# Patient Record
Sex: MALE | Race: WHITE | Employment: OTHER | URBAN - NONMETROPOLITAN AREA
[De-identification: names, ages, dates, MRNs, and addresses within clinical notes are randomized per-mention and may not be internally consistent; named-entity substitution may affect disease eponyms.]

---

## 2017-04-27 ENCOUNTER — TELEPHONE (OUTPATIENT)
Dept: FAMILY MEDICINE CLINIC | Age: 57
End: 2017-04-27

## 2017-08-07 ENCOUNTER — TELEPHONE (OUTPATIENT)
Dept: FAMILY MEDICINE CLINIC | Age: 57
End: 2017-08-07

## 2017-10-09 ENCOUNTER — OFFICE VISIT (OUTPATIENT)
Dept: FAMILY MEDICINE CLINIC | Age: 57
End: 2017-10-09

## 2017-10-09 VITALS
SYSTOLIC BLOOD PRESSURE: 124 MMHG | OXYGEN SATURATION: 97 % | DIASTOLIC BLOOD PRESSURE: 74 MMHG | HEART RATE: 77 BPM | HEIGHT: 68 IN | WEIGHT: 214.2 LBS | BODY MASS INDEX: 32.46 KG/M2

## 2017-10-09 DIAGNOSIS — Z12.5 PROSTATE CANCER SCREENING: ICD-10-CM

## 2017-10-09 DIAGNOSIS — Z95.3 H/O HEART VALVE REPLACEMENT WITH BIOPROSTHETIC VALVE: ICD-10-CM

## 2017-10-09 DIAGNOSIS — I25.83 CORONARY ARTERY DISEASE DUE TO LIPID RICH PLAQUE: Primary | ICD-10-CM

## 2017-10-09 DIAGNOSIS — I48.0 PAROXYSMAL ATRIAL FIBRILLATION (HCC): ICD-10-CM

## 2017-10-09 DIAGNOSIS — E78.00 PURE HYPERCHOLESTEROLEMIA: ICD-10-CM

## 2017-10-09 DIAGNOSIS — Z23 NEED FOR INFLUENZA VACCINATION: ICD-10-CM

## 2017-10-09 DIAGNOSIS — Z79.899 ENCOUNTER FOR LONG-TERM (CURRENT) USE OF MEDICATIONS: ICD-10-CM

## 2017-10-09 DIAGNOSIS — I10 BENIGN ESSENTIAL HTN: ICD-10-CM

## 2017-10-09 DIAGNOSIS — M54.17 LUMBOSACRAL RADICULOPATHY AT L5: ICD-10-CM

## 2017-10-09 DIAGNOSIS — I25.10 CORONARY ARTERY DISEASE DUE TO LIPID RICH PLAQUE: Primary | ICD-10-CM

## 2017-10-09 PROCEDURE — 90688 IIV4 VACCINE SPLT 0.5 ML IM: CPT | Performed by: FAMILY MEDICINE

## 2017-10-09 PROCEDURE — 90471 IMMUNIZATION ADMIN: CPT | Performed by: FAMILY MEDICINE

## 2017-10-09 PROCEDURE — 99214 OFFICE O/P EST MOD 30 MIN: CPT | Performed by: FAMILY MEDICINE

## 2017-10-09 NOTE — PROGRESS NOTES
kg).  OBJECTIVE:    /74 (Site: Left Arm, Position: Sitting, Cuff Size: Large Adult)   Pulse 77   Ht 5' 8\" (1.727 m)   Wt 214 lb 3.2 oz (97.2 kg)   SpO2 97%   BMI 32.57 kg/m²   BP Readings from Last 2 Encounters:   10/09/17 124/74   12/05/16 116/71     Lab Review   No visits with results within 6 Month(s) from this visit. Latest known visit with results is:   Nurse Only on 11/01/2016   Component Date Value    Sodium 11/01/2016 140     Potassium 11/01/2016 4.2     Chloride 11/01/2016 103     CO2 11/01/2016 24     Anion Gap 11/01/2016 13     Glucose 11/01/2016 97     BUN 11/01/2016 20     CREATININE 11/01/2016 0.7*    GFR Non- 11/01/2016 >60     GFR  11/01/2016 >60     Calcium 11/01/2016 9.4     Total Protein 11/01/2016 6.6     Alb 11/01/2016 4.4     Albumin/Globulin Ratio 11/01/2016 2.0     Total Bilirubin 11/01/2016 0.9     Alkaline Phosphatase 11/01/2016 62     ALT 11/01/2016 35     AST 11/01/2016 25     Globulin 11/01/2016 2.2     Cholesterol, Total 11/01/2016 143     Triglycerides 11/01/2016 152*    HDL 11/01/2016 46     LDL Calculated 11/01/2016 67     VLDL CHOLESTEROL CALCULA* 11/01/2016 30     WBC 11/01/2016 4.8     RBC 11/01/2016 5.21     Hemoglobin 11/01/2016 15.5     Hematocrit 11/01/2016 47.1     MCV 11/01/2016 90.4     MCH 11/01/2016 29.8     MCHC 11/01/2016 33.0     RDW 11/01/2016 13.6     Platelets 53/62/4556 152     MPV 11/01/2016 8.4     Neutrophils % 11/01/2016 65.0     Lymphocytes % 11/01/2016 23.0     Monocytes % 11/01/2016 7.0     Eosinophils % 11/01/2016 4.0     Basophils % 11/01/2016 1.0     Neutrophils # 11/01/2016 3.1     Lymphocytes # 11/01/2016 1.1     Monocytes # 11/01/2016 0.3     Eosinophils # 11/01/2016 0.2     Basophils # 11/01/2016 0.0     TSH 11/01/2016 3.62     T4 Free 11/01/2016 1.1     PSA 11/01/2016 1.28        Physical Exam   Constitutional: He appears well-developed and well-nourished.  No so he is aware. Today he is in sinus rhythm. Prosthetic bowel sounds good. Blood pressure and lipids acceptable. He's had no difficulty were getting radiculopathy, he does not have any complaints today of same. His knee crepitance is same. Now worse a carpal tunnel splint. His shoulder is about the same. Follow-up in a year, return for labs. Health maintenance accepted. Patient should call the office immediately with new or ongoing signs or symptoms or worsening, or proceed to the emergency room. All entries in chief complaint and history of present illness are reviewed and validated by me. No changes in past medical history, past surgical history, social history, or family history were noted during the patient encounter unless specifically listed above. All updates of past medical history, past surgical history, social history, or family history were reviewed personally by me during the office visit. All problems listed in the assessment are stable unless noted otherwise. Medication profile reviewed personally by me during the office visit. Medication side effects and possible impairments from medications were discussed as applicable. Every effort has been made to assure accurate transcription by this voice recognition software. However, mistakes in transcription may still occur      I, Susan Lennox am scribing for and in the presence of Dr Jaleel Noguera. 10/9/2017      3:07 PM      I, Dr. Manjinder Chino, personally performed the services described in this documentation, as scribed by Susan Lennox, RN, in my presence, and it is both accurate and complete. I agree with the Chief Complaint, ROS, and Past Histories independently gathered by the clinical support staff and the remaining scribed note accurately describes my personal service to the patient.     10/9/2017    3:06 PM

## 2017-11-20 RX ORDER — ROSUVASTATIN CALCIUM 40 MG/1
TABLET, COATED ORAL
Qty: 30 TABLET | Refills: 11 | Status: SHIPPED | OUTPATIENT
Start: 2017-11-20 | End: 2018-11-23 | Stop reason: SDUPTHER

## 2017-12-22 ENCOUNTER — NURSE ONLY (OUTPATIENT)
Dept: FAMILY MEDICINE CLINIC | Age: 57
End: 2017-12-22

## 2017-12-22 DIAGNOSIS — E78.00 PURE HYPERCHOLESTEROLEMIA: ICD-10-CM

## 2017-12-22 DIAGNOSIS — Z12.5 PROSTATE CANCER SCREENING: ICD-10-CM

## 2017-12-22 DIAGNOSIS — I10 BENIGN ESSENTIAL HTN: ICD-10-CM

## 2017-12-22 DIAGNOSIS — Z79.899 ENCOUNTER FOR LONG-TERM (CURRENT) USE OF MEDICATIONS: ICD-10-CM

## 2017-12-22 LAB
A/G RATIO: 2.4 (ref 1.1–2.2)
ALBUMIN SERPL-MCNC: 4.7 G/DL (ref 3.4–5)
ALP BLD-CCNC: 62 U/L (ref 40–129)
ALT SERPL-CCNC: 28 U/L (ref 10–40)
ANION GAP SERPL CALCULATED.3IONS-SCNC: 13 MMOL/L (ref 3–16)
AST SERPL-CCNC: 22 U/L (ref 15–37)
BASOPHILS ABSOLUTE: 0 K/UL (ref 0–0.2)
BASOPHILS RELATIVE PERCENT: 0.6 %
BILIRUB SERPL-MCNC: 1 MG/DL (ref 0–1)
BUN BLDV-MCNC: 20 MG/DL (ref 7–20)
CALCIUM SERPL-MCNC: 9.6 MG/DL (ref 8.3–10.6)
CHLORIDE BLD-SCNC: 103 MMOL/L (ref 99–110)
CHOLESTEROL, TOTAL: 132 MG/DL (ref 0–199)
CO2: 26 MMOL/L (ref 21–32)
CREAT SERPL-MCNC: 0.7 MG/DL (ref 0.9–1.3)
EOSINOPHILS ABSOLUTE: 0.3 K/UL (ref 0–0.6)
EOSINOPHILS RELATIVE PERCENT: 5.8 %
GFR AFRICAN AMERICAN: >60
GFR NON-AFRICAN AMERICAN: >60
GLOBULIN: 2 G/DL
GLUCOSE BLD-MCNC: 90 MG/DL (ref 70–99)
HCT VFR BLD CALC: 45.9 % (ref 40.5–52.5)
HDLC SERPL-MCNC: 46 MG/DL (ref 40–60)
HEMOGLOBIN: 15.5 G/DL (ref 13.5–17.5)
LDL CHOLESTEROL CALCULATED: 68 MG/DL
LYMPHOCYTES ABSOLUTE: 1.3 K/UL (ref 1–5.1)
LYMPHOCYTES RELATIVE PERCENT: 27.4 %
MCH RBC QN AUTO: 30.5 PG (ref 26–34)
MCHC RBC AUTO-ENTMCNC: 33.8 G/DL (ref 31–36)
MCV RBC AUTO: 90.2 FL (ref 80–100)
MONOCYTES ABSOLUTE: 0.4 K/UL (ref 0–1.3)
MONOCYTES RELATIVE PERCENT: 8.9 %
NEUTROPHILS ABSOLUTE: 2.7 K/UL (ref 1.7–7.7)
NEUTROPHILS RELATIVE PERCENT: 57.3 %
PDW BLD-RTO: 13.2 % (ref 12.4–15.4)
PLATELET # BLD: 151 K/UL (ref 135–450)
PMV BLD AUTO: 9.1 FL (ref 5–10.5)
POTASSIUM SERPL-SCNC: 4.3 MMOL/L (ref 3.5–5.1)
PROSTATE SPECIFIC ANTIGEN: 1.42 NG/ML (ref 0–4)
RBC # BLD: 5.09 M/UL (ref 4.2–5.9)
SODIUM BLD-SCNC: 142 MMOL/L (ref 136–145)
TOTAL PROTEIN: 6.7 G/DL (ref 6.4–8.2)
TRIGL SERPL-MCNC: 91 MG/DL (ref 0–150)
VLDLC SERPL CALC-MCNC: 18 MG/DL
WBC # BLD: 4.7 K/UL (ref 4–11)

## 2017-12-22 PROCEDURE — 36415 COLL VENOUS BLD VENIPUNCTURE: CPT | Performed by: FAMILY MEDICINE

## 2018-07-20 ENCOUNTER — CLINICAL DOCUMENTATION (OUTPATIENT)
Dept: FAMILY MEDICINE CLINIC | Age: 58
End: 2018-07-20

## 2018-10-08 ENCOUNTER — TELEPHONE (OUTPATIENT)
Dept: FAMILY MEDICINE CLINIC | Age: 58
End: 2018-10-08

## 2018-10-10 ENCOUNTER — TELEPHONE (OUTPATIENT)
Dept: FAMILY MEDICINE CLINIC | Age: 58
End: 2018-10-10

## 2018-10-17 ENCOUNTER — OFFICE VISIT (OUTPATIENT)
Dept: FAMILY MEDICINE CLINIC | Age: 58
End: 2018-10-17
Payer: COMMERCIAL

## 2018-10-17 VITALS
TEMPERATURE: 98.5 F | DIASTOLIC BLOOD PRESSURE: 68 MMHG | OXYGEN SATURATION: 96 % | HEART RATE: 76 BPM | SYSTOLIC BLOOD PRESSURE: 124 MMHG | BODY MASS INDEX: 28.52 KG/M2 | HEIGHT: 68 IN | WEIGHT: 188.2 LBS

## 2018-10-17 DIAGNOSIS — M72.2 PLANTAR FASCIITIS OF LEFT FOOT: ICD-10-CM

## 2018-10-17 DIAGNOSIS — R23.9 ALTERATION IN SKIN INTEGRITY: ICD-10-CM

## 2018-10-17 DIAGNOSIS — R63.4 WEIGHT LOSS: ICD-10-CM

## 2018-10-17 DIAGNOSIS — I88.9 LYMPHADENITIS: Primary | ICD-10-CM

## 2018-10-17 DIAGNOSIS — I89.1 LYMPHANGITIS: ICD-10-CM

## 2018-10-17 PROCEDURE — 1111F DSCHRG MED/CURRENT MED MERGE: CPT | Performed by: FAMILY MEDICINE

## 2018-10-17 PROCEDURE — 99214 OFFICE O/P EST MOD 30 MIN: CPT | Performed by: FAMILY MEDICINE

## 2018-10-17 RX ORDER — LOSARTAN POTASSIUM 25 MG/1
25 TABLET ORAL DAILY
Qty: 30 TABLET | Refills: 11
Start: 2018-10-17 | End: 2021-03-23

## 2018-10-17 RX ORDER — METOPROLOL SUCCINATE 25 MG/1
12.5 TABLET, EXTENDED RELEASE ORAL 2 TIMES DAILY
Qty: 30 TABLET | Refills: 11
Start: 2018-10-17 | End: 2021-04-23

## 2018-11-05 ENCOUNTER — TELEPHONE (OUTPATIENT)
Dept: FAMILY MEDICINE CLINIC | Age: 58
End: 2018-11-05

## 2018-11-05 DIAGNOSIS — E78.00 HYPERCHOLESTEROLEMIA: Primary | ICD-10-CM

## 2018-11-05 NOTE — TELEPHONE ENCOUNTER
Dr. Janice Lainez:    Notes: Patient will be in Nov 9 for labs    This patient has an upcoming appointment with you for a Routine Physical.     In planning for that visit I have completed the following pre-visit planning:     Pre-Visit Planning Checklist:  Patient contacted: yes  Verified patient by name and date of birth: yes    Health Maintenance items reviewed:    No pre-visit planning health maintenance topics to review at this time    Labs and procedures pended: Fasting Lipid Panel  and CMP   Labs and procedures discussed with patient: yes  Reminded patient to check with their insurance company about coverage for lab tests and lab location: no    Preliminary Medication Reconciliation: was performed. Reminded patient to arrive early: yes    Please complete the med-reconciliation and sign the appropriate labs as soon as possible.       Lora Watts MA  Pre-Services Specialist

## 2018-11-09 ENCOUNTER — NURSE ONLY (OUTPATIENT)
Dept: FAMILY MEDICINE CLINIC | Age: 58
End: 2018-11-09
Payer: COMMERCIAL

## 2018-11-09 DIAGNOSIS — E78.00 HYPERCHOLESTEROLEMIA: ICD-10-CM

## 2018-11-09 DIAGNOSIS — E78.00 PURE HYPERCHOLESTEROLEMIA: Primary | ICD-10-CM

## 2018-11-09 PROCEDURE — 36415 COLL VENOUS BLD VENIPUNCTURE: CPT | Performed by: FAMILY MEDICINE

## 2018-11-10 LAB
A/G RATIO: 1.9 (ref 1.1–2.2)
ALBUMIN SERPL-MCNC: 4.5 G/DL (ref 3.4–5)
ALP BLD-CCNC: 64 U/L (ref 40–129)
ALT SERPL-CCNC: 21 U/L (ref 10–40)
ANION GAP SERPL CALCULATED.3IONS-SCNC: 13 MMOL/L (ref 3–16)
AST SERPL-CCNC: 19 U/L (ref 15–37)
BILIRUB SERPL-MCNC: 0.9 MG/DL (ref 0–1)
BUN BLDV-MCNC: 18 MG/DL (ref 7–20)
CALCIUM SERPL-MCNC: 9.7 MG/DL (ref 8.3–10.6)
CHLORIDE BLD-SCNC: 104 MMOL/L (ref 99–110)
CHOLESTEROL, TOTAL: 138 MG/DL (ref 0–199)
CO2: 25 MMOL/L (ref 21–32)
CREAT SERPL-MCNC: 0.8 MG/DL (ref 0.9–1.3)
GFR AFRICAN AMERICAN: >60
GFR NON-AFRICAN AMERICAN: >60
GLOBULIN: 2.4 G/DL
GLUCOSE BLD-MCNC: 81 MG/DL (ref 70–99)
HDLC SERPL-MCNC: 51 MG/DL (ref 40–60)
LDL CHOLESTEROL CALCULATED: 72 MG/DL
POTASSIUM SERPL-SCNC: 4.3 MMOL/L (ref 3.5–5.1)
SODIUM BLD-SCNC: 142 MMOL/L (ref 136–145)
TOTAL PROTEIN: 6.9 G/DL (ref 6.4–8.2)
TRIGL SERPL-MCNC: 77 MG/DL (ref 0–150)
TSH SERPL DL<=0.05 MIU/L-ACNC: 1.43 UIU/ML (ref 0.27–4.2)
VLDLC SERPL CALC-MCNC: 15 MG/DL

## 2018-11-26 RX ORDER — ROSUVASTATIN CALCIUM 40 MG/1
TABLET, COATED ORAL
Qty: 30 TABLET | Refills: 1 | Status: SHIPPED | OUTPATIENT
Start: 2018-11-26

## 2019-01-11 ENCOUNTER — OFFICE VISIT (OUTPATIENT)
Dept: FAMILY MEDICINE CLINIC | Age: 59
End: 2019-01-11
Payer: COMMERCIAL

## 2019-01-11 VITALS
SYSTOLIC BLOOD PRESSURE: 118 MMHG | OXYGEN SATURATION: 98 % | BODY MASS INDEX: 29.56 KG/M2 | HEART RATE: 64 BPM | DIASTOLIC BLOOD PRESSURE: 76 MMHG | WEIGHT: 194.4 LBS

## 2019-01-11 DIAGNOSIS — I10 BENIGN ESSENTIAL HTN: ICD-10-CM

## 2019-01-11 DIAGNOSIS — I25.83 CORONARY ARTERY DISEASE DUE TO LIPID RICH PLAQUE: ICD-10-CM

## 2019-01-11 DIAGNOSIS — R53.1 SPELL OF GENERALIZED WEAKNESS: Primary | ICD-10-CM

## 2019-01-11 DIAGNOSIS — I25.10 CORONARY ARTERY DISEASE DUE TO LIPID RICH PLAQUE: ICD-10-CM

## 2019-01-11 DIAGNOSIS — Z23 NEED FOR SHINGLES VACCINE: ICD-10-CM

## 2019-01-11 DIAGNOSIS — E78.00 PURE HYPERCHOLESTEROLEMIA: ICD-10-CM

## 2019-01-11 DIAGNOSIS — Z12.5 PROSTATE CANCER SCREENING: ICD-10-CM

## 2019-01-11 DIAGNOSIS — Z95.3 H/O HEART VALVE REPLACEMENT WITH BIOPROSTHETIC VALVE: ICD-10-CM

## 2019-01-11 DIAGNOSIS — I48.0 PAROXYSMAL ATRIAL FIBRILLATION (HCC): ICD-10-CM

## 2019-01-11 PROCEDURE — 99214 OFFICE O/P EST MOD 30 MIN: CPT | Performed by: FAMILY MEDICINE

## 2019-04-30 ENCOUNTER — OFFICE VISIT (OUTPATIENT)
Dept: FAMILY MEDICINE CLINIC | Age: 59
End: 2019-04-30
Payer: COMMERCIAL

## 2019-04-30 VITALS
DIASTOLIC BLOOD PRESSURE: 80 MMHG | HEART RATE: 68 BPM | BODY MASS INDEX: 29.86 KG/M2 | OXYGEN SATURATION: 98 % | SYSTOLIC BLOOD PRESSURE: 112 MMHG | WEIGHT: 196.4 LBS

## 2019-04-30 DIAGNOSIS — S70.12XA HEMATOMA OF LEFT THIGH, INITIAL ENCOUNTER: Primary | ICD-10-CM

## 2019-04-30 DIAGNOSIS — Z79.01 ANTICOAGULATED: ICD-10-CM

## 2019-04-30 PROCEDURE — 99213 OFFICE O/P EST LOW 20 MIN: CPT | Performed by: FAMILY MEDICINE

## 2019-04-30 ASSESSMENT — PATIENT HEALTH QUESTIONNAIRE - PHQ9
SUM OF ALL RESPONSES TO PHQ9 QUESTIONS 1 & 2: 0
1. LITTLE INTEREST OR PLEASURE IN DOING THINGS: 0
SUM OF ALL RESPONSES TO PHQ QUESTIONS 1-9: 0
2. FEELING DOWN, DEPRESSED OR HOPELESS: 0
SUM OF ALL RESPONSES TO PHQ QUESTIONS 1-9: 0

## 2019-04-30 NOTE — PROGRESS NOTES
Alcohol use: No     Alcohol/week: 0.0 oz    Drug use: No    Sexual activity: Yes     Partners: Female   Lifestyle    Physical activity:     Days per week: Not on file     Minutes per session: Not on file    Stress: Not on file   Relationships    Social connections:     Talks on phone: Not on file     Gets together: Not on file     Attends [employer] service: Not on file     Active member of club or organization: Not on file     Attends meetings of clubs or organizations: Not on file     Relationship status: Not on file    Intimate partner violence:     Fear of current or ex partner: Not on file     Emotionally abused: Not on file     Physically abused: Not on file     Forced sexual activity: Not on file   Other Topics Concern    Not on file   Social History Narrative    Not on file       Prior to Visit Medications    Medication Sig Taking? Authorizing Provider   apixaban (ELIQUIS) 5 MG TABS tablet Take 5 mg by mouth 2 times daily Yes Historical Provider, MD   zoster recombinant adjuvanted vaccine (SHINGRIX) 50 MCG/0.5ML SUSR injection 1 now and repeat in 2-6 months. Yes Cody Miller MD   rosuvastatin (CRESTOR) 40 MG tablet TAKE 1 TABLET BY MOUTH DAILY Yes Cody Miller MD   losartan (COZAAR) 25 MG tablet Take 1 tablet by mouth daily Yes Cody Miller MD   metoprolol succinate (TOPROL XL) 25 MG extended release tablet Take 0.5 tablets by mouth 2 times daily Yes Cody Miller MD   clopidogrel (PLAVIX) 75 MG tablet Take 75 mg by mouth daily. Yes Historical Provider, MD   ibuprofen (ADVIL;MOTRIN) 200 MG tablet Take 200 mg by mouth 3 times daily. Yes Historical Provider, MD   pantoprazole (PROTONIX) 40 MG tablet Take 40 mg by mouth daily. Yes Historical Provider, MD   fish oil-omega-3 fatty acids 1000 MG capsule Take 2 g by mouth three times daily.    Yes Historical Provider, MD     Allergies   Allergen Reactions    Miralax [Polyethylene Glycol] Other (See Comments)     Seizure after consuming whole bottle for colonoscopy per patient    Codeine Nausea And Vomiting       OBJECTIVE:  Estimated body mass index is 29.86 kg/m² as calculated from the following:    Height as of 10/17/18: 5' 8\" (1.727 m). Weight as of this encounter: 196 lb 6.4 oz (89.1 kg). Vitals:    04/30/19 1307   BP: 112/80   Site: Left Upper Arm   Position: Sitting   Cuff Size: Medium Adult   Pulse: 68   SpO2: 98%   Weight: 196 lb 6.4 oz (89.1 kg)     BP Readings from Last 2 Encounters:   04/30/19 112/80   01/11/19 118/76     Wt Readings from Last 3 Encounters:   04/30/19 196 lb 6.4 oz (89.1 kg)   01/11/19 194 lb 6.4 oz (88.2 kg)   10/17/18 188 lb 3.2 oz (85.4 kg)       Physical Exam   Constitutional: He is oriented to person, place, and time. He appears well-developed and well-nourished. HENT:   Head: Normocephalic and atraumatic. Cardiovascular: Normal rate. Pulmonary/Chest: Effort normal.   Musculoskeletal:        Legs:  Neurological: He is alert and oriented to person, place, and time. Psychiatric: He has a normal mood and affect. His behavior is normal. Judgment and thought content normal.   Nursing note and vitals reviewed. ASSESSMENT PLAN      Diagnosis Orders   1. Hematoma of left thigh, initial encounter     2. Anticoagulated     Likely posttraumatic hematoma, he was not on Eliquis until later, but that may have caused the rebleed around this site. Would like to rule out abscess or a fluid collection and requires draining. Also evaluate venous flow through the thigh although I do not suspect it would be impaired. I told him that this can take months to resolve on its own. He'll be getting a loop recorder May 10 and we will need to get the MRI done before that.   He also has been having MRIs of the thoracic aorta to evaluate for aneurysm surveillance and I told him he will have to check with the cardiologist whether the loop recorder would be MRI compatible otherwise he may have to return to CT angiograms of the aorta. Patient should call the office immediately with new or ongoing signs or symptoms or worsening, or proceed to the emergency room. No changes in past medical history, past surgical history, social history, or family history were noted during the patient encounter unless specifically listed above. All updates of past medical history, past surgical history, social history, or family history were reviewed personally by me during the office visit. All problems listed in the assessment are stable unless noted otherwise. Medication profile reviewed personally by me during the office visit. Medication side effects and possible impairments from medications were discussed as applicable. This document was prepared by a combination of typing and transcription through a voice recognition software. Scribe attestation: Carole Donovan RN, am scribing for and in the presence of José Miguel Jacobsen MD. Electronically signed by Nikolas Waldron RN on 4/30/2019 at 1:10 PM      Provider attestation:     I, Dr. Carly Mckenna, personally performed the services described in this documentation, as scribed by the above signed scribe in my presence, and it is both accurate and complete. I agree with the ROS and Past Histories independently gathered by the clinical support staff and the remaining scribed note accurately describes my personal service to the patient.       4/30/2019    3:41 PM

## 2019-05-06 ENCOUNTER — TELEPHONE (OUTPATIENT)
Dept: FAMILY MEDICINE CLINIC | Age: 59
End: 2019-05-06

## 2019-05-06 NOTE — TELEPHONE ENCOUNTER
Pt's wife called and said that swelling has went down and was wondering if wanted him to go ahead and get the MRI done and if so would like to have the order sent to [facility] in [facility] said that it was going to cost $1,800.00 at [facility] and $400.00 at [facility]

## 2019-05-07 NOTE — TELEPHONE ENCOUNTER
Patients wife states she would like order faxed to [facility] and don't even run thru the insurance, as they will pay cash and it is cheaper that way.

## 2019-05-10 ENCOUNTER — TELEPHONE (OUTPATIENT)
Dept: FAMILY MEDICINE CLINIC | Age: 59
End: 2019-05-10

## 2019-05-17 ENCOUNTER — TELEPHONE (OUTPATIENT)
Dept: FAMILY MEDICINE CLINIC | Age: 59
End: 2019-05-17

## 2019-05-17 NOTE — TELEPHONE ENCOUNTER
Received fax from [address] [place] [address] and placed on PCP desk. Called Rebeka(NAS) and informed that we received it and that PCP is out of the office today and it may be Monday when they get a call with the results.

## 2019-05-17 NOTE — TELEPHONE ENCOUNTER
Rebeka(EC) called regarding the results from [facility]. Not seeing that we've received this yet. Elroy Muro will be calling [facility] to make sure they sent results. Gave our fax number to double check if they have the correct number.

## 2019-05-21 ENCOUNTER — TELEPHONE (OUTPATIENT)
Dept: FAMILY MEDICINE CLINIC | Age: 59
End: 2019-05-21

## 2019-05-21 NOTE — TELEPHONE ENCOUNTER
Rebeka(EC) called following up on the results from the MRI pt got last week from [facility]. We did receive the results, in basket waiting for PCP to review.  Call back Matty Rae [phone]

## 2019-05-22 NOTE — TELEPHONE ENCOUNTER
Pt wanted to tell you this happened April 7 and has almost been 8 weeks of no change. Attached is report please advise if this changes recommendation.

## 2020-03-12 ENCOUNTER — NURSE ONLY (OUTPATIENT)
Dept: FAMILY MEDICINE CLINIC | Age: 60
End: 2020-03-12
Payer: COMMERCIAL

## 2020-03-12 LAB
A/G RATIO: 2 (ref 1.1–2.2)
ALBUMIN SERPL-MCNC: 4.5 G/DL (ref 3.4–5)
ALP BLD-CCNC: 53 U/L (ref 40–129)
ALT SERPL-CCNC: 26 U/L (ref 10–40)
ANION GAP SERPL CALCULATED.3IONS-SCNC: 10 MMOL/L (ref 3–16)
AST SERPL-CCNC: 24 U/L (ref 15–37)
BASOPHILS ABSOLUTE: 0 K/UL (ref 0–0.2)
BASOPHILS RELATIVE PERCENT: 0.6 %
BILIRUB SERPL-MCNC: 1 MG/DL (ref 0–1)
BUN BLDV-MCNC: 19 MG/DL (ref 7–20)
CALCIUM SERPL-MCNC: 9.6 MG/DL (ref 8.3–10.6)
CHLORIDE BLD-SCNC: 105 MMOL/L (ref 99–110)
CHOLESTEROL, TOTAL: 130 MG/DL (ref 0–199)
CO2: 26 MMOL/L (ref 21–32)
CREAT SERPL-MCNC: 0.8 MG/DL (ref 0.8–1.3)
EOSINOPHILS ABSOLUTE: 0.2 K/UL (ref 0–0.6)
EOSINOPHILS RELATIVE PERCENT: 4.2 %
GFR AFRICAN AMERICAN: >60
GFR NON-AFRICAN AMERICAN: >60
GLOBULIN: 2.2 G/DL
GLUCOSE BLD-MCNC: 84 MG/DL (ref 70–99)
HCT VFR BLD CALC: 46 % (ref 40.5–52.5)
HDLC SERPL-MCNC: 48 MG/DL (ref 40–60)
HEMOGLOBIN: 15.8 G/DL (ref 13.5–17.5)
LDL CHOLESTEROL CALCULATED: 67 MG/DL
LYMPHOCYTES ABSOLUTE: 1.3 K/UL (ref 1–5.1)
LYMPHOCYTES RELATIVE PERCENT: 29 %
MCH RBC QN AUTO: 31 PG (ref 26–34)
MCHC RBC AUTO-ENTMCNC: 34.2 G/DL (ref 31–36)
MCV RBC AUTO: 90.6 FL (ref 80–100)
MONOCYTES ABSOLUTE: 0.3 K/UL (ref 0–1.3)
MONOCYTES RELATIVE PERCENT: 7 %
NEUTROPHILS ABSOLUTE: 2.7 K/UL (ref 1.7–7.7)
NEUTROPHILS RELATIVE PERCENT: 59.2 %
PDW BLD-RTO: 13.2 % (ref 12.4–15.4)
PLATELET # BLD: 143 K/UL (ref 135–450)
PMV BLD AUTO: 8.5 FL (ref 5–10.5)
POTASSIUM SERPL-SCNC: 4.7 MMOL/L (ref 3.5–5.1)
PROSTATE SPECIFIC ANTIGEN: 1.69 NG/ML (ref 0–4)
RBC # BLD: 5.08 M/UL (ref 4.2–5.9)
SODIUM BLD-SCNC: 141 MMOL/L (ref 136–145)
TOTAL PROTEIN: 6.7 G/DL (ref 6.4–8.2)
TRIGL SERPL-MCNC: 73 MG/DL (ref 0–150)
VLDLC SERPL CALC-MCNC: 15 MG/DL
WBC # BLD: 4.5 K/UL (ref 4–11)

## 2020-03-12 PROCEDURE — 36415 COLL VENOUS BLD VENIPUNCTURE: CPT | Performed by: FAMILY MEDICINE

## 2020-03-24 ENCOUNTER — TELEPHONE (OUTPATIENT)
Dept: FAMILY MEDICINE CLINIC | Age: 60
End: 2020-03-24

## 2020-07-24 ENCOUNTER — TELEPHONE (OUTPATIENT)
Dept: FAMILY MEDICINE CLINIC | Age: 60
End: 2020-07-24

## 2020-07-24 NOTE — TELEPHONE ENCOUNTER
----- Message from Evert Mike sent at 7/24/2020 12:23 PM EDT -----  Subject: Message to Provider    QUESTIONS  Information for Provider? Patients wife called in   wants to change visit type to VV if possible. Please advise.  ---------------------------------------------------------------------------  --------------  CALL BACK INFO  What is the best way for the office to contact you? OK to leave message on   voicemail  Preferred Call Back Phone Number? [phone]  ---------------------------------------------------------------------------  --------------  SCRIPT ANSWERS  Relationship to Patient? Other  Representative Name? Ashley Brown  Is the Representative on the appropriate HIPAA document in Epic?  Yes

## 2020-07-27 ENCOUNTER — VIRTUAL VISIT (OUTPATIENT)
Dept: FAMILY MEDICINE CLINIC | Age: 60
End: 2020-07-27
Payer: COMMERCIAL

## 2020-07-27 PROCEDURE — 99214 OFFICE O/P EST MOD 30 MIN: CPT | Performed by: FAMILY MEDICINE

## 2020-07-27 ASSESSMENT — PATIENT HEALTH QUESTIONNAIRE - PHQ9
1. LITTLE INTEREST OR PLEASURE IN DOING THINGS: 0
2. FEELING DOWN, DEPRESSED OR HOPELESS: 0
SUM OF ALL RESPONSES TO PHQ QUESTIONS 1-9: 0
SUM OF ALL RESPONSES TO PHQ9 QUESTIONS 1 & 2: 0
SUM OF ALL RESPONSES TO PHQ QUESTIONS 1-9: 0

## 2020-07-27 NOTE — PROGRESS NOTES
2020    TELEHEALTH EVALUATION -- Audio/Visual (During OAJWA-82 public health emergency)    HPI:    Samuel Be (:  1960) has requested an audio/video evaluation for the following concern(s):    Patient is to call immediately with worsening, or if after hours, go to the emergency room. Call if not feeling somewhat better after 48 hours. On his health conditions and from the injury to his leg from the tree hitting it. He has a small lump still where the tree hit but no other issues with it. His back is doing okay. No symptoms of coronary insufficiency. He has not noticed his heart jumping or fluttering. Still has the loop recorder in place. He sees an electrophysiologist at [facility] once a year next appointment in 2020. Blood pressure at home running acceptable. No chest pain or difficulty breathing. Not following a particular diet but he is taking his medication. He does not mention any abnormal bleeding. 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 Review of Systems   All other systems reviewed and are negative. Prior to Visit Medications    Medication Sig Taking? Authorizing Provider   apixaban (ELIQUIS) 5 MG TABS tablet Take 5 mg by mouth 2 times daily Yes Historical Provider, MD   zoster recombinant adjuvanted vaccine (SHINGRIX) 50 MCG/0.5ML SUSR injection 1 now and repeat in 2-6 months. Yes Deep Rogers MD   rosuvastatin (CRESTOR) 40 MG tablet TAKE 1 TABLET BY MOUTH DAILY Yes Deep Rogers MD   losartan (COZAAR) 25 MG tablet Take 1 tablet by mouth daily Yes Deep Rogers MD   metoprolol succinate (TOPROL XL) 25 MG extended release tablet Take 0.5 tablets by mouth 2 times daily Yes Deep Rogers MD   clopidogrel (PLAVIX) 75 MG tablet Take 75 mg by mouth daily.    Yes Historical Provider, MD   ibuprofen (ADVIL;MOTRIN) 200 MG tablet Take 200 Abnormal-       Neurological:        [x] No Facial Asymmetry (Cranial nerve 7 motor function) (limited exam to video visit)          [x] No gaze palsy        [] Abnormal-         Skin:        [x] No significant exanthematous lesions or discoloration noted on facial skin         [] Abnormal-            Psychiatric:       [x] Normal Affect [x] No Hallucinations        [] Abnormal-     Other pertinent observable physical exam findings-      ASSESSMENT PLAN      Diagnosis Orders   1. Degenerative disc disease, lumbar     2. Coronary artery disease due to lipid rich plaque  Comprehensive Metabolic Panel   3. Benign essential HTN  Comprehensive Metabolic Panel   4. Pure hypercholesterolemia  Comprehensive Metabolic Panel    Lipid Panel   5. Paroxysmal atrial fibrillation (HCC)     6. H/O heart valve replacement with bioprosthetic valve     7. Prostate cancer screening  Psa screening   8. Anticoagulated  CBC Auto Differential   Back is about the same. No symptoms of coronary insufficiency. Blood pressure at home acceptable. Continue lipid monitoring as needed. Patient is not having any heart issues nor palpitations. 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 We will check his CBC yearly because he is anticoagulated. Follow-up in office in March 2021    Patient should call the office immediately with new or ongoing signs or symptoms or worsening, or proceed to the emergency room. No changes in past medical history, past surgical history, social history, or family history were noted during the patient encounter unless specifically listed above. All updates of past medical history, past surgical history, social history, or family history were reviewed personally by me during the office visit. All problems listed in the assessment are stable unless noted otherwise. Medication profile reviewed personally by me during the visit.   Medication side effects and possible impairments from medications were discussed as applicable. This document was prepared by a combination of typing and transcription through a voice recognition software. Citlalli Moss is a 63 y.o. male being evaluated by a Virtual Visit (video visit) encounter to address concerns as mentioned above. A caregiver was present when appropriate. Due to this being a TeleHealth encounter ([facility]), evaluation of the following organ systems was limited: Vitals/Constitutional/EENT/Resp/CV/GI//MS/Neuro/Skin/Heme-Lymph-Imm. Pursuant to the emergency declaration under the [address] [place] [address] and the [employer] and [employer] Act, this Virtual Visit was conducted with patient's (and/or legal guardian's) consent, to reduce the patient's risk of exposure to COVID-19 and provide necessary medical care. The patient (and/or legal guardian) has also been advised to contact this office for worsening conditions or problems, and seek emergency medical treatment and/or call 911 if deemed necessary. Services were provided through a video synchronous discussion virtually to substitute for in-person clinic visit. Patient and provider were located at their individual homes. --Linda Bro MD on 7/27/2020 at 11:39 AM    An electronic signature was used to authenticate this note.

## 2020-08-31 ENCOUNTER — PATIENT MESSAGE (OUTPATIENT)
Dept: FAMILY MEDICINE CLINIC | Age: 60
End: 2020-08-31

## 2020-08-31 NOTE — TELEPHONE ENCOUNTER
I would have thought the dye would upset the ulcer. I would have him double up Protonix for 2 weeks.   Call with prolonged symptoms or worsening

## 2020-08-31 NOTE — TELEPHONE ENCOUNTER
[facility]  To: Arun Osman MD  Sent: 8/31/2020 3:51 PM EDT  Subject: Non-Urgent Medical Question    Waqas Rolle had a MRI with contrast on Aug 20 This time they used a new dye Dotorem. He has never had that. Now he has a upset stomach alot and has dark stool. I think he has an ulcer upset. Jo-Ann Viramontes told him years ago he had an ulcer. Is there anything we should be doing or just wait it out?? He does take Protonics. Would the dye upset an ulcer? ?? He had just about all the side effects that I saw. .    Thanks Elroy Muro

## 2020-10-29 RX ORDER — PANTOPRAZOLE SODIUM 40 MG/1
TABLET, DELAYED RELEASE ORAL
Qty: 90 TABLET | Refills: 3 | Status: SHIPPED | OUTPATIENT
Start: 2020-10-29 | End: 2021-12-10

## 2021-03-18 ENCOUNTER — NURSE ONLY (OUTPATIENT)
Dept: FAMILY MEDICINE CLINIC | Age: 61
End: 2021-03-18
Payer: COMMERCIAL

## 2021-03-18 DIAGNOSIS — I25.83 CORONARY ARTERY DISEASE DUE TO LIPID RICH PLAQUE: ICD-10-CM

## 2021-03-18 DIAGNOSIS — I10 BENIGN ESSENTIAL HTN: ICD-10-CM

## 2021-03-18 DIAGNOSIS — I25.10 CORONARY ARTERY DISEASE DUE TO LIPID RICH PLAQUE: ICD-10-CM

## 2021-03-18 DIAGNOSIS — Z79.01 ANTICOAGULATED: ICD-10-CM

## 2021-03-18 DIAGNOSIS — Z12.5 PROSTATE CANCER SCREENING: ICD-10-CM

## 2021-03-18 DIAGNOSIS — E78.00 PURE HYPERCHOLESTEROLEMIA: ICD-10-CM

## 2021-03-18 PROCEDURE — 36415 COLL VENOUS BLD VENIPUNCTURE: CPT | Performed by: FAMILY MEDICINE

## 2021-03-19 LAB
A/G RATIO: 2 (ref 1.1–2.2)
ALBUMIN SERPL-MCNC: 4.6 G/DL (ref 3.4–5)
ALP BLD-CCNC: 61 U/L (ref 40–129)
ALT SERPL-CCNC: 20 U/L (ref 10–40)
ANION GAP SERPL CALCULATED.3IONS-SCNC: 8 MMOL/L (ref 3–16)
AST SERPL-CCNC: 20 U/L (ref 15–37)
BASOPHILS ABSOLUTE: 0 K/UL (ref 0–0.2)
BASOPHILS RELATIVE PERCENT: 0.9 %
BILIRUB SERPL-MCNC: 0.9 MG/DL (ref 0–1)
BUN BLDV-MCNC: 16 MG/DL (ref 7–20)
CALCIUM SERPL-MCNC: 9.4 MG/DL (ref 8.3–10.6)
CHLORIDE BLD-SCNC: 105 MMOL/L (ref 99–110)
CHOLESTEROL, TOTAL: 139 MG/DL (ref 0–199)
CO2: 28 MMOL/L (ref 21–32)
CREAT SERPL-MCNC: 0.8 MG/DL (ref 0.8–1.3)
EOSINOPHILS ABSOLUTE: 0.2 K/UL (ref 0–0.6)
EOSINOPHILS RELATIVE PERCENT: 5.5 %
GFR AFRICAN AMERICAN: >60
GFR NON-AFRICAN AMERICAN: >60
GLOBULIN: 2.3 G/DL
GLUCOSE BLD-MCNC: 85 MG/DL (ref 70–99)
HCT VFR BLD CALC: 45.6 % (ref 40.5–52.5)
HDLC SERPL-MCNC: 39 MG/DL (ref 40–60)
HEMOGLOBIN: 15.7 G/DL (ref 13.5–17.5)
LDL CHOLESTEROL CALCULATED: 78 MG/DL
LYMPHOCYTES ABSOLUTE: 1.2 K/UL (ref 1–5.1)
LYMPHOCYTES RELATIVE PERCENT: 27.2 %
MCH RBC QN AUTO: 30.9 PG (ref 26–34)
MCHC RBC AUTO-ENTMCNC: 34.4 G/DL (ref 31–36)
MCV RBC AUTO: 90 FL (ref 80–100)
MONOCYTES ABSOLUTE: 0.3 K/UL (ref 0–1.3)
MONOCYTES RELATIVE PERCENT: 7.4 %
NEUTROPHILS ABSOLUTE: 2.5 K/UL (ref 1.7–7.7)
NEUTROPHILS RELATIVE PERCENT: 59 %
PDW BLD-RTO: 13.3 % (ref 12.4–15.4)
PLATELET # BLD: 172 K/UL (ref 135–450)
PMV BLD AUTO: 9 FL (ref 5–10.5)
POTASSIUM SERPL-SCNC: 4.7 MMOL/L (ref 3.5–5.1)
PROSTATE SPECIFIC ANTIGEN: 2.48 NG/ML (ref 0–4)
RBC # BLD: 5.07 M/UL (ref 4.2–5.9)
SODIUM BLD-SCNC: 141 MMOL/L (ref 136–145)
TOTAL PROTEIN: 6.9 G/DL (ref 6.4–8.2)
TRIGL SERPL-MCNC: 109 MG/DL (ref 0–150)
VLDLC SERPL CALC-MCNC: 22 MG/DL
WBC # BLD: 4.3 K/UL (ref 4–11)

## 2021-03-23 ENCOUNTER — OFFICE VISIT (OUTPATIENT)
Dept: FAMILY MEDICINE CLINIC | Age: 61
End: 2021-03-23
Payer: COMMERCIAL

## 2021-03-23 VITALS
HEART RATE: 66 BPM | BODY MASS INDEX: 30.16 KG/M2 | WEIGHT: 199 LBS | DIASTOLIC BLOOD PRESSURE: 80 MMHG | TEMPERATURE: 97.2 F | OXYGEN SATURATION: 96 % | SYSTOLIC BLOOD PRESSURE: 128 MMHG | HEIGHT: 68 IN

## 2021-03-23 DIAGNOSIS — I25.83 CORONARY ARTERY DISEASE DUE TO LIPID RICH PLAQUE: ICD-10-CM

## 2021-03-23 DIAGNOSIS — Z95.3 H/O HEART VALVE REPLACEMENT WITH BIOPROSTHETIC VALVE: ICD-10-CM

## 2021-03-23 DIAGNOSIS — I95.9 HYPOTENSION, UNSPECIFIED HYPOTENSION TYPE: ICD-10-CM

## 2021-03-23 DIAGNOSIS — M75.42 ROTATOR CUFF IMPINGEMENT SYNDROME OF LEFT SHOULDER: ICD-10-CM

## 2021-03-23 DIAGNOSIS — M54.17 LUMBOSACRAL RADICULOPATHY AT L5: ICD-10-CM

## 2021-03-23 DIAGNOSIS — I48.0 PAROXYSMAL ATRIAL FIBRILLATION (HCC): ICD-10-CM

## 2021-03-23 DIAGNOSIS — K26.9 DUODENAL ULCER: ICD-10-CM

## 2021-03-23 DIAGNOSIS — I10 BENIGN ESSENTIAL HTN: Primary | ICD-10-CM

## 2021-03-23 DIAGNOSIS — M51.36 DEGENERATIVE DISC DISEASE, LUMBAR: ICD-10-CM

## 2021-03-23 DIAGNOSIS — I25.10 CORONARY ARTERY DISEASE DUE TO LIPID RICH PLAQUE: ICD-10-CM

## 2021-03-23 DIAGNOSIS — E78.00 PURE HYPERCHOLESTEROLEMIA: ICD-10-CM

## 2021-03-23 PROCEDURE — 99214 OFFICE O/P EST MOD 30 MIN: CPT | Performed by: FAMILY MEDICINE

## 2021-03-23 RX ORDER — MELOXICAM 15 MG/1
15 TABLET ORAL DAILY
Qty: 30 TABLET | Refills: 11 | Status: SHIPPED | OUTPATIENT
Start: 2021-03-23 | End: 2021-03-23

## 2021-03-23 RX ORDER — MISOPROSTOL 100 UG/1
100 TABLET ORAL 3 TIMES DAILY
Qty: 120 TABLET | Refills: 11 | Status: SHIPPED | OUTPATIENT
Start: 2021-03-23 | End: 2021-04-23

## 2021-03-23 ASSESSMENT — PATIENT HEALTH QUESTIONNAIRE - PHQ9
2. FEELING DOWN, DEPRESSED OR HOPELESS: 0
SUM OF ALL RESPONSES TO PHQ QUESTIONS 1-9: 0
1. LITTLE INTEREST OR PLEASURE IN DOING THINGS: 0
SUM OF ALL RESPONSES TO PHQ9 QUESTIONS 1 & 2: 0
SUM OF ALL RESPONSES TO PHQ QUESTIONS 1-9: 0
SUM OF ALL RESPONSES TO PHQ QUESTIONS 1-9: 0

## 2021-03-23 NOTE — PROGRESS NOTES
Hyperlipidemia:  No new myalgias or GI upset on rosuvastatin (Crestor). Lab Results   Component Value Date    CHOL 139 03/18/2021    TRIG 109 03/18/2021    HDL 39 (L) 03/18/2021    LDLCALC 78 03/18/2021     Lab Results   Component Value Date    ALT 20 03/18/2021    AST 20 03/18/2021          [x] Patient has completed an advance directive  [] Patient has NOT completed an advanced directive  [x] Patient has a documented healthcare surrogate  [] Patient does NOT have a documented healthcare surrogate  [] Discussed the importance of establishing and updating an advanced directive. Patient has questions at this time and those were answered. [x] Discussed the importance of establishing and updating an advanced directive. Patient does NOT have questions at this time. Discussed with: [x] Patient            [] Family             [] Other caregiver    Patient's medications, allergies, past medical, surgical, social and family histories were reviewed and updated asappropriate on 3/23/2021 at 1:48 PM.    ROS:  Review of Systems    All other systems reviewed and are negative except as noted above on 3/23/2021 at 1:48 PM. Additional review of systems may be scanned into the media section ofthis medical record. Any responses requiring further intervention were pursued.     Past Medical History:   Diagnosis Date    CAD (coronary artery disease)     Hypertension     Other disorders of kidney and ureter     stones    Pericarditis      Family History   Problem Relation Age of Onset    Diabetes Mother     High Blood Pressure Mother      Social History     Socioeconomic History    Marital status:      Spouse name: Not on file    Number of children: Not on file    Years of education: Not on file    Highest education level: Not on file   Occupational History    Not on file   Social Needs    Financial resource strain: Not on file    Food insecurity     Worry: Not on file     Inability: Not on file   [place] Transportation needs     Medical: Not on file     Non-medical: Not on file   Tobacco Use    Smoking status: Never Smoker    Smokeless tobacco: Never Used   Substance and Sexual Activity    Alcohol use: No     Alcohol/week: 0.0 standard drinks    Drug use: No    Sexual activity: Yes     Partners: Female   Lifestyle    Physical activity     Days per week: Not on file     Minutes per session: Not on file    Stress: Not on file   Relationships    Social connections     Talks on phone: Not on file     Gets together: Not on file     Attends Sabianism service: Not on file     Active member of club or organization: Not on file     Attends meetings of clubs or organizations: Not on file     Relationship status: Not on file    Intimate partner violence     Fear of current or ex partner: Not on file     Emotionally abused: Not on file     Physically abused: Not on file     Forced sexual activity: Not on file   Other Topics Concern    Not on file   Social History Narrative    Not on file     Prior to Visit Medications    Medication Sig Taking? Authorizing Provider   pantoprazole (PROTONIX) 40 MG tablet TAKE 1 TABLET BY MOUTH DAILY Yes Parvez Whalen MD   apixaban (ELIQUIS) 5 MG TABS tablet Take 5 mg by mouth 2 times daily Yes Historical Provider, MD   rosuvastatin (CRESTOR) 40 MG tablet TAKE 1 TABLET BY MOUTH DAILY Yes Parvez Whalen MD   metoprolol succinate (TOPROL XL) 25 MG extended release tablet Take 0.5 tablets by mouth 2 times daily Yes Parvez Whalen MD   clopidogrel (PLAVIX) 75 MG tablet Take 75 mg by mouth daily. Yes Historical Provider, MD   ibuprofen (ADVIL;MOTRIN) 200 MG tablet Take 200 mg by mouth 3 times daily.    Yes Historical Provider, MD     Allergies   Allergen Reactions    Miralax [Polyethylene Glycol] Other (See Comments)     Seizure after consuming whole bottle for colonoscopy per patient    Codeine Nausea And Vomiting       OBJECTIVE:  Estimated body mass index is 30.26 kg/m² as calculated from the following:    Height as of this encounter: 5' 8\" (1.727 m). Weight as of this encounter: 199 lb (90.3 kg). Vitals:    03/23/21 1302   BP: 128/80   Site: Left Upper Arm   Position: Sitting   Cuff Size: Medium Adult   Pulse: 66   Temp: 97.2 °F (36.2 °C)   TempSrc: Temporal   SpO2: 96%   Weight: 199 lb (90.3 kg)   Height: 5' 8\" (1.727 m)       Physical Exam  Vitals signs and nursing note reviewed. Constitutional:       General: He is not in acute distress. Appearance: He is well-developed. He is not diaphoretic. HENT:      Head: Normocephalic and atraumatic. Right Ear: External ear normal.      Left Ear: External ear normal.      Nose: Nose normal.   Eyes:      General: Lids are normal. No scleral icterus. Right eye: No discharge. Left eye: No discharge. Pupils: Pupils are equal, round, and reactive to light. Neck:      Thyroid: No thyromegaly. Vascular: No JVD. Cardiovascular:      Rate and Rhythm: Normal rate and regular rhythm. Heart sounds: Murmur (3/6) present. Pulmonary:      Effort: Pulmonary effort is normal. No respiratory distress. Breath sounds: Normal breath sounds. Abdominal:      Palpations: Abdomen is soft. There is no hepatomegaly or splenomegaly. Tenderness: There is no abdominal tenderness. Skin:     General: Skin is warm and dry. Coloration: Skin is not pale. Findings: No erythema or rash. Comments: Turgor normal   Psychiatric:         Behavior: Behavior normal.         Thought Content: Thought content normal.         Judgment: Judgment normal.              ASSESSMENT PLAN      Diagnosis Orders   1. Benign essential HTN     2. Degenerative disc disease, lumbar     3. Coronary artery disease due to lipid rich plaque     4. Lumbosacral radiculopathy at L5     5. Pure hypercholesterolemia     6. Paroxysmal atrial fibrillation (HCC)     7.  H/O heart valve replacement with bioprosthetic valve     8. Hypotension, unspecified hypotension type     9. Duodenal ulcer  miSOPROStol (CYTOTEC) 100 MCG tablet   10. Rotator cuff impingement syndrome of left shoulder     Blood pressure acceptable. His backs about the same. No symptoms of coronary insufficiency. Sciatica about the same. 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 The dizziness is somewhat better. Cardiologist had reduced his blood pressure medication thinking had orthostasis. During the episodes of dizziness or near syncope patient's loop recorder did not show any abnormal rhythm. Clinically appear to be in sinus rhythm today. Remains on anticoagulation. He states that he has had melena a few times. He uses ibuprofen as needed states he has to have that for his back. I told him I was very concerned about the use of the ibuprofen, history of ulcer, documented bleeding. He was willing to try meloxicam and we added Cytotec and asked him to stay on the PPI. Find this may also help his shoulder. Exercises given. Annual follow-up, call sooner as needed. Patient should call the office immediately with new or ongoing signs or symptoms or worsening, or proceed to the emergency room. No changes in past medical history, past surgical history, social history, or family history were noted during the patient encounter unless specifically listed above. All updates of past medical history, past surgical history, social history, or family history were reviewed personally by me during the office visit. All problems listed in the assessment are stable unless noted otherwise. Medication profile reviewed personally by me during the visit. Medication side effects and possible impairments from medications were discussed as applicable. This document was prepared by a combination of typing and transcription through a voice recognition software.                 Scribe attestation: Coral Gracia MA, am scribing for and in the presence of Milady Rodriguez MD. Electronically signed by Mery Stokes MA on 3/23/2021 at 1:48 PM      Provider attestation:     I, Dr. Samantha Stokes, personally performed the services described in this documentation, as scribed by the above signed scribe in my presence, and it is both accurate and complete. I agree with the ROS and Past Histories independently gathered by the clinical support staff and the remaining scribed note accurately describes my personal service to the patient.       3/24/2021    10:04 AM

## 2021-03-23 NOTE — PATIENT INSTRUCTIONS
Take 15 mg Meloxicam (pain medication) daily instead of the Ibuprofen. Take the Cytotec (stomach medicine) 3 times a day. Patient should call the office immediately with new or ongoing signs or symptoms or worsening, or proceed to the emergency room. If you are on medications which could impair your senses, you are at risk of weakness, falls, dizziness, or drowsiness. You should be careful during activities which could place you at risk of harm, such as climbing, using stairs, operating machinery, or driving vehicles. If you feel you cannot safely do these activities, you should request others to help you, or avoid the activities altogether. If you are drowsy for any other reason, you should use the same precautions as listed above.      Web Address for Advance Directive:    [email or web address]

## 2021-03-29 ENCOUNTER — PATIENT MESSAGE (OUTPATIENT)
Dept: FAMILY MEDICINE CLINIC | Age: 61
End: 2021-03-29

## 2021-03-29 DIAGNOSIS — M25.512 CHRONIC LEFT SHOULDER PAIN: Primary | ICD-10-CM

## 2021-03-29 DIAGNOSIS — G89.29 CHRONIC LEFT SHOULDER PAIN: Primary | ICD-10-CM

## 2021-03-29 NOTE — TELEPHONE ENCOUNTER
orderd and my chart message sent back to patient informing of orders and okay to schedule to get done at [facility].

## 2021-03-30 ENCOUNTER — TELEPHONE (OUTPATIENT)
Dept: FAMILY MEDICINE CLINIC | Age: 61
End: 2021-03-30

## 2021-03-30 NOTE — TELEPHONE ENCOUNTER
----- Message from Kameron Campuzano sent at 3/30/2021  1:28 PM EDT -----  Subject: Message to Provider    QUESTIONS  Information for Provider? URGENT PT. spouse Gaby Jarrell call back to ask if the   mri and x-ray order was sent over to [facility]    she stated [facility] has not received the orders yet. please reach out to   [facility] asking [place] [facility] [place]  doing preauthorization   for [facility].  ---------------------------------------------------------------------------  --------------  Palak NAVAS  What is the best way for the office to contact you? OK to leave message on   voicemail  Preferred Call Back Phone Number? [phone]  ---------------------------------------------------------------------------  --------------  SCRIPT ANSWERS  Relationship to Patient? Other  Representative Name? Ally Barrera wife  Is the Representative on the appropriate HIPAA document in Epic?  Yes

## 2021-04-09 ENCOUNTER — HOSPITAL ENCOUNTER (OUTPATIENT)
Dept: GENERAL RADIOLOGY | Age: 61
Discharge: HOME | End: 2021-04-09
Payer: COMMERCIAL

## 2021-04-09 ENCOUNTER — HOSPITAL ENCOUNTER (OUTPATIENT)
Age: 61
Discharge: HOME | End: 2021-04-09
Payer: COMMERCIAL

## 2021-04-09 DIAGNOSIS — G89.29 CHRONIC LEFT SHOULDER PAIN: ICD-10-CM

## 2021-04-09 DIAGNOSIS — G89.29 CHRONIC LEFT SHOULDER PAIN: Primary | ICD-10-CM

## 2021-04-09 DIAGNOSIS — M25.512 CHRONIC LEFT SHOULDER PAIN: ICD-10-CM

## 2021-04-09 DIAGNOSIS — M25.512 CHRONIC LEFT SHOULDER PAIN: Primary | ICD-10-CM

## 2021-04-09 PROCEDURE — 73030 X-RAY EXAM OF SHOULDER: CPT

## 2021-04-22 ENCOUNTER — OFFICE VISIT (OUTPATIENT)
Dept: ORTHOPEDIC SURGERY | Age: 61
End: 2021-04-22
Payer: COMMERCIAL

## 2021-04-22 VITALS — HEIGHT: 70 IN | WEIGHT: 195 LBS | BODY MASS INDEX: 27.92 KG/M2

## 2021-04-22 DIAGNOSIS — S46.012A TRAUMATIC ROTATOR CUFF TEAR, LEFT, INITIAL ENCOUNTER: Primary | ICD-10-CM

## 2021-04-22 DIAGNOSIS — M75.22 BICIPITAL TENDINITIS OF LEFT SHOULDER: ICD-10-CM

## 2021-04-22 DIAGNOSIS — M75.42 IMPINGEMENT SYNDROME OF LEFT SHOULDER: ICD-10-CM

## 2021-04-22 PROCEDURE — 99204 OFFICE O/P NEW MOD 45 MIN: CPT | Performed by: ORTHOPAEDIC SURGERY

## 2021-04-22 PROCEDURE — 20610 DRAIN/INJ JOINT/BURSA W/O US: CPT | Performed by: ORTHOPAEDIC SURGERY

## 2021-04-22 NOTE — PROGRESS NOTES
times daily 120 tablet 11    meloxicam (MOBIC) 15 MG tablet TAKE 1 TABLET BY MOUTH DAILY 90 tablet 3    pantoprazole (PROTONIX) 40 MG tablet TAKE 1 TABLET BY MOUTH DAILY 90 tablet 3    apixaban (ELIQUIS) 5 MG TABS tablet Take 5 mg by mouth 2 times daily      rosuvastatin (CRESTOR) 40 MG tablet TAKE 1 TABLET BY MOUTH DAILY 30 tablet 1    metoprolol succinate (TOPROL XL) 25 MG extended release tablet Take 0.5 tablets by mouth 2 times daily 30 tablet 11    clopidogrel (PLAVIX) 75 MG tablet Take 75 mg by mouth daily.  ibuprofen (ADVIL;MOTRIN) 200 MG tablet Take 200 mg by mouth 3 times daily. No current facility-administered medications on file prior to visit. ASCVD 10-YEAR RISK SCORE  The 10-year ASCVD risk score ([address] et al., 2013) is: 8.9%    Values used to calculate the score:      Age: 57 years      Sex: Male      Is Non- : No      Diabetic: No      Tobacco smoker: No      Systolic Blood Pressure: 264 mmHg      Is BP treated: No      HDL Cholesterol: 39 mg/dL      Total Cholesterol: 139 mg/dL     Review of Systems  10-point ROS is negative other than HPI. Physical Exam  Based off 1997 Exam Criteria  Ht 5' 10\" (1.778 m)   Wt 195 lb (88.5 kg)   BMI 27.98 kg/m²      Constitutional:       General: He is not in acute distress. Appearance: Normal appearance. Cardiovascular:      Rate and Rhythm: Normal rate and regular rhythm. Pulses: Normal pulses. Pulmonary:      Effort: Pulmonary effort is normal. No respiratory distress. Neurological:      Mental Status: He is alert and oriented to person, place, and time. Mental status is at baseline.      Musculoskeletal:  Gait:  normal    Cervical Spine / Shoulder:      RIGHT  LEFT    Cervical Spine Exam  [x] All Neg    [x] All Neg     Spurling's  []  []Not tested   []  []Not tested    Muñiz's  []  []Not tested   []  []Not tested    Pain with rotation  []  []Not tested   []  []Not tested    Pain with lateral bending  []  []Not tested   []  []Not tested    Paraspinal muscle tenderness  [] Paraspinal  []Midline   [] Paraspinal  []Not tested    Sensation RIGHT  LEFT    Axillary  [x] Normal []Decreased    [x] Normal []Decreased   Musculocutaneous  [x] Normal  []Decreased   [x] Normal []Decreased   Median  [x] Normal []Decreased   [x] Normal []Decreased   Radial  [x] Normal  []Decreased   [x] Normal []Decreased   Ulnar  [x] Normal  []Decreased   [x] Normal []Decreased   Scapula       Position  [x]Nml  []low  [] lateral  [x]Nml  []low  [] lateral   Dyskinesia  []+ []Abn. Shrug   []+ []Abn. Shrug                     Winging     [x]None   []Med  []Lat   []Worse w/FE  []Med  []Lat  []Worse w/FE   Scapulothoracic Compress.    []Impr Pain  []Impr Motion  []Impr Pain []Impr Motion    Range of Motion Active Passive Active Passive   Forward Elevation 170  150    Abduction 100  90    External Rotation @ side 60  60    External Rotation @ 90 abd 90  80    Internal Rotation @ 90 abd 40  40    Internal Rotation L1  L1    End range of motion  [] Pain  [] Pain  [x] Pain  [] Pain   Strength RIGHT /5 LEFT /5   Abduction 5  5    External Rotation 5  5    Internal Rotation 5  4    Provocative Signs/Tests  [] All Neg   [x] +      [] -  [] All Neg   [x] +      [] -   Rotator Cuff Signs  [x] All Neg  [] Not tested   [] All Neg  [] Not tested    Neer  []  []Not tested   [x]  []Not tested    [place] Cordial  []  []Not tested   [x]  []Not tested    Painful arc  []  []Not tested   [x]  []Not tested    Greater tuberosity tenderness  []  []Not tested   []  []Not tested    Drop arm  []  []Not tested  []  []Not tested   Superior Escape  []  []Not tested   []  []Not tested    ER Lag  []  []Not tested   []  []Not tested    Belly press  []  []Not tested   []  []Not tested    Lift-off  []  []Not tested   []  []Not tested    Bear hug  []  []Not tested   []  []Not tested    Biceps/Labral Signs  [x] All Neg  [] Not tested   [] All Neg  [] Not tested    Tadeo's left shoulder  -     Cancel[facility]        Patient does have a near full-thickness rotator cuff tear. Clinically is subscapularis is doing better than it appears on MRI. Would recommend a cortisone injection and physical therapy concentrating on anterior middle deltoid strengthening. Shoulder injection is given intra-articular for his bicep tendinitis. He will follow-up in the office in 3 months or sooner if problems arise. I discussed with Arabella Garciadaylinnick that his history, symptoms, signs and imaging are most consistent with rotator cuff tear subscapularis, biceps tendinitis, SLAP tear    We reviewed the natural history of these conditions and treatment options ranging from conservative measures (rest, icing, activity modification, physical therapy, pain meds, cortisone injection) to surgical options. In terms of treatment, I recommended continuing with rest, icing, avoidance of painful activities, NSAIDs or pain meds as tolerated, and physical therapy. Left Shoulder Cortisone Injection: Glenohumeral CPT 79752   Consent was obtained after discussion of the risks, benefits, alternatives, including, but not limited to bleeding, pain, infection, skin disruption or discoloration. Laterality was confirmed (timeout). The shoulder was prepped with alcohol.  A formulation of 2cc of 40mg/ml Kenalog, 4cc of 1% lidocaine, 4cc of 0.25% marcaine was injected into the glenohumeral joint space with a 25 gauge needle without difficulty. The site was cleaned and dressed with a band aid.  He tolerated this well and there were no complications. We discussed surgical options as well, should conservative measures fail. Electronically signed by Slava Heredia MD on 4/22/2021 at 2:43 PM  This dictation was generated by voice recognition computer software.   Although all attempts are made to edit the dictation for accuracy, there may be errors in the transcription that are not intended.

## 2021-04-23 DIAGNOSIS — I10 BENIGN ESSENTIAL HTN: Primary | ICD-10-CM

## 2021-05-20 RX ORDER — TRIAMCINOLONE ACETONIDE 40 MG/ML
80 INJECTION, SUSPENSION INTRA-ARTICULAR; INTRAMUSCULAR ONCE
Status: COMPLETED | OUTPATIENT
Start: 2021-05-20 | End: 2021-05-20

## 2021-05-20 RX ADMIN — TRIAMCINOLONE ACETONIDE 80 MG: 40 INJECTION, SUSPENSION INTRA-ARTICULAR; INTRAMUSCULAR at 17:48

## 2021-07-27 ENCOUNTER — OFFICE VISIT (OUTPATIENT)
Dept: ORTHOPEDIC SURGERY | Age: 61
End: 2021-07-27
Payer: COMMERCIAL

## 2021-07-27 VITALS — BODY MASS INDEX: 27.92 KG/M2 | WEIGHT: 195 LBS | HEIGHT: 70 IN

## 2021-07-27 DIAGNOSIS — G56.02 CARPAL TUNNEL SYNDROME OF LEFT WRIST: Primary | ICD-10-CM

## 2021-07-27 DIAGNOSIS — G56.22 CUBITAL TUNNEL SYNDROME ON LEFT: ICD-10-CM

## 2021-07-27 DIAGNOSIS — M54.12 CERVICAL RADICULOPATHY: ICD-10-CM

## 2021-07-27 PROCEDURE — 99214 OFFICE O/P EST MOD 30 MIN: CPT | Performed by: ORTHOPAEDIC SURGERY

## 2021-07-27 NOTE — PROGRESS NOTES
Dr Juan Pablo Bhakta      Date /Time 7/27/2021             2:43 PM EDT  Name Tima Gimenez.             1960   Location  [facility]  MRN                 Chief Complaint   Patient presents with    Shoulder Pain     CK LEFT SHOULDER, CORTISONE INJ 4/22/21        History of Present Illness    Tima Nuñez is a 57 y.o. male who presents with  right Shoulder pain. Current history: Patient is here for follow-up visit concerning his right shoulder. At his last visit he was given an intra-articular cortisone injection. He is doing better at this time has no complaints involving his shoulder. His only complaint is weakness with gripping and pinching objects. He does have a numbness and tingling in his hand. He does also have some mild cervical pain. Previous history: Patient presents the office today for a new problem. Patient here with a chief complaint of right shoulder pain. Patient's right shoulder has been painful for many years. Original MRI was in 2016. He keeps falling. His most recent fall was in January. His pain is much more severe at this time. Pain concentrated over the anterior and lateral aspects of the shoulder. Increased pain with lifting activities. He denies any cervical pain or upper extremity radicular symptoms.     Past Medical History  Past Medical History:   Diagnosis Date    CAD (coronary artery disease)     Hypertension     Other disorders of kidney and ureter     stones    Pericarditis      Past Surgical History:   Procedure Laterality Date    CHOLECYSTECTOMY      CORONARY ANGIOPLASTY WITH STENT PLACEMENT       Social History     Tobacco Use    Smoking status: Never Smoker    Smokeless tobacco: Never Used   Substance Use Topics    Alcohol use: No     Alcohol/week: 0.0 standard drinks      Current Outpatient Medications on File Prior to Visit   Medication Sig Dispense Refill    metoprolol tartrate (LOPRESSOR) 25 MG tablet Take 0.5 tablets by mouth 2 times daily 90 tablet 3    meloxicam (MOBIC) 15 MG tablet TAKE 1 TABLET BY MOUTH DAILY 90 tablet 3    pantoprazole (PROTONIX) 40 MG tablet TAKE 1 TABLET BY MOUTH DAILY 90 tablet 3    apixaban (ELIQUIS) 5 MG TABS tablet Take 5 mg by mouth 2 times daily      rosuvastatin (CRESTOR) 40 MG tablet TAKE 1 TABLET BY MOUTH DAILY 30 tablet 1    clopidogrel (PLAVIX) 75 MG tablet Take 75 mg by mouth daily.  ibuprofen (ADVIL;MOTRIN) 200 MG tablet Take 200 mg by mouth 3 times daily. No current facility-administered medications on file prior to visit. ASCVD 10-YEAR RISK SCORE  The 10-year ASCVD risk score (Esmer Kim, et al., 2013) is: 10.3%    Values used to calculate the score:      Age: 57 years      Sex: Male      Is Non- : No      Diabetic: No      Tobacco smoker: No      Systolic Blood Pressure: 659 mmHg      Is BP treated: Yes      HDL Cholesterol: 39 mg/dL      Total Cholesterol: 139 mg/dL     Review of Systems  10-point ROS is negative other than HPI. Physical Exam  Based off 1997 Exam Criteria  Ht 5' 10\" (1.778 m)   Wt 195 lb (88.5 kg)   BMI 27.98 kg/m²      Constitutional:       General: He is not in acute distress. Appearance: Normal appearance. Cardiovascular:      Rate and Rhythm: Normal rate and regular rhythm. Pulses: Normal pulses. Pulmonary:      Effort: Pulmonary effort is normal. No respiratory distress. Neurological:      Mental Status: He is alert and oriented to person, place, and time. Mental status is at baseline.      Musculoskeletal:  Gait:  normal    Cervical Spine / Shoulder:      RIGHT  LEFT    Cervical Spine Exam  [x] All Neg    [] All Neg     Spurling's  []  []Not tested   [x]  []Not tested    Muñiz's  []  []Not tested   [x]  []Not tested    Pain with rotation  []  []Not tested   []  []Not tested    Pain with lateral bending  []  []Not tested   []  []Not tested    Paraspinal muscle tenderness  [] Paraspinal  []Midline   [x] Paraspinal  []Not tested    Sensation RIGHT  LEFT    Axillary  [x] Normal []Decreased    [x] Normal []Decreased   Musculocutaneous  [x] Normal  []Decreased   [x] Normal []Decreased   Median  [x] Normal []Decreased   [x] Normal []Decreased   Radial  [x] Normal  []Decreased   [x] Normal []Decreased   Ulnar  [x] Normal  []Decreased   [x] Normal []Decreased   Scapula       Position  [x]Nml  []low  [] lateral  [x]Nml  []low  [] lateral   Dyskinesia  []+ []Abn. Shrug   []+ []Abn. Shrug                     Winging     [x]None   []Med  []Lat   []Worse w/FE  []Med  []Lat  []Worse w/FE   Scapulothoracic Compress.    []Impr Pain  []Impr Motion  []Impr Pain []Impr Motion    Range of Motion Active Passive Active Passive   Forward Elevation 170  170    Abduction 100  90    External Rotation @ side 60  60    External Rotation @ 90 abd 90  80    Internal Rotation @ 90 abd 40  40    Internal Rotation L1  L1    End range of motion  [] Pain  [] Pain  [] Pain  [] Pain   Strength RIGHT /5 LEFT /5   Abduction 5  5    External Rotation 5  5    Internal Rotation 5  4    Provocative Signs/Tests  [] All Neg   [x] +      [] -  [] All Neg   [x] +      [] -   Rotator Cuff Signs  [x] All Neg  [] Not tested   [] All Neg  [] Not tested    Neer  []  []Not tested   [x]  []Not tested    Karene Latus  []  []Not tested   [x]  []Not tested    Painful arc  []  []Not tested   [x]  []Not tested    Greater tuberosity tenderness  []  []Not tested   []  []Not tested    Drop arm  []  []Not tested  []  []Not tested   Superior Escape  []  []Not tested   []  []Not tested    ER Lag  []  []Not tested   []  []Not tested    Belly press  []  []Not tested   []  []Not tested    Lift-off  []  []Not tested   []  []Not tested    Bear hug  []  []Not tested   []  []Not tested    Biceps/Labral Signs  [x] All Neg  [] Not tested   [] All Neg  [] Not tested    Tadeo's  []  []Not tested   [x]  []Not tested    Speed's  []  []Not tested   [x]  []Not tested Dynamic Load Shift/Shear  []  []Not tested   [x]  []Not tested    Clicking/Popping  []  []Not tested  []  []Not tested   Bicipital groove tenderness  []  []Not tested   [x]  []Not tested    Geoff  []  []Not tested   []  []Not tested    [facility] Joint Signs  [x] All Neg  [] Not tested   [x] All Neg  [] Not tested    [facility] joint tenderness  []  []Not tested   []  []Not tested    Cross-arm adduction pain  []  []Not tested   []  []Not tested    Instability Signs  [x] All Neg  [] Not tested  [x] All Neg  [] Not tested   General laxity (thumb/elbow)  []  []Not tested   []  []Not tested    Hyperabduction  []  []Not tested   []  []Not tested    Sulcus []Side   []ER    []Side   []ER       Anterior apprehension  []  []Not tested   []  []Not tested    Relocation  []   []Not tested  []  []Not tested     Physical exam of the left upper extremity demonstrates a positive Tinel's sign for for carpal and cubital tunnel syndrome. Also positive Phalen sign. Imaging  Left Shoulder: [facility] these x-rays were taken previously. They demonstrate no evidence of fractures or dislocations. MRI results        Findings concerning for full-thickness subscapularis failure, medial subluxation of the biceps tendon with significant degenerative appearance and SLAP tear. No evidence of tearing within the supraspinatus or infraspinatus. Significant edema present within the [facility] joint. Mild glenohumeral OA. Assessment and Plan  Melissa Mohr was seen today for shoulder pain. Diagnoses and all orders for this visit:    Carpal tunnel syndrome of left wrist  -     EMG; Future    Cubital tunnel syndrome on left  -     EMG; Future    Cervical radiculopathy  -     EMG; Future        Patient is doing well with his shoulder. We will order him an EMG of the left upper extremity to evaluate for carpal tunnel syndrome, cubital tunnel syndrome, and cervical radiculopathy.   He will call after the EMG results and I can help him direct him to appropriate physician. For his shoulder he will see us back in approximately 3 months. I discussed with Umair Garcia. that his history, symptoms, signs and imaging are most consistent with rotator cuff tear, cubital tunnel syndrome, carpal tunnel syndrome, and possible cervical radiculopathy    We reviewed the natural history of these conditions and treatment options ranging from conservative measures (rest, icing, activity modification, physical therapy, pain meds, cortisone injection) to surgical options. In terms of treatment, I recommended continuing with rest, icing, avoidance of painful activities, NSAIDs or pain meds as tolerated, and physical therapy. We discussed surgical options as well, should conservative measures fail. Electronically signed by Christian Quinones MD on 7/27/2021 at 4:45 PM  This dictation was generated by voice recognition computer software. Although all attempts are made to edit the dictation for accuracy, there may be errors in the transcription that are not intended.

## 2021-12-10 RX ORDER — PANTOPRAZOLE SODIUM 40 MG/1
TABLET, DELAYED RELEASE ORAL
Qty: 90 TABLET | Refills: 3 | Status: SHIPPED | OUTPATIENT
Start: 2021-12-10 | End: 2022-09-13

## 2021-12-27 ENCOUNTER — PATIENT MESSAGE (OUTPATIENT)
Dept: FAMILY MEDICINE CLINIC | Age: 61
End: 2021-12-27

## 2022-01-05 ENCOUNTER — OFFICE VISIT (OUTPATIENT)
Dept: FAMILY MEDICINE CLINIC | Age: 62
End: 2022-01-05
Payer: COMMERCIAL

## 2022-01-05 VITALS
HEIGHT: 70 IN | DIASTOLIC BLOOD PRESSURE: 85 MMHG | OXYGEN SATURATION: 96 % | WEIGHT: 210 LBS | SYSTOLIC BLOOD PRESSURE: 124 MMHG | BODY MASS INDEX: 30.06 KG/M2 | HEART RATE: 68 BPM

## 2022-01-05 DIAGNOSIS — Z01.818 PREOP EXAMINATION: Primary | ICD-10-CM

## 2022-01-05 LAB
ANION GAP SERPL CALCULATED.3IONS-SCNC: 11 MMOL/L (ref 3–16)
BASOPHILS ABSOLUTE: 0.1 K/UL (ref 0–0.2)
BASOPHILS RELATIVE PERCENT: 1.4 %
BILIRUBIN, POC: NEGATIVE
BLOOD URINE, POC: NEGATIVE
BUN BLDV-MCNC: 19 MG/DL (ref 7–20)
CALCIUM SERPL-MCNC: 9.5 MG/DL (ref 8.3–10.6)
CHLORIDE BLD-SCNC: 105 MMOL/L (ref 99–110)
CLARITY, POC: NORMAL
CO2: 27 MMOL/L (ref 21–32)
COLOR, POC: NORMAL
CREAT SERPL-MCNC: 0.9 MG/DL (ref 0.8–1.3)
EOSINOPHILS ABSOLUTE: 0.2 K/UL (ref 0–0.6)
EOSINOPHILS RELATIVE PERCENT: 5 %
GFR AFRICAN AMERICAN: >60
GFR NON-AFRICAN AMERICAN: >60
GLUCOSE BLD-MCNC: 103 MG/DL (ref 70–99)
GLUCOSE URINE, POC: NEGATIVE
HCT VFR BLD CALC: 49.3 % (ref 40.5–52.5)
HEMOGLOBIN: 16.9 G/DL (ref 13.5–17.5)
KETONES, POC: NEGATIVE
LEUKOCYTE EST, POC: NEGATIVE
LYMPHOCYTES ABSOLUTE: 1.2 K/UL (ref 1–5.1)
LYMPHOCYTES RELATIVE PERCENT: 28.5 %
MCH RBC QN AUTO: 30.4 PG (ref 26–34)
MCHC RBC AUTO-ENTMCNC: 34.3 G/DL (ref 31–36)
MCV RBC AUTO: 88.6 FL (ref 80–100)
MONOCYTES ABSOLUTE: 0.4 K/UL (ref 0–1.3)
MONOCYTES RELATIVE PERCENT: 9.4 %
NEUTROPHILS ABSOLUTE: 2.4 K/UL (ref 1.7–7.7)
NEUTROPHILS RELATIVE PERCENT: 55.7 %
NITRITE, POC: NEGATIVE
PDW BLD-RTO: 13.4 % (ref 12.4–15.4)
PH, POC: 5.5
PLATELET # BLD: 147 K/UL (ref 135–450)
PMV BLD AUTO: 8.3 FL (ref 5–10.5)
POTASSIUM SERPL-SCNC: 5 MMOL/L (ref 3.5–5.1)
PROTEIN, POC: NEGATIVE
RBC # BLD: 5.56 M/UL (ref 4.2–5.9)
SODIUM BLD-SCNC: 143 MMOL/L (ref 136–145)
SPECIFIC GRAVITY, POC: >=1.03
UROBILINOGEN, POC: NORMAL
WBC # BLD: 4.3 K/UL (ref 4–11)

## 2022-01-05 PROCEDURE — 99243 OFF/OP CNSLTJ NEW/EST LOW 30: CPT | Performed by: NURSE PRACTITIONER

## 2022-01-05 PROCEDURE — 81002 URINALYSIS NONAUTO W/O SCOPE: CPT | Performed by: NURSE PRACTITIONER

## 2022-01-05 NOTE — PROGRESS NOTES
[address] [facility] SITE                             Preoperative Evaluation        Marleni Tavares. YOB: 1960    Date of Service:  1/5/2022    Vitals:    01/05/22 1006   BP: 124/85   Site: Right Upper Arm   Position: Sitting   Cuff Size: Large Adult   Pulse: 68   SpO2: 96%   Weight: 210 lb (95.3 kg)   Height: 5' 10\" (1.778 m)      Wt Readings from Last 2 Encounters:   01/05/22 210 lb (95.3 kg)   07/27/21 195 lb (88.5 kg)     BP Readings from Last 3 Encounters:   01/05/22 124/85   03/23/21 128/80   04/30/19 112/80        Chief Complaint   Patient presents with    Pre-op Exam     pt is here for pre op for left shoulder surgery with Dr Yvette Wayne on 1/10/22 at [facility] on [address] [place] [address]. Allergies   Allergen Reactions    Miralax [Polyethylene Glycol] Other (See Comments)     Seizure after consuming whole bottle for colonoscopy per patient    Quinolones Other (See Comments)     Thoracic ascending aortic aneurysm    Codeine Nausea And Vomiting     Outpatient Medications Marked as Taking for the 1/5/22 encounter (Office Visit) with JACOBO Carlos CNP   Medication Sig Dispense Refill    pantoprazole (PROTONIX) 40 MG tablet TAKE 1 TABLET BY MOUTH DAILY 90 tablet 3    metoprolol tartrate (LOPRESSOR) 25 MG tablet Take 0.5 tablets by mouth 2 times daily 90 tablet 3    meloxicam (MOBIC) 15 MG tablet TAKE 1 TABLET BY MOUTH DAILY 90 tablet 3    apixaban (ELIQUIS) 5 MG TABS tablet Take 5 mg by mouth 2 times daily      rosuvastatin (CRESTOR) 40 MG tablet TAKE 1 TABLET BY MOUTH DAILY 30 tablet 1    clopidogrel (PLAVIX) 75 MG tablet Take 75 mg by mouth daily.            This patient presents to the office today for a preoperative consultation at the request of surgeon, Ana Hodge, who plans on performing left shoulder video arthroscopy rotator cuff repair, possible biceps tenodesis, possible arthroscopic assisted latissimus dorsi transfer on January 10 at [address]. The current problem began about one year ago, and symptoms have been worsening with time.      Planned anesthesia: General   Known anesthesia problems: None   Bleeding risk: Anticoagulant therapy- Eliquis  Personal or FH of DVT/PE: No    Patient objection to receiving blood products: Patient is okay with blood and blood products     Patient Active Problem List   Diagnosis    Degenerative disc disease, lumbar    Facet arthropathy, lumbar    Coronary artery disease due to lipid rich plaque    Lumbosacral radiculopathy at L5    Benign essential HTN    TIA (transient ischemic attack)    Ataxia    Headache    Pure hypercholesterolemia    Paroxysmal atrial fibrillation ([address]    H/O heart valve replacement with bioprosthetic valve    Hypotension    Duodenal ulcer    Rotator cuff impingement syndrome of left shoulder       Past Medical History:   Diagnosis Date    CAD (coronary artery disease)     Hypertension     Other disorders of kidney and ureter     stones    Pericarditis      Past Surgical History:   Procedure Laterality Date    CHOLECYSTECTOMY      CORONARY ANGIOPLASTY WITH STENT PLACEMENT       Family History   Problem Relation Age of Onset    Diabetes Mother     High Blood Pressure Mother      Social History     Socioeconomic History    Marital status:      Spouse name: Not on file    Number of children: Not on file    Years of education: Not on file    Highest education level: Not on file   Occupational History    Not on file   Tobacco Use    Smoking status: Never Smoker    Smokeless tobacco: Never Used   Substance and Sexual Activity    Alcohol use: No     Alcohol/week: 0.0 standard drinks    Drug use: No    Sexual activity: Yes     Partners: Female   Other Topics Concern    Not on file   Social History Narrative    Not on file     Social Determinants of Health     Financial Resource Strain:     Difficulty of Paying Living Expenses: Not on file   Food Insecurity:     Worried About Running Out of Food in the Last Year: Not on file    Mohini of Food in the Last Year: Not on file   Transportation Needs:     Lack of Transportation (Medical): Not on file    Lack of Transportation (Non-Medical): Not on file   Physical Activity:     Days of Exercise per Week: Not on file    Minutes of Exercise per Session: Not on file   Stress:     Feeling of Stress : Not on file   Social Connections:     Frequency of Communication with Friends and Family: Not on file    Frequency of Social Gatherings with Friends and Family: Not on file    Attends [employer] Services: Not on file    Active Member of [address] [place] [address] or Organizations: Not on file    Attends Club or Organization Meetings: Not on file    Marital Status: Not on file   Intimate Partner Violence:     Fear of Current or Ex-Partner: Not on file    Emotionally Abused: Not on file    Physically Abused: Not on file    Sexually Abused: Not on file   Housing Stability:     Unable to Pay for Housing in the Last Year: Not on file    Number of [place] in the Last Year: Not on file    Unstable Housing in the Last Year: Not on file       Review of Systems  A comprehensive review of systems was negative except for: shoulder pain      Physical Exam   Constitutional: He is oriented to person, place, and time. He appears well-developed and well-nourished. No distress. HENT:   Head: Normocephalic and atraumatic. Mouth/Throat: Uvula is midline, oropharynx is clear and moist and mucous membranes are normal.   Eyes: Conjunctivae and EOM are normal. Pupils are equal, round, and reactive to light. Neck: Trachea normal and normal range of motion. Neck supple. No JVD present. Carotid bruit is not present. No mass and no thyromegaly present. Cardiovascular: Normal rate, regular rhythm, normal heart sounds and intact distal pulses.   Exam reveals no gallop and no friction rub.  +6/5 systolic murmur heard. Pulmonary/Chest: Effort normal and breath sounds normal. No respiratory distress. He has no wheezes. He has no rales. Abdominal: Soft. Normal aorta and bowel sounds are normal. He exhibits no distension and no mass. There is no hepatosplenomegaly. No tenderness. Musculoskeletal: He exhibits no edema and no tenderness. Neurological: He is alert and oriented to person, place, and time. He has normal strength. No cranial nerve deficit or sensory deficit. Coordination and gait normal.   Skin: Skin is warm and dry. No rash noted. No erythema. Psychiatric: He has a normal mood and affect. His behavior is normal.     EKG Interpretation:  Performed by cardiology as patient needed cardiac clearance per patient     Lab Review   No visits with results within 6 Month(s) from this visit.    Latest known visit with results is:   Nurse Only on 03/18/2021   Component Date Value    WBC 03/18/2021 4.3     RBC 03/18/2021 5.07     Hemoglobin 03/18/2021 15.7     Hematocrit 03/18/2021 45.6     MCV 03/18/2021 90.0     MCH 03/18/2021 30.9     MCHC 03/18/2021 34.4     RDW 03/18/2021 13.3     Platelets 14/64/4754 172     MPV 03/18/2021 9.0     Neutrophils % 03/18/2021 59.0     Lymphocytes % 03/18/2021 27.2     Monocytes % 03/18/2021 7.4     Eosinophils % 03/18/2021 5.5     Basophils % 03/18/2021 0.9     Neutrophils Absolute 03/18/2021 2.5     Lymphocytes Absolute 03/18/2021 1.2     Monocytes Absolute 03/18/2021 0.3     Eosinophils Absolute 03/18/2021 0.2     Basophils Absolute 03/18/2021 0.0     PSA 03/18/2021 2.48     Cholesterol, Total 03/18/2021 139     Triglycerides 03/18/2021 109     HDL 03/18/2021 39*    LDL Calculated 03/18/2021 78     VLDL Cholesterol Calcula* 03/18/2021 22     Sodium 03/18/2021 141     Potassium 03/18/2021 4.7     Chloride 03/18/2021 105     CO2 03/18/2021 28     Anion Gap 03/18/2021 8     Glucose 03/18/2021 85     BUN 03/18/2021 16     CREATININE 03/18/2021 0.8     GFR Non- 03/18/2021 >60     GFR  03/18/2021 >60     Calcium 03/18/2021 9.4     Total Protein 03/18/2021 6.9     Albumin 03/18/2021 4.6     Albumin/Globulin Ratio 03/18/2021 2.0     Total Bilirubin 03/18/2021 0.9     Alkaline Phosphatase 03/18/2021 61     ALT 03/18/2021 20     AST 03/18/2021 20     Globulin 03/18/2021 2.3            Assessment:       57 y.o. patient with planned surgery as above. Known risk factors for perioperative complications: Coronary artery disease, Hypertension, atrial fibrillation, heart valve replacement   Current medications which may produce withdrawal symptoms if withheld perioperatively: none     1. Preop examination         Plan:     1. Preoperative workup as follows: hemoglobin, hematocrit, electrolytes, creatinine, liver function studies  2. Further recommendations from consultants: Yes - cardiac clearance needed; Per patient - cardiac clearance given; Please see cardiac clearance given by Dr. Miracle Rolle  3. Change in medication regimen before surgery: Take Pantoprazole and Metoprolol on morning of surgery with sip of water, and hold all other medications until after surgery. Stop NSAIDS (Motrin, Aleve, Ibuprofen), vitamin E, aspirin, fish oil 7-10 days prior to surgery unless instructed otherwise by surgeon. Stop your Eliquis and Plavix per cardiology and surgeon's recommendations  4.  Prophylaxis for cardiac events with perioperative beta-blockers: Currently taking  metoprolol  ACC/AHA indications for pre-operative beta-blocker use:    · Vascular surgery with history of postitive stress test  · Intermediate or high risk surgery with history of CAD   · Intermediate or high risk surgery with multiple clinical predictors of CAD- 2 of the following: history of compensated or prior heart failure, history of cerebrovascular disease, DM, or renal insufficiency    Routine administration of higher-dose, long-acting metoprolol in beta-blocker-naïve patients on the day of surgery, and in the absence of dose titration is associated with an overall increase in mortality. Beta-blockers should be started days to weeks prior to surgery and titrated to pulse < 70.  5. Deep vein thrombosis prophylaxis: regimen to be chosen by surgical team  6. No contraindications to planned surgery pending appropriate labs and cardiac clearance already given by Dr. America Hastings       If you have questions, please do not hesitate to call me ([phone]).      Sincerely,    Brianne Slaughter, DNP, APRN, FNP-BC

## 2022-01-24 ENCOUNTER — HOSPITAL ENCOUNTER (OUTPATIENT)
Dept: PHYSICAL THERAPY | Age: 62
Setting detail: THERAPIES SERIES
Discharge: HOME | End: 2022-01-24
Payer: COMMERCIAL

## 2022-01-24 PROCEDURE — 97140 MANUAL THERAPY 1/> REGIONS: CPT

## 2022-01-24 PROCEDURE — 97110 THERAPEUTIC EXERCISES: CPT

## 2022-01-24 PROCEDURE — 97161 PT EVAL LOW COMPLEX 20 MIN: CPT

## 2022-01-24 PROCEDURE — 97112 NEUROMUSCULAR REEDUCATION: CPT

## 2022-01-24 NOTE — PLAN OF CARE
[facility], [address] [place] [address] [address] [facility], [address], [place], [address] [place] [address]  Phone: [phone], [phone]                                                    Physical Therapy Certification    Dear Referring Practitioner: Sofy Bagley MD,    We had the pleasure of evaluating the following patient for physical therapy services at [address] [place] [address]. A summary of our findings can be found in the initial assessment below. This includes our plan of care. If you have any questions or concerns regarding these findings, please do not hesitate to contact me at the office phone number checked above. Thank you for the referral.       Physician Signature:_______________________________Date:__________________  By signing above (or electronic signature), therapists plan is approved by physician      Patient: Sachi Burns. : 1960   MRN: [id number]  Referring Physician: Referring Practitioner: Sofy Bagley MD      Evaluation Date: 2022      Medical Diagnosis Information:  Diagnosis: Left Rotator Cuff Repair on 1/10/22   Treatment Diagnosis: M75.102                                         Insurance information: PT Insurance Information: [employer]    Precautions/ Contra-indications/Relevant Medical History: HTN, Hx of heart problems    C-SSRS Triggered by Intake questionnaire (Past 2 wk assessment):   [x] No, Questionnaire did not trigger screening.   [] Yes, Patient intake triggered further evaluation      [] C-SSRS Screening completed  [] PCP notified via Plan of Care  [] Emergency services notified     Latex Allergy:  [x]NO      []YES   Preferred Language for Healthcare:   [x]English       []other:    SUBJECTIVE: Patient stated complaint: Patient is a 58 y/o male who arrives at therapy clinic for initial evaluation s/p Left Rotator Cuff Repair and subsequent acromial decompression on 1/10/2022.  Patient reports main and observation. Intake form has been scanned into medical record. Patient has been instructed to contact their primary care physician regarding ROS issues if not already being addressed at this time. Co-morbidities/Complexities (which will affect course of rehabilitation):   []None           Arthritic conditions   []Rheumatoid arthritis (M05.9)  []Osteoarthritis (M19.91)   Cardiovascular conditions   [x]Hypertension (I10)  []Hyperlipidemia (E78.5)  []Angina pectoris (I20)  []Atherosclerosis (I70)   Musculoskeletal conditions   []Disc pathology   []Congenital spine pathologies   []Prior surgical intervention  []Osteoporosis (M81.8)  []Osteopenia (M85.8)   Endocrine conditions   []Hypothyroid (E03.9)  []Hyperthyroid Gastrointestinal conditions   []Constipation (Q43.01)   Metabolic conditions   []Morbid obesity (E66.01)  []Diabetes type 1(E10.65) or 2 (E11.65)   []Neuropathy (G60.9)     Pulmonary conditions   []Asthma (J45)  []Coughing   []COPD (J44.9)   Psychological Disorders  []Anxiety (F41.9)  []Depression (F32.9)   []Other:   []Other:          Barriers to/and or personal factors that will affect rehab potential:              []Age  []Sex              []Motivation/Lack of Motivation                        [x]Co-Morbidities              []Cognitive Function, education/learning barriers              []Environmental, home barriers              [x]profession/work barriers  []past PT/medical experience  []other:  Justification:     Falls Risk Assessment (30 days):  [x] Falls Risk assessed and no intervention required.   [] Falls Risk assessed and Patient requires intervention due to being higher risk   TUG score (>12s at risk):     [] Falls education provided, including     ASSESSMENT:   Functional Impairments   [x]Noted spinal or UE joint hypomobility   []Noted spinal or UE joint hypermobility   [x]Decreased UE functional ROM   [x]Decreased UE functional strength   []Abnormal reflexes/sensation/myotomal/dermatomal deficits   [x]Decreased RC/scapular/core strength and neuromuscular control   []other:      Functional Activity Limitations (from functional questionnaire and intake)   [x]Reduced ability to tolerate prolonged functional positions   [x]Reduced ability or difficulty with changes of positions or transfers between positions   [x]Reduced ability to maintain good posture and demonstrate good body mechanics with sitting, bending, and lifting   [x] Reduced ability or tolerance with driving and/or computer work   []Reduced ability to sleep   [x]Reduced ability to perform lifting, reaching, carrying tasks   [x]Reduced ability to tolerate impact through UE   [x]Reduced ability to reach behind back   [x]Reduced ability to  or hold objects   [x]Reduced ability to throw or toss an object   []other:    Participation Restrictions   [x]Reduced participation in self care activities   [x]Reduced participation in home management activities   [x]Reduced participation in work activities   [x]Reduced participation in social activities. []Reduced participation in sport/recreation activities. Classification:   [x]Signs/symptoms consistent with post-surgical status including decreased ROM, strength and function. []Signs/symptoms consistent with joint sprain/strain   []Signs/symptoms consistent with shoulder impingement   []Signs/symptoms consistent with shoulder/elbow/wrist tendinopathy   []Signs/symptoms consistent with Rotator cuff tear   []Signs/symptoms consistent with labral tear   []Signs/symptoms consistent with postural dysfunction    []Signs/symptoms consistent with Glenohumeral IR Deficit - <45 degrees   []Signs/symptoms consistent with facet dysfunction of cervical/thoracic spine    []Signs/symptoms consistent with pathology which may benefit from Dry needling     []other:      Tolerance of evaluation/treatment:    []Excellent   [x]Good    []Fair   []Poor    Physical Therapy Evaluation Complexity Justification   [x] A history of present problem with:  [] no personal factors and/or comorbidities that impact the plan of care;  [x]1-2 personal factors and/or comorbidities that impact the plan of care  []3 personal factors and/or comorbidities that impact the plan of care  [x] An examination of body systems using standardized tests and measures addressing any of the following: body structures and functions (impairments), activity limitations, and/or participation restrictions;:  [] a total of 1-2 or more elements   [x] a total of 3 or more elements   [] a total of 4 or more elements   [x] A clinical presentation with:  [x] stable and/or uncomplicated characteristics   [] evolving clinical presentation with changing characteristics  [] unstable and unpredictable characteristics;   [x] Clinical decision making of [x] low, [] moderate, [] high complexity using standardized patient assessment instrument and/or measurable assessment of functional outcome. [x] EVAL (LOW) 07465 (typically 20 minutes face-to-face)  [] EVAL (MOD) 42760 (typically 30 minutes face-to-face)  [] EVAL (HIGH) 90280 (typically 45 minutes face-to-face)  [] RE-EVAL     PLAN:  Frequency/Duration: 1-2 days per week for 12 weeks:  INTERVENTIONS:  [x]  Therapeutic exercise including: strength training, ROM, for upper extremity and core   [x]  NMR activation and proprioception for UE and Core   [x]  Manual therapy as indicated for UE and spine to include: Dry Needling/IASTM, STM, PROM, Gr I-IV mobilizations, manipulation. [x] Modalities as needed that may include: thermal agents, E-stim, Biofeedback, US, iontophoresis as indicated  [x] Patient education on joint protection, postural re-education, activity modification, progression of HEP. HEP instruction: Refer to [address] [place] [address] access code and exercises on the 1st visit treatment note    GOALS:    Short Term Goals: To be achieved in: 2 weeks  1.  Independent in HEP and progression per patient tolerance, in order to prevent re-injury. [] Progressing: [] Met: [] Not Met: [] Adjusted   2. Patient will have a decrease in pain to facilitate improvement in movement, function, and ADLs as indicated by Functional Deficits. [] Progressing: [] Met: [] Not Met: [] Adjusted     Long Term Goals: To be achieved in: 12 weeks  1. Disability index score of 20% or less for the QuickDash/Oswestry to assist with reaching prior level of function. [] Progressing: [] Met: [] Not Met: [] Adjusted   2. Patient will demonstrate increased AROM on the left to equal the opposite side bilaterally to allow for ability to reach overhead pain and restriction free as indicated by patients Functional Deficits. [] Progressing: [] Met: [] Not Met: [] Adjusted   3. Patient will demonstrate an increase in strength of the L UE to 4+/5 to allow for ability to perform all functional activities without restriction as indicated by patients Functional Deficits. [] Progressing: [] Met: [] Not Met: [] Adjusted   4. Patient will return to all transfers, work activities, and functional activities without increased symptoms or restriction. [] Progressing: [] Met: [] Not Met: [] Adjusted   5. Patient will have 0/10 pain with ADL's.  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    [] Progressing: [] Met: [] Not Met: [] Adjusted      Electronically signed by: Koby Sierra, MAMADOU West, PT, MPT, ATC, Cert DN

## 2022-01-24 NOTE — FLOWSHEET NOTE
[facility], [facility], [address] [place] [address] Cynthia [place], [address]  Phone: [phone], [phone]    Physical Therapy Treatment Note/ Progress Report:     Date:  2022    Patient Name:  Eldon Lazaro.     :  1960  MRN: [id number]  Restrictions/Precautions:    Medical/Treatment Diagnosis Information:  Diagnosis: Left Rotator Cuff Repair on 1/10/22  Treatment Diagnosis: I39.365  Insurance/Certification information:  PT Insurance Information: [employer]  Physician Information:  Referring Practitioner: Nilton Brown MD  Has the plan of care been signed (Y/N):        []  Yes  [x]  No     Date of Patient follow up with Physician: 3/8/2022    Is this a Progress Report:     []  Yes  [x]  No      If Yes:  Date Range for reporting period:  Initial Eval: 2022  Beginnin2022 --- Endin2022    Progress report will be due (10 Rx or 30 days whichever is less): 7558    Recertification will be due (POC Duration  / 90 days whichever is less): 2022     Visit # Insurance Allowable Auth Required   In Person 1 20 visits []  Yes     []  No    Tele Health   []  Yes     []  No    Total 1         Functional Scale: Quick Dash: 73% (Total Number Sum: 43/55)   Date assessed: 2022     Latex Allergy:  [x]NO      []YES  Preferred Language for Healthcare:   [x]English       []other:    Pain level: 0/10 currently; 8/10 with activity     SUBJECTIVE: See eval    OBJECTIVE: See eval   Observation:    Test measurements:      RESTRICTIONS/PRECAUTIONS: HTN, Hx of heart problems          Exercises/Interventions:   Therapeutic Ex (94564)  Therapeutic Activity (90771)  NMR re-education (66135) Sets/Reps Notes/CUES             Scap squeezes 3x10    Shoulder shrugs 3x10    Wrist AROM 3x10 ea    UT stretch 30\" x 3    Ball squeezes 3x10    Elbow flexion AROM 3x10         Standing pendulums 3x10 Flex/ext, clock, counter Manual Intervention (04746)     Shoulder PROM- gentle 10' Flexion to 90°, abd to 90°, IR, ER to neutral                               Medbridge access code: Mayo Sparks  Access Code: Mayo Sparks  URL: [email or web address]  Date: 01/24/2022  Prepared by: Analilia Cherry    Exercises  Seated Scapular Retraction - 1-2 x daily - 7 x weekly - 3 sets - 10 reps - 5 s hold  Seated Shoulder Shrugs - 1-2 x daily - 7 x weekly - 3 sets - 10 reps  Seated Neck Sidebending Stretch - 1-2 x daily - 7 x weekly - 3 reps - 30s hold  Wrist AROM Radial Ulnar Deviation - 1-2 x daily - 7 x weekly - 3 sets - 10 reps  Forearm Strengthening with Ball Squeeze - 1-2 x daily - 7 x weekly - 3 sets - 10 reps  Seated Wrist Extension AROM - 1-2 x daily - 7 x weekly - 3 sets - 10 reps  Wrist Flexion Extension AROM with Fingers Curled and Palm Down - 1-2 x daily - 7 x weekly - 3 sets - 10 reps  Circular Shoulder Pendulum with Table Support - 1-2 x daily - 7 x weekly - 3 sets - 10 reps  Flexion-Extension Shoulder Pendulum with Table Support - 1-2 x daily - 7 x weekly - 3 sets - 10 reps  Seated Elbow Flexion Extension AROM - 1-2 x daily - 7 x weekly - 3 sets - 10 reps                  Patient Education 10'  Pt education with HEP and progression of PT along with compliance with HEP to aide with formal PT for optimal outcomes. Therapeutic Exercise and NMR EXR  [x] (00990) Provided verbal/tactile cueing for activities related to strengthening, flexibility, endurance, ROM  for improvements in scapular, scapulothoracic and UE control with self care, reaching, carrying, lifting, house/yardwork, driving/computer work. [x] (19174) Provided verbal/tactile cueing for activities related to improving balance, coordination, kinesthetic sense, posture, motor skill, proprioception  to assist with  scapular, scapulothoracic and UE control with self care, reaching, carrying, lifting, house/yardwork, driving/computer work.     Therapeutic Activities:    [x] (20316 or ) Provided verbal/tactile cueing for activities related to improving balance, coordination, kinesthetic sense, posture, motor skill, proprioception and motor activation to allow for proper function of scapular, scapulothoracic and UE control with self care, carrying, lifting, driving/computer work.      Home Exercise Program:    [x] (47574) Reviewed/Progressed HEP activities related to strengthening, flexibility, endurance, ROM of scapular, scapulothoracic and UE control with self care, reaching, carrying, lifting, house/yardwork, driving/computer work  [x] (36849) Reviewed/Progressed HEP activities related to improving balance, coordination, kinesthetic sense, posture, motor skill, proprioception of scapular, scapulothoracic and UE control with self care, reaching, carrying, lifting, house/yardwork, driving/computer work      Manual Treatments:  PROM / STM / Oscillations-Mobs:  G-I, II, III, IV (PA's, Inf., Post.)  [x] (51015) Provided manual therapy to mobilize soft tissue/joints of cervical/CT, scapular GHJ and UE for the purpose of modulating pain, promoting relaxation,  increasing ROM, reducing/eliminating soft tissue swelling/inflammation/restriction, improving soft tissue extensibility and allowing for proper ROM for normal function with self care, reaching, carrying, lifting, house/yardwork, driving/computer work    Modalities:      Charges:  Timed Code Treatment Minutes: 40'   Total Treatment Minutes:  60'   BWC:  TE TIME:  NMR TIME:  MANUAL TIME:  UNTIMED MINUTES:  Medicare Total:         [x] EVAL (LOW) 37176 (typically 20 minutes face-to-face)  [] EVAL (MOD) 23053 (typically 30 minutes face-to-face)  [] EVAL (HIGH) 68937 (typically 45 minutes face-to-face)  [] RE-EVAL     [x] BB(42418) x 1    [] IONTO  [x] NMR (57860) x 1    [] VASO  [x] Manual (20803) x 1    [] Other:  [] TA x      [] Mech Traction (21879)  [] ES(attended) (20262)     [] ES (un) (33572):    ASSESSMENT:  See eval    GOALS: Short Term Goals: To be achieved in: 2 weeks  1. Independent in HEP and progression per patient tolerance, in order to prevent re-injury. [] Progressing: [] Met: [] Not Met: [] Adjusted   2. Patient will have a decrease in pain to facilitate improvement in movement, function, and ADLs as indicated by Functional Deficits. [] Progressing: [] Met: [] Not Met: [] Adjusted     Long Term Goals: To be achieved in: 12 weeks  1. Disability index score of 20% or less for the QuickDash/Oswestry to assist with reaching prior level of function. [] Progressing: [] Met: [] Not Met: [] Adjusted   2. Patient will demonstrate increased AROM on the left to equal the opposite side bilaterally to allow for ability to reach overhead pain and restriction free as indicated by patients Functional Deficits. [] Progressing: [] Met: [] Not Met: [] Adjusted   3. Patient will demonstrate an increase in strength of the L UE to 4+/5 to allow for ability to perform all functional activities without restriction as indicated by patients Functional Deficits. [] Progressing: [] Met: [] Not Met: [] Adjusted   4. Patient will return to all transfers, work activities, and functional activities without increased symptoms or restriction. [] Progressing: [] Met: [] Not Met: [] Adjusted   5. Patient will have 0/10 pain with ADL's.  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 [] Progressing: [] Met: [] Not Met: [] Adjusted     Overall Progression Towards Functional goals/ Treatment Progress Update:  [] Patient is progressing as expected towards functional goals listed. [] Progression is slowed due to complexities/Impairments listed. [] Progression has been slowed due to co-morbidities.   [x] Plan just implemented, too soon to assess goals progression <30days   [] Goals require adjustment due to lack of progress  [] Patient is not progressing as expected and requires additional follow up with physician  [] Other    Prognosis for POC: [x] Good [] Fair  [] Poor    Patient requires continued skilled intervention: [x] Yes  [] No    Treatment/Activity Tolerance:  [x] Patient able to complete treatment  [] Patient limited by fatigue  [] Patient limited by pain    [] Patient limited by other medical complications  [] Other:     Return to Play: (if applicable)   []  Stage 1: Intro to Strength   []  Stage 2: Return to Run and Strength   []  Stage 3: Return to Jump and Strength   []  Stage 4: Dynamic Strength and Agility   []  Stage 5: Sport Specific Training     []  Ready to Return to Play, Meets All Above Stages   []  Not Ready for Return to Sports   Comments:                         PLAN: See eval  [] Continue per plan of care [] Alter current plan (see comments above)  [x] Plan of care initiated [] Hold pending MD visit [] Discharge    Electronically signed by: Chico Galvan, MAMADOU [place], PT, MPT, ATC, Cert DN    Note: If patient does not return for scheduled/ recommended follow up visits, this note will serve as a discharge from care along with most recent update on progress.

## 2022-01-27 ENCOUNTER — HOSPITAL ENCOUNTER (OUTPATIENT)
Dept: PHYSICAL THERAPY | Age: 62
Setting detail: THERAPIES SERIES
Discharge: HOME | End: 2022-01-27
Payer: COMMERCIAL

## 2022-01-27 PROCEDURE — 97110 THERAPEUTIC EXERCISES: CPT | Performed by: PHYSICAL THERAPY ASSISTANT

## 2022-01-27 PROCEDURE — 97112 NEUROMUSCULAR REEDUCATION: CPT | Performed by: PHYSICAL THERAPY ASSISTANT

## 2022-01-27 PROCEDURE — 97140 MANUAL THERAPY 1/> REGIONS: CPT | Performed by: PHYSICAL THERAPY ASSISTANT

## 2022-01-27 NOTE — FLOWSHEET NOTE
Formerly [facility], [facility], [address] [facility] [place], [address]  Phone: [phone], [phone]    Physical Therapy Treatment Note/ Progress Report:     Date:  2022    Patient Name:  Elia Fernandez. :  1960  MRN: [id number]  Restrictions/Precautions:    Medical/Treatment Diagnosis Information:  Diagnosis: Left Rotator Cuff Repair on 1/10/22  Treatment Diagnosis: Q87.776  Insurance/Certification information:  PT Insurance Information: [employer]  Physician Information:  Referring Practitioner: Zina Manuel MD  Has the plan of care been signed (Y/N):        []  Yes  [x]  No     Date of Patient follow up with Physician: 3/8/2022    Is this a Progress Report:     []  Yes  [x]  No      If Yes:  Date Range for reporting period:  Initial Eval: 2022  Beginnin2022 --- Endin2022    Progress report will be due (10 Rx or 30 days whichever is less):     Recertification will be due (POC Duration  / 90 days whichever is less): 2022     Visit # Insurance Allowable Auth Required   In Person 2 20 visits []  Yes     []  No    Tele Health   []  Yes     []  No    Total 2         Functional Scale: Quick Dash: 73% (Total Number Sum: 24/46)   Date assessed: 2022     Latex Allergy:  [x]NO      []YES  Preferred Language for Healthcare:   [x]English       []other:    Pain level: 0/10 currently; 8/10 with activity     SUBJECTIVE: (2 weeks PO on 22) Minimal pain reported.     OBJECTIVE: See eval   Observation:    Test measurements:      RESTRICTIONS/PRECAUTIONS: HTN, Hx of heart problems          Exercises/Interventions:   Therapeutic Ex (79699)  Therapeutic Activity (04811)  NMR re-education (74082) Sets/Reps Notes/CUES             Scap squeezes 3 x 10    Shoulder shrugs 3 x 10    Gripping Blue ball x3 min    Wrist AROM 3 x 10 ea    UT stretch 3 x 30\" ea    Elbow flexion AROM x10 ea of 3 way         Standing pendulums 3x10 Flex/ext, clock, counter Table slides 10x10\" ea 3 way                                                           Manual Intervention (02462)     Shoulder PROM- gentle 10' Flexion to 90°, abd to 90°, IR, ER to neutral                               Medbridge access code: Leandra Gum  Access Code: Leandra Ramos  URL: [email or web address]  Date: 01/24/2022  Prepared by: Robin Lopez    Exercises  Seated Scapular Retraction - 1-2 x daily - 7 x weekly - 3 sets - 10 reps - 5 s hold  Seated Shoulder Shrugs - 1-2 x daily - 7 x weekly - 3 sets - 10 reps  Seated Neck Sidebending Stretch - 1-2 x daily - 7 x weekly - 3 reps - 30s hold  Wrist AROM Radial Ulnar Deviation - 1-2 x daily - 7 x weekly - 3 sets - 10 reps  Forearm Strengthening with Ball Squeeze - 1-2 x daily - 7 x weekly - 3 sets - 10 reps  Seated Wrist Extension AROM - 1-2 x daily - 7 x weekly - 3 sets - 10 reps  Wrist Flexion Extension AROM with Fingers Curled and Palm Down - 1-2 x daily - 7 x weekly - 3 sets - 10 reps  Circular Shoulder Pendulum with Table Support - 1-2 x daily - 7 x weekly - 3 sets - 10 reps  Flexion-Extension Shoulder Pendulum with Table Support - 1-2 x daily - 7 x weekly - 3 sets - 10 reps  Seated Elbow Flexion Extension AROM - 1-2 x daily - 7 x weekly - 3 sets - 10 reps                  Patient Education 10'  Pt education with HEP and progression of PT along with compliance with HEP to aide with formal PT for optimal outcomes. Therapeutic Exercise and NMR EXR  [x] (40819) Provided verbal/tactile cueing for activities related to strengthening, flexibility, endurance, ROM  for improvements in scapular, scapulothoracic and UE control with self care, reaching, carrying, lifting, house/yardwork, driving/computer work.     [x] (44668) Provided verbal/tactile cueing for activities related to improving balance, coordination, kinesthetic sense, posture, motor skill, proprioception  to assist with  scapular, scapulothoracic and UE control with self care, reaching, carrying, lifting, house/yardwork, driving/computer work. Therapeutic Activities:    [x] (82572 or 97117) Provided verbal/tactile cueing for activities related to improving balance, coordination, kinesthetic sense, posture, motor skill, proprioception and motor activation to allow for proper function of scapular, scapulothoracic and UE control with self care, carrying, lifting, driving/computer work.      Home Exercise Program:    [x] (35231) Reviewed/Progressed HEP activities related to strengthening, flexibility, endurance, ROM of scapular, scapulothoracic and UE control with self care, reaching, carrying, lifting, house/yardwork, driving/computer work  [x] (86125) Reviewed/Progressed HEP activities related to improving balance, coordination, kinesthetic sense, posture, motor skill, proprioception of scapular, scapulothoracic and UE control with self care, reaching, carrying, lifting, house/yardwork, driving/computer work      Manual Treatments:  PROM / STM / Oscillations-Mobs:  G-I, II, III, IV (PA's, Inf., Post.)  [x] (13878) Provided manual therapy to mobilize soft tissue/joints of cervical/CT, scapular GHJ and UE for the purpose of modulating pain, promoting relaxation,  increasing ROM, reducing/eliminating soft tissue swelling/inflammation/restriction, improving soft tissue extensibility and allowing for proper ROM for normal function with self care, reaching, carrying, lifting, house/yardwork, driving/computer work    Modalities:      Charges:  Timed Code Treatment Minutes: 60'   Total Treatment Minutes:  60'   BWC:  TE TIME:  NMR TIME:  MANUAL TIME:  UNTIMED MINUTES:  Medicare Total:         [] EVAL (LOW) 93483 (typically 20 minutes face-to-face)  [] EVAL (MOD) 17142 (typically 30 minutes face-to-face)  [] EVAL (HIGH) 91032 (typically 45 minutes face-to-face)  [] RE-EVAL     [x] WN(94500) x 2    [] IONTO  [x] NMR (39125) x 1    [] VASO  [x] Manual (40124) x 1    [] Other:  [] TA x      [] Mech Traction (65387)  [] ES(attended) (72235)     [] ES (un) (53495):    ASSESSMENT:  See eval    GOALS:   Short Term Goals: To be achieved in: 2 weeks  1. Independent in HEP and progression per patient tolerance, in order to prevent re-injury. [] Progressing: [] Met: [] Not Met: [] Adjusted   2. Patient will have a decrease in pain to facilitate improvement in movement, function, and ADLs as indicated by Functional Deficits. [] Progressing: [] Met: [] Not Met: [] Adjusted     Long Term Goals: To be achieved in: 12 weeks  1. Disability index score of 20% or less for the QuickDash/Oswestry to assist with reaching prior level of function. [] Progressing: [] Met: [] Not Met: [] Adjusted   2. Patient will demonstrate increased AROM on the left to equal the opposite side bilaterally to allow for ability to reach overhead pain and restriction free as indicated by patients Functional Deficits. [] Progressing: [] Met: [] Not Met: [] Adjusted   3. Patient will demonstrate an increase in strength of the L UE to 4+/5 to allow for ability to perform all functional activities without restriction as indicated by patients Functional Deficits. [] Progressing: [] Met: [] Not Met: [] Adjusted   4. Patient will return to all transfers, work activities, and functional activities without increased symptoms or restriction. [] Progressing: [] Met: [] Not Met: [] Adjusted   5. Patient will have 0/10 pain with ADL's.  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 [] Progressing: [] Met: [] Not Met: [] Adjusted     Overall Progression Towards Functional goals/ Treatment Progress Update:  [] Patient is progressing as expected towards functional goals listed. [] Progression is slowed due to complexities/Impairments listed. [] Progression has been slowed due to co-morbidities.   [x] Plan just implemented, too soon to assess goals progression <30days   [] Goals require adjustment due to lack of progress  [] Patient is not progressing as expected and requires additional follow up with physician  [] Other    Prognosis for POC: [x] Good [] Fair  [] Poor    Patient requires continued skilled intervention: [x] Yes  [] No    Treatment/Activity Tolerance:  [x] Patient able to complete treatment  [] Patient limited by fatigue  [] Patient limited by pain    [] Patient limited by other medical complications  [] Other:     Return to Play: (if applicable)   []  Stage 1: Intro to Strength   []  Stage 2: Return to Run and Strength   []  Stage 3: Return to Jump and Strength   []  Stage 4: Dynamic Strength and Agility   []  Stage 5: Sport Specific Training     []  Ready to Return to Play, Meets All Above Stages   []  Not Ready for Return to Sports   Comments:                         PLAN: See eval  [x] Continue per plan of care [] Alter current plan (see comments above)  [] Plan of care initiated [] Hold pending MD visit [] Discharge    Electronically signed by:   Meghan Acharya PTA    Note: If patient does not return for scheduled/ recommended follow up visits, this note will serve as a discharge from care along with most recent update on progress.

## 2022-01-31 ENCOUNTER — HOSPITAL ENCOUNTER (OUTPATIENT)
Dept: PHYSICAL THERAPY | Age: 62
Setting detail: THERAPIES SERIES
Discharge: HOME | End: 2022-01-31
Payer: COMMERCIAL

## 2022-01-31 PROCEDURE — 97110 THERAPEUTIC EXERCISES: CPT | Performed by: PHYSICAL THERAPY ASSISTANT

## 2022-01-31 PROCEDURE — 97112 NEUROMUSCULAR REEDUCATION: CPT | Performed by: PHYSICAL THERAPY ASSISTANT

## 2022-01-31 PROCEDURE — 97140 MANUAL THERAPY 1/> REGIONS: CPT | Performed by: PHYSICAL THERAPY ASSISTANT

## 2022-01-31 NOTE — FLOWSHEET NOTE
[facility], [address] [place] [address] [place], [address]  Phone: [phone], [phone]    Physical Therapy Treatment Note/ Progress Report:     Date:  2022    Patient Name:  Nancy Leyva. :  1960  MRN: [id number]  Restrictions/Precautions:    Medical/Treatment Diagnosis Information:  Diagnosis: Left Rotator Cuff Repair on 1/10/22  Treatment Diagnosis: U45.544  Insurance/Certification information:  PT Insurance Information: [employer]  Physician Information:  Referring Practitioner: Suzan Snell MD  Has the plan of care been signed (Y/N):        []  Yes  [x]  No     Date of Patient follow up with Physician: 3/8/2022    Is this a Progress Report:     []  Yes  [x]  No      If Yes:  Date Range for reporting period:  Initial Eval: 2022  Beginnin2022 --- Endin2022    Progress report will be due (10 Rx or 30 days whichever is less):     Recertification will be due (POC Duration  / 90 days whichever is less): 2022     Visit # Insurance Allowable Auth Required   In Person 3 20 visits []  Yes     []  No    Tele Health   []  Yes     []  No    Total 3         Functional Scale: Quick Dash: 73% (Total Number Sum: 55/90)   Date assessed: 2022     Latex Allergy:  [x]NO      []YES  Preferred Language for Healthcare:   [x]English       []other:    Pain level: 0/10 currently; 8/10 with activity     SUBJECTIVE: (3 weeks PO on 22) Patient reports that he felt great Friday and Saturday but has been having a lot more pain after performing HEP yesterday - does admit that he pushed himself more during the exercise but also has been lifting glasses and donning/doffing clothing.  (Patient counseled on passive phase, use of sling, and that he should not be attempting to actively  or raise the arm)     OBJECTIVE: See eval   Observation:    Test measurements:      RESTRICTIONS/PRECAUTIONS: HTN, Hx of heart problems          Exercises/Interventions:   Therapeutic Ex (46001)  Therapeutic Activity (97120)  NMR re-education (57873) Sets/Reps Notes/CUES             Scap squeezes 3 x 10    Shoulder shrugs 3 x 10    Gripping Blue ball x3 min    Wrist AROM 3 x 10 ea    UT stretch 3 x 30\" ea    Elbow flexion AROM x10 ea of 3 way         Standing pendulums 3x10 Flex/ext, clock, counter             Table slides 10x10\" ea 3 way                                                           Manual Intervention (39965)     Shoulder PROM- gentle 10' Flexion to 90°, abd to 90°, IR, ER to neutral                               Medbridge access code: Nataliya Mccoyyoselyn  Access Code: Nataliya Bennett  URL: [email or web address]  Date: 01/24/2022  Prepared by: Breanne Lockwood    Exercises  Seated Scapular Retraction - 1-2 x daily - 7 x weekly - 3 sets - 10 reps - 5 s hold  Seated Shoulder Shrugs - 1-2 x daily - 7 x weekly - 3 sets - 10 reps  Seated Neck Sidebending Stretch - 1-2 x daily - 7 x weekly - 3 reps - 30s hold  Wrist AROM Radial Ulnar Deviation - 1-2 x daily - 7 x weekly - 3 sets - 10 reps  Forearm Strengthening with Ball Squeeze - 1-2 x daily - 7 x weekly - 3 sets - 10 reps  Seated Wrist Extension AROM - 1-2 x daily - 7 x weekly - 3 sets - 10 reps  Wrist Flexion Extension AROM with Fingers Curled and Palm Down - 1-2 x daily - 7 x weekly - 3 sets - 10 reps  Circular Shoulder Pendulum with Table Support - 1-2 x daily - 7 x weekly - 3 sets - 10 reps  Flexion-Extension Shoulder Pendulum with Table Support - 1-2 x daily - 7 x weekly - 3 sets - 10 reps  Seated Elbow Flexion Extension AROM - 1-2 x daily - 7 x weekly - 3 sets - 10 reps                  Patient Education 10'  Pt education with HEP and progression of PT along with compliance with HEP to aide with formal PT for optimal outcomes.       Therapeutic Exercise and NMR EXR  [x] (96070) Provided verbal/tactile cueing for activities related to strengthening, flexibility, endurance, ROM  for improvements in scapular, scapulothoracic and UE control with self care, reaching, carrying, lifting, house/yardwork, driving/computer work. [x] (35824) Provided verbal/tactile cueing for activities related to improving balance, coordination, kinesthetic sense, posture, motor skill, proprioception  to assist with  scapular, scapulothoracic and UE control with self care, reaching, carrying, lifting, house/yardwork, driving/computer work. Therapeutic Activities:    [x] (61337 or 01443) Provided verbal/tactile cueing for activities related to improving balance, coordination, kinesthetic sense, posture, motor skill, proprioception and motor activation to allow for proper function of scapular, scapulothoracic and UE control with self care, carrying, lifting, driving/computer work.      Home Exercise Program:    [x] (47631) Reviewed/Progressed HEP activities related to strengthening, flexibility, endurance, ROM of scapular, scapulothoracic and UE control with self care, reaching, carrying, lifting, house/yardwork, driving/computer work  [x] (65350) Reviewed/Progressed HEP activities related to improving balance, coordination, kinesthetic sense, posture, motor skill, proprioception of scapular, scapulothoracic and UE control with self care, reaching, carrying, lifting, house/yardwork, driving/computer work      Manual Treatments:  PROM / STM / Oscillations-Mobs:  G-I, II, III, IV (PA's, Inf., Post.)  [x] (88900) Provided manual therapy to mobilize soft tissue/joints of cervical/CT, scapular GHJ and UE for the purpose of modulating pain, promoting relaxation,  increasing ROM, reducing/eliminating soft tissue swelling/inflammation/restriction, improving soft tissue extensibility and allowing for proper ROM for normal function with self care, reaching, carrying, lifting, house/yardwork, driving/computer work    Modalities:      Charges:  Timed Code Treatment Minutes: 61'   Total Treatment Minutes:  61'   BWC:  MARILY TIME:  NMR TIME:  MANUAL TIME:  UNTIMED MINUTES:  Medicare Total:         [] EVAL (LOW) 30123 (typically 20 minutes face-to-face)  [] EVAL (MOD) 76276 (typically 30 minutes face-to-face)  [] EVAL (HIGH) 22646 (typically 45 minutes face-to-face)  [] RE-EVAL     [x] EZ(97703) x 2    [] IONTO  [x] NMR (00885) x 1    [] VASO  [x] Manual (45981) x 1    [] Other:  [] TA x      [] Mech Traction (74629)  [] ES(attended) (40691)     [] ES (un) (23301):    ASSESSMENT:  See eval    GOALS:   Short Term Goals: To be achieved in: 2 weeks  1. Independent in HEP and progression per patient tolerance, in order to prevent re-injury. [] Progressing: [] Met: [] Not Met: [] Adjusted   2. Patient will have a decrease in pain to facilitate improvement in movement, function, and ADLs as indicated by Functional Deficits. [] Progressing: [] Met: [] Not Met: [] Adjusted     Long Term Goals: To be achieved in: 12 weeks  1. Disability index score of 20% or less for the QuickDash/Oswestry to assist with reaching prior level of function. [] Progressing: [] Met: [] Not Met: [] Adjusted   2. Patient will demonstrate increased AROM on the left to equal the opposite side bilaterally to allow for ability to reach overhead pain and restriction free as indicated by patients Functional Deficits. [] Progressing: [] Met: [] Not Met: [] Adjusted   3. Patient will demonstrate an increase in strength of the L UE to 4+/5 to allow for ability to perform all functional activities without restriction as indicated by patients Functional Deficits. [] Progressing: [] Met: [] Not Met: [] Adjusted   4. Patient will return to all transfers, work activities, and functional activities without increased symptoms or restriction. [] Progressing: [] Met: [] Not Met: [] Adjusted   5. Patient will have 0/10 pain with ADL's.  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 [] Progressing: [] Met: [] Not Met: [] Adjusted     Overall Progression Towards Functional goals/ Treatment Progress Update:  [] Patient is progressing as expected towards functional goals listed. [] Progression is slowed due to complexities/Impairments listed. [] Progression has been slowed due to co-morbidities. [x] Plan just implemented, too soon to assess goals progression <30days   [] Goals require adjustment due to lack of progress  [] Patient is not progressing as expected and requires additional follow up with physician  [] Other    Prognosis for POC: [x] Good [] Fair  [] Poor    Patient requires continued skilled intervention: [x] Yes  [] No    Treatment/Activity Tolerance:  [x] Patient able to complete treatment  [] Patient limited by fatigue  [] Patient limited by pain    [] Patient limited by other medical complications  [] Other:     Return to Play: (if applicable)   []  Stage 1: Intro to Strength   []  Stage 2: Return to Run and Strength   []  Stage 3: Return to Jump and Strength   []  Stage 4: Dynamic Strength and Agility   []  Stage 5: Sport Specific Training     []  Ready to Return to Play, Meets All Above Stages   []  Not Ready for Return to Sports   Comments:                         PLAN: See eval  [x] Continue per plan of care [] Alter current plan (see comments above)  [] Plan of care initiated [] Hold pending MD visit [] Discharge    Electronically signed by:   Taran Smith PTA    Note: If patient does not return for scheduled/ recommended follow up visits, this note will serve as a discharge from care along with most recent update on progress.

## 2022-02-01 ENCOUNTER — APPOINTMENT (OUTPATIENT)
Dept: PHYSICAL THERAPY | Age: 62
End: 2022-02-01
Payer: COMMERCIAL

## 2022-02-02 ENCOUNTER — HOSPITAL ENCOUNTER (OUTPATIENT)
Dept: PHYSICAL THERAPY | Age: 62
Setting detail: THERAPIES SERIES
Discharge: HOME | End: 2022-02-02
Payer: COMMERCIAL

## 2022-02-02 PROCEDURE — 97110 THERAPEUTIC EXERCISES: CPT | Performed by: PHYSICAL THERAPY ASSISTANT

## 2022-02-02 PROCEDURE — 97112 NEUROMUSCULAR REEDUCATION: CPT | Performed by: PHYSICAL THERAPY ASSISTANT

## 2022-02-02 PROCEDURE — 97140 MANUAL THERAPY 1/> REGIONS: CPT | Performed by: PHYSICAL THERAPY ASSISTANT

## 2022-02-02 NOTE — FLOWSHEET NOTE
[facility], [address] [place] [address] Cynthia Bangura, [address]  Phone: [phone], [phone]    Physical Therapy Treatment Note/ Progress Report:     Date:  2022    Patient Name:  Adis Helton :  1960  MRN: [id number]  Restrictions/Precautions:    Medical/Treatment Diagnosis Information:  Diagnosis: Left Rotator Cuff Repair on 1/10/22  Treatment Diagnosis: H36.793  Insurance/Certification information:  PT Insurance Information: [employer]  Physician Information:  Referring Practitioner: Matt Loo MD  Has the plan of care been signed (Y/N):        []  Yes  [x]  No     Date of Patient follow up with Physician: 3/8/2022    Is this a Progress Report:     []  Yes  [x]  No      If Yes:  Date Range for reporting period:  Initial Eval: 2022  Beginnin2022 --- Endin2022    Progress report will be due (10 Rx or 30 days whichever is less):     Recertification will be due (POC Duration  / 90 days whichever is less): 2022     Visit # Insurance Allowable Auth Required   In Person 4 20 visits []  Yes     []  No    Tele Health   []  Yes     []  No    Total 4         Functional Scale: Quick Dash: 73% (Total Number Sum: 10/53)   Date assessed: 2022     Latex Allergy:  [x]NO      []YES  Preferred Language for Healthcare:   [x]English       []other:    Pain level: 0/10 currently; 8/10 with activity     SUBJECTIVE: (3 weeks PO on 22) Patient reports that he was pretty sore on Tuesday but seems to be doing better today.       OBJECTIVE: See eval   Observation:    Test measurements:      RESTRICTIONS/PRECAUTIONS: HTN, Hx of heart problems          Exercises/Interventions:   Therapeutic Ex (60804)  Therapeutic Activity (11870)  NMR re-education (76633) Sets/Reps Notes/CUES                            Scap squeezes 3 x 10    Shoulder shrugs 3 x 10    Gripping Blue ball x3 min    Wrist AROM 3 x 10 ea With ball   UT stretch 3 x 30\" ea    Elbow flexion AROM x10 ea of 3 way         Standing pendulums 3x10 Flex/ext, clock, counter             Table slides 10x10\" ea 3 way                                                           Manual Intervention (27636)     Shoulder PROM- gentle 10' Flexion to 90°, abd to 90°, IR, ER to neutral                               Medbridge access code: Tae Sneha  Access Code: Tae Hoover  URL: [email or web address]  Date: 01/24/2022  Prepared by: Elizabeth Menendez    Exercises  Seated Scapular Retraction - 1-2 x daily - 7 x weekly - 3 sets - 10 reps - 5 s hold  Seated Shoulder Shrugs - 1-2 x daily - 7 x weekly - 3 sets - 10 reps  Seated Neck Sidebending Stretch - 1-2 x daily - 7 x weekly - 3 reps - 30s hold  Wrist AROM Radial Ulnar Deviation - 1-2 x daily - 7 x weekly - 3 sets - 10 reps  Forearm Strengthening with Ball Squeeze - 1-2 x daily - 7 x weekly - 3 sets - 10 reps  Seated Wrist Extension AROM - 1-2 x daily - 7 x weekly - 3 sets - 10 reps  Wrist Flexion Extension AROM with Fingers Curled and Palm Down - 1-2 x daily - 7 x weekly - 3 sets - 10 reps  Circular Shoulder Pendulum with Table Support - 1-2 x daily - 7 x weekly - 3 sets - 10 reps  Flexion-Extension Shoulder Pendulum with Table Support - 1-2 x daily - 7 x weekly - 3 sets - 10 reps  Seated Elbow Flexion Extension AROM - 1-2 x daily - 7 x weekly - 3 sets - 10 reps                  Patient Education 10'  Pt education with HEP and progression of PT along with compliance with HEP to aide with formal PT for optimal outcomes. Therapeutic Exercise and NMR EXR  [x] (21700) Provided verbal/tactile cueing for activities related to strengthening, flexibility, endurance, ROM  for improvements in scapular, scapulothoracic and UE control with self care, reaching, carrying, lifting, house/yardwork, driving/computer work.     [x] (67135) Provided verbal/tactile cueing for activities related to improving balance, coordination, kinesthetic sense, posture, motor skill, proprioception  to assist with  scapular, scapulothoracic and UE control with self care, reaching, carrying, lifting, house/yardwork, driving/computer work. Therapeutic Activities:    [x] (15060 or 88018) Provided verbal/tactile cueing for activities related to improving balance, coordination, kinesthetic sense, posture, motor skill, proprioception and motor activation to allow for proper function of scapular, scapulothoracic and UE control with self care, carrying, lifting, driving/computer work.      Home Exercise Program:    [x] (47998) Reviewed/Progressed HEP activities related to strengthening, flexibility, endurance, ROM of scapular, scapulothoracic and UE control with self care, reaching, carrying, lifting, house/yardwork, driving/computer work  [x] (90281) Reviewed/Progressed HEP activities related to improving balance, coordination, kinesthetic sense, posture, motor skill, proprioception of scapular, scapulothoracic and UE control with self care, reaching, carrying, lifting, house/yardwork, driving/computer work      Manual Treatments:  PROM / STM / Oscillations-Mobs:  G-I, II, III, IV (PA's, Inf., Post.)  [x] (21464) Provided manual therapy to mobilize soft tissue/joints of cervical/CT, scapular GHJ and UE for the purpose of modulating pain, promoting relaxation,  increasing ROM, reducing/eliminating soft tissue swelling/inflammation/restriction, improving soft tissue extensibility and allowing for proper ROM for normal function with self care, reaching, carrying, lifting, house/yardwork, driving/computer work    Modalities:      Charges:  Timed Code Treatment Minutes: 60'   Total Treatment Minutes:  60'   BWC:  TE TIME:  NMR TIME:  MANUAL TIME:  UNTIMED MINUTES:  Medicare Total:         [] EVAL (LOW) 27163 (typically 20 minutes face-to-face)  [] EVAL (MOD) 57102 (typically 30 minutes face-to-face)  [] EVAL (HIGH) 39519 (typically 45 minutes face-to-face)  [] RE-EVAL     [x] SADIQ(42910) x 2    [] IONTO  [x] NMR (42023) x 1    [] VASO  [x] Manual (21924) x 1    [] Other:  [] TA x      [] Mech Traction (90047)  [] ES(attended) (80373)     [] ES (un) (73159):    ASSESSMENT:  See eval    GOALS:   Short Term Goals: To be achieved in: 2 weeks  1. Independent in HEP and progression per patient tolerance, in order to prevent re-injury. [] Progressing: [] Met: [] Not Met: [] Adjusted   2. Patient will have a decrease in pain to facilitate improvement in movement, function, and ADLs as indicated by Functional Deficits. [] Progressing: [] Met: [] Not Met: [] Adjusted     Long Term Goals: To be achieved in: 12 weeks  1. Disability index score of 20% or less for the QuickDash/Oswestry to assist with reaching prior level of function. [] Progressing: [] Met: [] Not Met: [] Adjusted   2. Patient will demonstrate increased AROM on the left to equal the opposite side bilaterally to allow for ability to reach overhead pain and restriction free as indicated by patients Functional Deficits. [] Progressing: [] Met: [] Not Met: [] Adjusted   3. Patient will demonstrate an increase in strength of the L UE to 4+/5 to allow for ability to perform all functional activities without restriction as indicated by patients Functional Deficits. [] Progressing: [] Met: [] Not Met: [] Adjusted   4. Patient will return to all transfers, work activities, and functional activities without increased symptoms or restriction. [] Progressing: [] Met: [] Not Met: [] Adjusted   5. Patient will have 0/10 pain with ADL's.  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 [] Progressing: [] Met: [] Not Met: [] Adjusted     Overall Progression Towards Functional goals/ Treatment Progress Update:  [] Patient is progressing as expected towards functional goals listed. [] Progression is slowed due to complexities/Impairments listed.   [] Progression has been slowed due to co-morbidities. [x] Plan just implemented, too soon to assess goals progression <30days   [] Goals require adjustment due to lack of progress  [] Patient is not progressing as expected and requires additional follow up with physician  [] Other    Prognosis for POC: [x] Good [] Fair  [] Poor    Patient requires continued skilled intervention: [x] Yes  [] No    Treatment/Activity Tolerance:  [x] Patient able to complete treatment  [] Patient limited by fatigue  [] Patient limited by pain    [] Patient limited by other medical complications  [] Other:     Return to Play: (if applicable)   []  Stage 1: Intro to Strength   []  Stage 2: Return to Run and Strength   []  Stage 3: Return to Jump and Strength   []  Stage 4: Dynamic Strength and Agility   []  Stage 5: Sport Specific Training     []  Ready to Return to Play, Meets All Above Stages   []  Not Ready for Return to Sports   Comments:                         PLAN: See eval  [x] Continue per plan of care [] Alter current plan (see comments above)  [] Plan of care initiated [] Hold pending MD visit [] Discharge    Electronically signed by:   Shelley Dennis PTA    Note: If patient does not return for scheduled/ recommended follow up visits, this note will serve as a discharge from care along with most recent update on progress.

## 2022-02-03 ENCOUNTER — HOSPITAL ENCOUNTER (OUTPATIENT)
Dept: PHYSICAL THERAPY | Age: 62
Setting detail: THERAPIES SERIES
End: 2022-02-03
Payer: COMMERCIAL

## 2022-02-07 ENCOUNTER — HOSPITAL ENCOUNTER (OUTPATIENT)
Dept: PHYSICAL THERAPY | Age: 62
Setting detail: THERAPIES SERIES
Discharge: HOME | End: 2022-02-07
Payer: COMMERCIAL

## 2022-02-07 PROCEDURE — 97140 MANUAL THERAPY 1/> REGIONS: CPT | Performed by: PHYSICAL THERAPY ASSISTANT

## 2022-02-07 PROCEDURE — 97110 THERAPEUTIC EXERCISES: CPT | Performed by: PHYSICAL THERAPY ASSISTANT

## 2022-02-07 PROCEDURE — 97112 NEUROMUSCULAR REEDUCATION: CPT | Performed by: PHYSICAL THERAPY ASSISTANT

## 2022-02-07 NOTE — FLOWSHEET NOTE
[facility], [address] [place] [address] Dorina Banguraus, [facility]  Phone: [phone], [phone]    Physical Therapy Treatment Note/ Progress Report:     Date:  2022    Patient Name:  Beth Jose. :  1960  MRN: [id number]  Restrictions/Precautions:    Medical/Treatment Diagnosis Information:  Diagnosis: Left Rotator Cuff Repair on 1/10/22  Treatment Diagnosis: W51.075  Insurance/Certification information:  PT Insurance Information: [employer]  Physician Information:  Referring Practitioner: Spencer Enrique MD  Has the plan of care been signed (Y/N):        []  Yes  [x]  No     Date of Patient follow up with Physician: 3/8/2022    Is this a Progress Report:     []  Yes  [x]  No      If Yes:  Date Range for reporting period:  Initial Eval: 2022  Beginnin2022 --- Endin2022    Progress report will be due (10 Rx or 30 days whichever is less):     Recertification will be due (POC Duration  / 90 days whichever is less): 2022     Visit # Insurance Allowable Auth Required   In Person 5 24 visits 22-22 []  Yes     []  No    [facility]   []  Yes     []  No    Total 5         Functional Scale: Quick Dash: 73% (Total Number Sum: )   Date assessed: 2022     Latex Allergy:  [x]NO      []YES  Preferred Language for Healthcare:   [x]English       []other:    Pain level: 0/10 currently     SUBJECTIVE: (4 weeks PO on 22) Doing well. Sleeping well in a chair. No trouble with HEP.       OBJECTIVE: See eval   Observation:    Test measurements:      RESTRICTIONS/PRECAUTIONS: HTN, Hx of heart problems          Exercises/Interventions:   Therapeutic Ex (78628)  Therapeutic Activity (98157)  NMR re-education (58009) Sets/Reps Notes/CUES                            Scap squeezes 3 x 10    Shoulder shrugs 3 x 10    Gripping Blue ball x3 min    Wrist AROM 3 x 10 ea With ball   UT stretch 3 x 30\" ea    Elbow flexion AROM x10 ea of 3 way Standing pendulums 3x10 Flex/ext, clock, counter             Table slides 10x10\" ea 3 way                                                           Manual Intervention (86024)     Shoulder PROM- gentle 10' Flexion to 90°, abd to 90°, IR, ER to neutral                               Medbridge access code: Andressa Briggs  Access Code: Andressa Briggs  URL: [email or web address]  Date: 01/24/2022  Prepared by: Mike Mcdonald    Exercises  Seated Scapular Retraction - 1-2 x daily - 7 x weekly - 3 sets - 10 reps - 5 s hold  Seated Shoulder Shrugs - 1-2 x daily - 7 x weekly - 3 sets - 10 reps  Seated Neck Sidebending Stretch - 1-2 x daily - 7 x weekly - 3 reps - 30s hold  Wrist AROM Radial Ulnar Deviation - 1-2 x daily - 7 x weekly - 3 sets - 10 reps  Forearm Strengthening with Ball Squeeze - 1-2 x daily - 7 x weekly - 3 sets - 10 reps  Seated Wrist Extension AROM - 1-2 x daily - 7 x weekly - 3 sets - 10 reps  Wrist Flexion Extension AROM with Fingers Curled and Palm Down - 1-2 x daily - 7 x weekly - 3 sets - 10 reps  Circular Shoulder Pendulum with Table Support - 1-2 x daily - 7 x weekly - 3 sets - 10 reps  Flexion-Extension Shoulder Pendulum with Table Support - 1-2 x daily - 7 x weekly - 3 sets - 10 reps  Seated Elbow Flexion Extension AROM - 1-2 x daily - 7 x weekly - 3 sets - 10 reps                  Patient Education 10'  Pt education with HEP and progression of PT along with compliance with HEP to aide with formal PT for optimal outcomes. Therapeutic Exercise and NMR EXR  [x] (82736) Provided verbal/tactile cueing for activities related to strengthening, flexibility, endurance, ROM  for improvements in scapular, scapulothoracic and UE control with self care, reaching, carrying, lifting, house/yardwork, driving/computer work.     [x] (98323) Provided verbal/tactile cueing for activities related to improving balance, coordination, kinesthetic sense, posture, motor skill, proprioception  to assist with  scapular, scapulothoracic and UE control with self care, reaching, carrying, lifting, house/yardwork, driving/computer work. Therapeutic Activities:    [x] (12089 or 59389) Provided verbal/tactile cueing for activities related to improving balance, coordination, kinesthetic sense, posture, motor skill, proprioception and motor activation to allow for proper function of scapular, scapulothoracic and UE control with self care, carrying, lifting, driving/computer work.      Home Exercise Program:    [x] (98080) Reviewed/Progressed HEP activities related to strengthening, flexibility, endurance, ROM of scapular, scapulothoracic and UE control with self care, reaching, carrying, lifting, house/yardwork, driving/computer work  [x] (20906) Reviewed/Progressed HEP activities related to improving balance, coordination, kinesthetic sense, posture, motor skill, proprioception of scapular, scapulothoracic and UE control with self care, reaching, carrying, lifting, house/yardwork, driving/computer work      Manual Treatments:  PROM / STM / Oscillations-Mobs:  G-I, II, III, IV (PA's, Inf., Post.)  [x] (09418) Provided manual therapy to mobilize soft tissue/joints of cervical/CT, scapular GHJ and UE for the purpose of modulating pain, promoting relaxation,  increasing ROM, reducing/eliminating soft tissue swelling/inflammation/restriction, improving soft tissue extensibility and allowing for proper ROM for normal function with self care, reaching, carrying, lifting, house/yardwork, driving/computer work    Modalities:      Charges:  Timed Code Treatment Minutes: 39'   Total Treatment Minutes:  45'   BWC:  TE TIME:  NMR TIME:  MANUAL TIME:  UNTIMED MINUTES:  Medicare Total:         [] EVAL (LOW) 20217 (typically 20 minutes face-to-face)  [] EVAL (MOD) 47460 (typically 30 minutes face-to-face)  [] EVAL (HIGH) 46608 (typically 45 minutes face-to-face)  [] RE-EVAL     [x] DX(98076) x 1    [] IONTO  [x] NMR (18560) x 1    [] VASO  [x] Manual ([id number]) x 1    [] Other:  [] TA x      [] Mech Traction (76290)  [] ES(attended) (02629)     [] ES (un) (14902):    ASSESSMENT:  See eval    GOALS:   Short Term Goals: To be achieved in: 2 weeks  1. Independent in HEP and progression per patient tolerance, in order to prevent re-injury. [] Progressing: [x] Met: [] Not Met: [] Adjusted   2. Patient will have a decrease in pain to facilitate improvement in movement, function, and ADLs as indicated by Functional Deficits. [] Progressing: [x] Met: [] Not Met: [] Adjusted     Long Term Goals: To be achieved in: 12 weeks  1. Disability index score of 20% or less for the QuickDash/Oswestry to assist with reaching prior level of function. [x] Progressing: [] Met: [] Not Met: [] Adjusted   2. Patient will demonstrate increased AROM on the left to equal the opposite side bilaterally to allow for ability to reach overhead pain and restriction free as indicated by patients Functional Deficits. [x] Progressing: [] Met: [] Not Met: [] Adjusted   3. Patient will demonstrate an increase in strength of the L UE to 4+/5 to allow for ability to perform all functional activities without restriction as indicated by patients Functional Deficits. [x] Progressing: [] Met: [] Not Met: [] Adjusted   4. Patient will return to all transfers, work activities, and functional activities without increased symptoms or restriction. [x] Progressing: [] Met: [] Not Met: [] Adjusted   5. Patient will have 0/10 pain with ADL's. 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 [x] Progressing: [] Met: [] Not Met: [] Adjusted     Overall Progression Towards Functional goals/ Treatment Progress Update:  [x] Patient is progressing as expected towards functional goals listed. [] Progression is slowed due to complexities/Impairments listed. [] Progression has been slowed due to co-morbidities.   [] Plan just implemented, too soon to assess goals progression <30days   [] Goals require adjustment due to lack of progress  [] Patient is not progressing as expected and requires additional follow up with physician  [] Other    Prognosis for POC: [x] Good [] Fair  [] Poor    Patient requires continued skilled intervention: [x] Yes  [] No    Treatment/Activity Tolerance:  [x] Patient able to complete treatment  [] Patient limited by fatigue  [] Patient limited by pain    [] Patient limited by other medical complications  [] Other:     Return to Play: (if applicable)   []  Stage 1: Intro to Strength   []  Stage 2: Return to Run and Strength   []  Stage 3: Return to Jump and Strength   []  Stage 4: Dynamic Strength and Agility   []  Stage 5: Sport Specific Training     []  Ready to Return to Play, Meets All Above Stages   []  Not Ready for Return to Sports   Comments:                         PLAN: See eval  [x] Continue per plan of care [] Alter current plan (see comments above)  [] Plan of care initiated [] Hold pending MD visit [] Discharge    Electronically signed by:   Charlotte Palma PTA    Note: If patient does not return for scheduled/ recommended follow up visits, this note will serve as a discharge from care along with most recent update on progress.

## 2022-02-08 ENCOUNTER — APPOINTMENT (OUTPATIENT)
Dept: PHYSICAL THERAPY | Age: 62
End: 2022-02-08
Payer: COMMERCIAL

## 2022-02-10 ENCOUNTER — HOSPITAL ENCOUNTER (OUTPATIENT)
Dept: PHYSICAL THERAPY | Age: 62
Setting detail: THERAPIES SERIES
Discharge: HOME | End: 2022-02-10
Payer: COMMERCIAL

## 2022-02-10 PROCEDURE — 97112 NEUROMUSCULAR REEDUCATION: CPT | Performed by: PHYSICAL THERAPY ASSISTANT

## 2022-02-10 PROCEDURE — 97110 THERAPEUTIC EXERCISES: CPT | Performed by: PHYSICAL THERAPY ASSISTANT

## 2022-02-10 PROCEDURE — 97140 MANUAL THERAPY 1/> REGIONS: CPT | Performed by: PHYSICAL THERAPY ASSISTANT

## 2022-02-10 NOTE — FLOWSHEET NOTE
[facility], [address] [place], [address]  Phone: [phone], [phone]    Physical Therapy Treatment Note/ Progress Report:     Date:  2/10/2022    Patient Name:  Juanito Rod. :  1960  MRN: [id number]  Restrictions/Precautions:    Medical/Treatment Diagnosis Information:  Diagnosis: Left Rotator Cuff Repair on 1/10/22  Treatment Diagnosis: I41.595  Insurance/Certification information:  PT Insurance Information: [employer]  Physician Information:  Referring Practitioner: Christi Chauhan MD  Has the plan of care been signed (Y/N):        []  Yes  [x]  No     Date of Patient follow up with Physician: 3/8/2022    Is this a Progress Report:     []  Yes  [x]  No      If Yes:  Date Range for reporting period:  Initial Eval: 2022  Beginnin2022 --- Endin2022    Progress report will be due (10 Rx or 30 days whichever is less): 7934    Recertification will be due (POC Duration  / 90 days whichever is less): 2022     Visit # Insurance Allowable Auth Required   In Person 7 24 visits 22-22 []  Yes     []  No    [facility]   []  Yes     []  No    Total 7         Functional Scale: Quick Dash: 73% (Total Number Sum: 97/60)   Date assessed: 2022     Latex Allergy:  [x]NO      []YES  Preferred Language for Healthcare:   [x]English       []other:    Pain level: 0/10 currently     SUBJECTIVE: (4 weeks PO on 22) Patient states he is feeling better - less pain, less swelling, and has increased mobility with table slides.     OBJECTIVE: See eval   Observation:    Test measurements:      RESTRICTIONS/PRECAUTIONS: HTN, Hx of heart problems          Exercises/Interventions:   Therapeutic Ex (86801)  Therapeutic Activity (44676)  NMR re-education (60008) Sets/Reps Notes/CUES   Pulley 5 min                        Scap squeezes 3 x 10    Shoulder shrugs 3 x 10    Gripping Blue ball x3 min    Wrist AROM 3 x 10 ea With ball   UT stretch 3 x 30\" ea    Elbow flexion AROM x20 ea of 3 way         Standing pendulums 3x10 Flex/ext, clock, counter             Table slides 10 x 10\" ea 3 way    Swiss ball flexion 5\" x20         Cane press x20    Cane flexion 10x10\"    Cane ER 5\" x10                                  Manual Intervention (70863)     Shoulder PROM- gentle 10' Flexion to 90°, abd to 90°, IR, ER to neutral                               Medbridge access code: Tysonyvonne Cheadle  Access Code: Dajuan Giraldodavid  URL: [email or web address]  Date: 01/24/2022  Prepared by: Eusebio Waggoner    Exercises  Seated Scapular Retraction - 1-2 x daily - 7 x weekly - 3 sets - 10 reps - 5 s hold  Seated Shoulder Shrugs - 1-2 x daily - 7 x weekly - 3 sets - 10 reps  Seated Neck Sidebending Stretch - 1-2 x daily - 7 x weekly - 3 reps - 30s hold  Wrist AROM Radial Ulnar Deviation - 1-2 x daily - 7 x weekly - 3 sets - 10 reps  Forearm Strengthening with Ball Squeeze - 1-2 x daily - 7 x weekly - 3 sets - 10 reps  Seated Wrist Extension AROM - 1-2 x daily - 7 x weekly - 3 sets - 10 reps  Wrist Flexion Extension AROM with Fingers Curled and Palm Down - 1-2 x daily - 7 x weekly - 3 sets - 10 reps  Circular Shoulder Pendulum with Table Support - 1-2 x daily - 7 x weekly - 3 sets - 10 reps  Flexion-Extension Shoulder Pendulum with Table Support - 1-2 x daily - 7 x weekly - 3 sets - 10 reps  Seated Elbow Flexion Extension AROM - 1-2 x daily - 7 x weekly - 3 sets - 10 reps                  Patient Education 10'  Pt education with HEP and progression of PT along with compliance with HEP to aide with formal PT for optimal outcomes. Therapeutic Exercise and NMR EXR  [x] (32196) Provided verbal/tactile cueing for activities related to strengthening, flexibility, endurance, ROM  for improvements in scapular, scapulothoracic and UE control with self care, reaching, carrying, lifting, house/yardwork, driving/computer work.     [x] (22652) Provided verbal/tactile cueing for activities related to improving balance, coordination, kinesthetic sense, posture, motor skill, proprioception  to assist with  scapular, scapulothoracic and UE control with self care, reaching, carrying, lifting, house/yardwork, driving/computer work. Therapeutic Activities:    [x] (73344 or 01478) Provided verbal/tactile cueing for activities related to improving balance, coordination, kinesthetic sense, posture, motor skill, proprioception and motor activation to allow for proper function of scapular, scapulothoracic and UE control with self care, carrying, lifting, driving/computer work.      Home Exercise Program:    [x] (19955) Reviewed/Progressed HEP activities related to strengthening, flexibility, endurance, ROM of scapular, scapulothoracic and UE control with self care, reaching, carrying, lifting, house/yardwork, driving/computer work  [x] (89345) Reviewed/Progressed HEP activities related to improving balance, coordination, kinesthetic sense, posture, motor skill, proprioception of scapular, scapulothoracic and UE control with self care, reaching, carrying, lifting, house/yardwork, driving/computer work      Manual Treatments:  PROM / STM / Oscillations-Mobs:  G-I, II, III, IV (PA's, Inf., Post.)  [x] (79031) Provided manual therapy to mobilize soft tissue/joints of cervical/CT, scapular GHJ and UE for the purpose of modulating pain, promoting relaxation,  increasing ROM, reducing/eliminating soft tissue swelling/inflammation/restriction, improving soft tissue extensibility and allowing for proper ROM for normal function with self care, reaching, carrying, lifting, house/yardwork, driving/computer work    Modalities:      Charges:  Timed Code Treatment Minutes: 60'   Total Treatment Minutes:  60'   BWC:  TE TIME:  NMR TIME:  MANUAL TIME:  UNTIMED MINUTES:  Medicare Total:         [] EVAL (LOW) 75894 (typically 20 minutes face-to-face)  [] EVAL (MOD) 02450 (typically 30 minutes face-to-face)  [] EVAL (HIGH) 98293 (typically 45 minutes face-to-face)  [] RE-EVAL     [x] DI(84289) x 2    [] IONTO  [x] NMR (66642) x 1    [] VASO  [x] Manual (48904) x 1    [] Other:  [] TA x      [] Mech Traction (85802)  [] ES(attended) (00421)     [] ES (un) (78134):    ASSESSMENT:  See eval    GOALS:   Short Term Goals: To be achieved in: 2 weeks  1. Independent in HEP and progression per patient tolerance, in order to prevent re-injury. [] Progressing: [x] Met: [] Not Met: [] Adjusted   2. Patient will have a decrease in pain to facilitate improvement in movement, function, and ADLs as indicated by Functional Deficits. [] Progressing: [x] Met: [] Not Met: [] Adjusted     Long Term Goals: To be achieved in: 12 weeks  1. Disability index score of 20% or less for the QuickDash/Oswestry to assist with reaching prior level of function. [x] Progressing: [] Met: [] Not Met: [] Adjusted   2. Patient will demonstrate increased AROM on the left to equal the opposite side bilaterally to allow for ability to reach overhead pain and restriction free as indicated by patients Functional Deficits. [x] Progressing: [] Met: [] Not Met: [] Adjusted   3. Patient will demonstrate an increase in strength of the L UE to 4+/5 to allow for ability to perform all functional activities without restriction as indicated by patients Functional Deficits. [x] Progressing: [] Met: [] Not Met: [] Adjusted   4. Patient will return to all transfers, work activities, and functional activities without increased symptoms or restriction. [x] Progressing: [] Met: [] Not Met: [] Adjusted   5. Patient will have 0/10 pain with ADL's. 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    [x] Progressing: [] Met: [] Not Met: [] Adjusted     Overall Progression Towards Functional goals/ Treatment Progress Update:  [x] Patient is progressing as expected towards functional goals listed. [] Progression is slowed due to complexities/Impairments listed. [] Progression has been slowed due to co-morbidities. [] Plan just implemented, too soon to assess goals progression <30days   [] Goals require adjustment due to lack of progress  [] Patient is not progressing as expected and requires additional follow up with physician  [] Other    Prognosis for POC: [x] Good [] Fair  [] Poor    Patient requires continued skilled intervention: [x] Yes  [] No    Treatment/Activity Tolerance:  [x] Patient able to complete treatment  [] Patient limited by fatigue  [] Patient limited by pain    [] Patient limited by other medical complications  [] Other:     Return to Play: (if applicable)   []  Stage 1: Intro to Strength   []  Stage 2: Return to Run and Strength   []  Stage 3: Return to Jump and Strength   []  Stage 4: Dynamic Strength and Agility   []  Stage 5: Sport Specific Training     []  Ready to Return to Play, Meets All Above Stages   []  Not Ready for Return to Sports   Comments:                         PLAN: See eval  [x] Continue per plan of care [] Alter current plan (see comments above)  [] Plan of care initiated [] Hold pending MD visit [] Discharge    Electronically signed by:   Kristal Perez PTA    Note: If patient does not return for scheduled/ recommended follow up visits, this note will serve as a discharge from care along with most recent update on progress.

## 2022-02-14 ENCOUNTER — HOSPITAL ENCOUNTER (OUTPATIENT)
Dept: PHYSICAL THERAPY | Age: 62
Setting detail: THERAPIES SERIES
Discharge: HOME | End: 2022-02-14
Payer: COMMERCIAL

## 2022-02-14 PROCEDURE — 97110 THERAPEUTIC EXERCISES: CPT | Performed by: PHYSICAL THERAPY ASSISTANT

## 2022-02-14 PROCEDURE — 97140 MANUAL THERAPY 1/> REGIONS: CPT | Performed by: PHYSICAL THERAPY ASSISTANT

## 2022-02-14 PROCEDURE — 97112 NEUROMUSCULAR REEDUCATION: CPT | Performed by: PHYSICAL THERAPY ASSISTANT

## 2022-02-14 NOTE — FLOWSHEET NOTE
[facility], [address] [place] [address] [place], [address]  Phone: [phone], [phone]    Physical Therapy Treatment Note/ Progress Report:     Date:  2022    Patient Name:  Sarah Torres. :  1960  MRN: [id number]  Restrictions/Precautions:    Medical/Treatment Diagnosis Information:  Diagnosis: Left Rotator Cuff Repair on 1/10/22  Treatment Diagnosis: Y70.982  Insurance/Certification information:  PT Insurance Information: [employer]  Physician Information:  Referring Practitioner: Kymberly Matamoros MD  Has the plan of care been signed (Y/N):        []  Yes  [x]  No     Date of Patient follow up with Physician: 3/8/2022    Is this a Progress Report:     []  Yes  [x]  No      If Yes:  Date Range for reporting period:  Initial Eval: 2022  Beginnin2022 --- Endin2022    Progress report will be due (10 Rx or 30 days whichever is less):     Recertification will be due (POC Duration  / 90 days whichever is less): 2022     Visit # Insurance Allowable Auth Required   In Person 7 24 visits 22-22 []  Yes     []  No    [facility]   []  Yes     []  No    Total 7         Functional Scale: Quick Dash: 73% (Total Number Sum: 03/15)   Date assessed: 2022     Latex Allergy:  [x]NO      []YES  Preferred Language for Healthcare:   [x]English       []other:    Pain level: 0/10 currently     SUBJECTIVE: (5 weeks PO on 22)  Patient reports that he is making progress each week.      OBJECTIVE: See eval   Observation:    Test measurements:      RESTRICTIONS/PRECAUTIONS: HTN, Hx of heart problems          Exercises/Interventions:   Therapeutic Ex (66605)  Therapeutic Activity (46160)  NMR re-education (01588) Sets/Reps Notes/CUES   Pulley 5 min         Sub max pain free isometrics 10 x 10\"    Wall slides x10         Scap squeezes 3 x 10    Shoulder shrugs 3 x 10    Gripping Blue ball x3 min    Wrist AROM 3 x 10 ea With ball   UT stretch 3 x 30\" ea    Elbow flexion AROM x20 ea of 3 way         Standing pendulums 3x10 Flex/ext, clock, counter             Table slides 10 x 10\" ea 3 way    Swiss ball flexion 5\" x20         Cane press x20    Cane flexion 10 x 10\"    Cane ER 5\" x10 90/90 position   Cane supine punch x20                             Manual Intervention (97497)     Shoulder PROM- gentle 10' Flexion to 90°, abd to 90°, IR, ER to neutral                               Medbridge access code: Elder Greenhouse  Access Code: Elder Greenhouse  URL: [email or web address]  Date: 01/24/2022  Prepared by: Basilio Boyding    Exercises  Seated Scapular Retraction - 1-2 x daily - 7 x weekly - 3 sets - 10 reps - 5 s hold  Seated Shoulder Shrugs - 1-2 x daily - 7 x weekly - 3 sets - 10 reps  Seated Neck Sidebending Stretch - 1-2 x daily - 7 x weekly - 3 reps - 30s hold  Wrist AROM Radial Ulnar Deviation - 1-2 x daily - 7 x weekly - 3 sets - 10 reps  Forearm Strengthening with Ball Squeeze - 1-2 x daily - 7 x weekly - 3 sets - 10 reps  Seated Wrist Extension AROM - 1-2 x daily - 7 x weekly - 3 sets - 10 reps  Wrist Flexion Extension AROM with Fingers Curled and Palm Down - 1-2 x daily - 7 x weekly - 3 sets - 10 reps  Circular Shoulder Pendulum with Table Support - 1-2 x daily - 7 x weekly - 3 sets - 10 reps  Flexion-Extension Shoulder Pendulum with Table Support - 1-2 x daily - 7 x weekly - 3 sets - 10 reps  Seated Elbow Flexion Extension AROM - 1-2 x daily - 7 x weekly - 3 sets - 10 reps                  Patient Education 10'  Pt education with HEP and progression of PT along with compliance with HEP to aide with formal PT for optimal outcomes. Therapeutic Exercise and NMR EXR  [x] (39918) Provided verbal/tactile cueing for activities related to strengthening, flexibility, endurance, ROM  for improvements in scapular, scapulothoracic and UE control with self care, reaching, carrying, lifting, house/yardwork, driving/computer work.     [x] (82108) Provided verbal/tactile cueing for activities related to improving balance, coordination, kinesthetic sense, posture, motor skill, proprioception  to assist with  scapular, scapulothoracic and UE control with self care, reaching, carrying, lifting, house/yardwork, driving/computer work. Therapeutic Activities:    [x] (28053 or 08878) Provided verbal/tactile cueing for activities related to improving balance, coordination, kinesthetic sense, posture, motor skill, proprioception and motor activation to allow for proper function of scapular, scapulothoracic and UE control with self care, carrying, lifting, driving/computer work.      Home Exercise Program:    [x] (99348) Reviewed/Progressed HEP activities related to strengthening, flexibility, endurance, ROM of scapular, scapulothoracic and UE control with self care, reaching, carrying, lifting, house/yardwork, driving/computer work  [x] (60774) Reviewed/Progressed HEP activities related to improving balance, coordination, kinesthetic sense, posture, motor skill, proprioception of scapular, scapulothoracic and UE control with self care, reaching, carrying, lifting, house/yardwork, driving/computer work      Manual Treatments:  PROM / STM / Oscillations-Mobs:  G-I, II, III, IV (PA's, Inf., Post.)  [x] (13301) Provided manual therapy to mobilize soft tissue/joints of cervical/CT, scapular GHJ and UE for the purpose of modulating pain, promoting relaxation,  increasing ROM, reducing/eliminating soft tissue swelling/inflammation/restriction, improving soft tissue extensibility and allowing for proper ROM for normal function with self care, reaching, carrying, lifting, house/yardwork, driving/computer work    Modalities:      Charges:  Timed Code Treatment Minutes: 60'   Total Treatment Minutes:  60'   BWC:  TE TIME:  NMR TIME:  MANUAL TIME:  UNTIMED MINUTES:  Medicare Total:         [] EVAL (LOW) 32128 (typically 20 minutes face-to-face)  [] EVAL (MOD) 84896 (typically 30 minutes face-to-face)  [] EVAL (HIGH) 15640 (typically 45 minutes face-to-face)  [] RE-EVAL     [x] LY(91317) x 2    [] IONTO  [x] NMR (17692) x 1    [] VASO  [x] Manual (58661) x 1    [] Other:  [] TA x      [] Mech Traction (04072)  [] ES(attended) (02185)     [] ES (un) (07045):    ASSESSMENT:  See eval    GOALS:   Short Term Goals: To be achieved in: 2 weeks  1. Independent in HEP and progression per patient tolerance, in order to prevent re-injury. [] Progressing: [x] Met: [] Not Met: [] Adjusted   2. Patient will have a decrease in pain to facilitate improvement in movement, function, and ADLs as indicated by Functional Deficits. [] Progressing: [x] Met: [] Not Met: [] Adjusted     Long Term Goals: To be achieved in: 12 weeks  1. Disability index score of 20% or less for the QuickDash/Oswestry to assist with reaching prior level of function. [x] Progressing: [] Met: [] Not Met: [] Adjusted   2. Patient will demonstrate increased AROM on the left to equal the opposite side bilaterally to allow for ability to reach overhead pain and restriction free as indicated by patients Functional Deficits. [x] Progressing: [] Met: [] Not Met: [] Adjusted   3. Patient will demonstrate an increase in strength of the L UE to 4+/5 to allow for ability to perform all functional activities without restriction as indicated by patients Functional Deficits. [x] Progressing: [] Met: [] Not Met: [] Adjusted   4. Patient will return to all transfers, work activities, and functional activities without increased symptoms or restriction. [x] Progressing: [] Met: [] Not Met: [] Adjusted   5. Patient will have 0/10 pain with ADL's. 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    [x] Progressing: [] Met: [] Not Met: [] Adjusted     Overall Progression Towards Functional goals/ Treatment Progress Update:  [x] Patient is progressing as expected towards functional goals listed. [] Progression is slowed due to complexities/Impairments listed. [] Progression has been slowed due to co-morbidities. [] Plan just implemented, too soon to assess goals progression <30days   [] Goals require adjustment due to lack of progress  [] Patient is not progressing as expected and requires additional follow up with physician  [] Other    Prognosis for POC: [x] Good [] Fair  [] Poor    Patient requires continued skilled intervention: [x] Yes  [] No    Treatment/Activity Tolerance:  [x] Patient able to complete treatment  [] Patient limited by fatigue  [] Patient limited by pain    [] Patient limited by other medical complications  [] Other:     Return to Play: (if applicable)   []  Stage 1: Intro to Strength   []  Stage 2: Return to Run and Strength   []  Stage 3: Return to Jump and Strength   []  Stage 4: Dynamic Strength and Agility   []  Stage 5: Sport Specific Training     []  Ready to Return to Play, Meets All Above Stages   []  Not Ready for Return to Sports   Comments:                         PLAN: See eval  [x] Continue per plan of care [] Alter current plan (see comments above)  [] Plan of care initiated [] Hold pending MD visit [] Discharge    Electronically signed by:   Cayla Vu PTA    Note: If patient does not return for scheduled/ recommended follow up visits, this note will serve as a discharge from care along with most recent update on progress.

## 2022-02-15 ENCOUNTER — APPOINTMENT (OUTPATIENT)
Dept: PHYSICAL THERAPY | Age: 62
End: 2022-02-15
Payer: COMMERCIAL

## 2022-02-17 ENCOUNTER — HOSPITAL ENCOUNTER (OUTPATIENT)
Dept: PHYSICAL THERAPY | Age: 62
Setting detail: THERAPIES SERIES
Discharge: HOME | End: 2022-02-17
Payer: COMMERCIAL

## 2022-02-17 PROCEDURE — 97140 MANUAL THERAPY 1/> REGIONS: CPT | Performed by: PHYSICAL THERAPY ASSISTANT

## 2022-02-17 PROCEDURE — 97112 NEUROMUSCULAR REEDUCATION: CPT | Performed by: PHYSICAL THERAPY ASSISTANT

## 2022-02-17 PROCEDURE — 97110 THERAPEUTIC EXERCISES: CPT | Performed by: PHYSICAL THERAPY ASSISTANT

## 2022-02-17 NOTE — FLOWSHEET NOTE
[facility], [address] [place] [address] Rachna Bangura 06, [address]  Phone: [phone], [phone]    Physical Therapy Treatment Note/ Progress Report:     Date:  2022    Patient Name:  Adis Helton :  1960  MRN: [id number]  Restrictions/Precautions:    Medical/Treatment Diagnosis Information:  Diagnosis: Left Rotator Cuff Repair on 1/10/22  Treatment Diagnosis: J86.553  Insurance/Certification information:  PT Insurance Information: [employer]  Physician Information:  Referring Practitioner: Matt Loo MD  Has the plan of care been signed (Y/N):        []  Yes  [x]  No     Date of Patient follow up with Physician: 3/8/2022    Is this a Progress Report:     []  Yes  [x]  No      If Yes:  Date Range for reporting period:  Initial Eval: 2022  Beginnin2022 --- Endin2022    Progress report will be due (10 Rx or 30 days whichever is less): 5270    Recertification will be due (POC Duration  / 90 days whichever is less): 2022     Visit # Insurance Allowable Auth Required   In Person 8 24 visits 22-22 []  Yes     []  No    [facility]   []  Yes     []  No    Total 8         Functional Scale: Quick Dash: 73% (Total Number Sum: 88/18)   Date assessed: 2022     Latex Allergy:  [x]NO      []YES  Preferred Language for Healthcare:   [x]English       []other:    Pain level: 0/10 currently     SUBJECTIVE: (6 weeks PO on 22)  Pretty sore after last session - and reports some swelling in the pec area.      OBJECTIVE: See eval   Observation:    Test measurements:      RESTRICTIONS/PRECAUTIONS: HTN, Hx of heart problems          Exercises/Interventions:   Therapeutic Ex (95032)  Therapeutic Activity (44775)  NMR re-education (03394) Sets/Reps Notes/CUES   Pulley 5 min         Sub max pain free isometrics 10 x 10\"    Wall slides  Held due to soreness   Tricep extension Green x30         TB rows  TB ext Red x30  Red x30         Gripping Blue ball x3 min    Wrist AROM 3 x 10 ea With ball   UT stretch 3 x 30\" ea    Elbow flexion AROM x10 ea of 3 way         Standing pendulums 3x10 Flex/ext, clock, counter             Table slides 10 x 10\" ea 3 way    Swiss ball flexion 5\" x30         Cane press x20    Cane flexion 10 x 10\"    Cane ER 5\" x10 90/90 position   Cane supine punch x20                             Manual Intervention (26119)     Shoulder PROM- gentle 10' Flexion to 90°, abd to 90°, IR, ER to neutral                               Medbridge access code: Jenna Carre  Access Code: Jenna Carre  URL: [email or web address]  Date: 01/24/2022  Prepared by: Abdirizak Voss    Exercises  Seated Scapular Retraction - 1-2 x daily - 7 x weekly - 3 sets - 10 reps - 5 s hold  Seated Shoulder Shrugs - 1-2 x daily - 7 x weekly - 3 sets - 10 reps  Seated Neck Sidebending Stretch - 1-2 x daily - 7 x weekly - 3 reps - 30s hold  Wrist AROM Radial Ulnar Deviation - 1-2 x daily - 7 x weekly - 3 sets - 10 reps  Forearm Strengthening with Ball Squeeze - 1-2 x daily - 7 x weekly - 3 sets - 10 reps  Seated Wrist Extension AROM - 1-2 x daily - 7 x weekly - 3 sets - 10 reps  Wrist Flexion Extension AROM with Fingers Curled and Palm Down - 1-2 x daily - 7 x weekly - 3 sets - 10 reps  Circular Shoulder Pendulum with Table Support - 1-2 x daily - 7 x weekly - 3 sets - 10 reps  Flexion-Extension Shoulder Pendulum with Table Support - 1-2 x daily - 7 x weekly - 3 sets - 10 reps  Seated Elbow Flexion Extension AROM - 1-2 x daily - 7 x weekly - 3 sets - 10 reps                  Patient Education 10'  Pt education with HEP and progression of PT along with compliance with HEP to aide with formal PT for optimal outcomes.       Therapeutic Exercise and NMR EXR  [x] (70126) Provided verbal/tactile cueing for activities related to strengthening, flexibility, endurance, ROM  for improvements in scapular, scapulothoracic and UE control with self care, reaching, carrying, lifting, house/yardwork, driving/computer work. [x] (12792) Provided verbal/tactile cueing for activities related to improving balance, coordination, kinesthetic sense, posture, motor skill, proprioception  to assist with  scapular, scapulothoracic and UE control with self care, reaching, carrying, lifting, house/yardwork, driving/computer work. Therapeutic Activities:    [x] (60140 or 08207) Provided verbal/tactile cueing for activities related to improving balance, coordination, kinesthetic sense, posture, motor skill, proprioception and motor activation to allow for proper function of scapular, scapulothoracic and UE control with self care, carrying, lifting, driving/computer work.      Home Exercise Program:    [x] (30973) Reviewed/Progressed HEP activities related to strengthening, flexibility, endurance, ROM of scapular, scapulothoracic and UE control with self care, reaching, carrying, lifting, house/yardwork, driving/computer work  [x] (98262) Reviewed/Progressed HEP activities related to improving balance, coordination, kinesthetic sense, posture, motor skill, proprioception of scapular, scapulothoracic and UE control with self care, reaching, carrying, lifting, house/yardwork, driving/computer work      Manual Treatments:  PROM / STM / Oscillations-Mobs:  G-I, II, III, IV (PA's, Inf., Post.)  [x] (92058) Provided manual therapy to mobilize soft tissue/joints of cervical/CT, scapular GHJ and UE for the purpose of modulating pain, promoting relaxation,  increasing ROM, reducing/eliminating soft tissue swelling/inflammation/restriction, improving soft tissue extensibility and allowing for proper ROM for normal function with self care, reaching, carrying, lifting, house/yardwork, driving/computer work    Modalities:      Charges:  Timed Code Treatment Minutes: 60'   Total Treatment Minutes:  60'   BWC:  TE TIME:  NMR TIME:  MANUAL TIME:  UNTIMED MINUTES:  Medicare Total:         [] EVAL (LOW) 39320 (typically 20 minutes face-to-face)  [] EVAL (MOD) 16976 (typically 30 minutes face-to-face)  [] EVAL (HIGH) 17032 (typically 45 minutes face-to-face)  [] RE-EVAL     [x] EZ(03546) x 2    [] IONTO  [x] NMR (73604) x 1    [] VASO  [x] Manual (72476) x 1    [] Other:  [] TA x      [] Mech Traction (88303)  [] ES(attended) (61796)     [] ES (un) (74522):    ASSESSMENT:  See eval    GOALS:   Short Term Goals: To be achieved in: 2 weeks  1. Independent in HEP and progression per patient tolerance, in order to prevent re-injury. [] Progressing: [x] Met: [] Not Met: [] Adjusted   2. Patient will have a decrease in pain to facilitate improvement in movement, function, and ADLs as indicated by Functional Deficits. [] Progressing: [x] Met: [] Not Met: [] Adjusted     Long Term Goals: To be achieved in: 12 weeks  1. Disability index score of 20% or less for the QuickDash/Oswestry to assist with reaching prior level of function. [x] Progressing: [] Met: [] Not Met: [] Adjusted   2. Patient will demonstrate increased AROM on the left to equal the opposite side bilaterally to allow for ability to reach overhead pain and restriction free as indicated by patients Functional Deficits. [x] Progressing: [] Met: [] Not Met: [] Adjusted   3. Patient will demonstrate an increase in strength of the L UE to 4+/5 to allow for ability to perform all functional activities without restriction as indicated by patients Functional Deficits. [x] Progressing: [] Met: [] Not Met: [] Adjusted   4. Patient will return to all transfers, work activities, and functional activities without increased symptoms or restriction. [x] Progressing: [] Met: [] Not Met: [] Adjusted   5. Patient will have 0/10 pain with ADL's. 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    [x] Progressing: [] Met: [] Not Met: [] Adjusted     Overall Progression Towards Functional goals/ Treatment Progress Update:  [x] Patient is progressing as expected towards functional goals listed. [] Progression is slowed due to complexities/Impairments listed. [] Progression has been slowed due to co-morbidities. [] Plan just implemented, too soon to assess goals progression <30days   [] Goals require adjustment due to lack of progress  [] Patient is not progressing as expected and requires additional follow up with physician  [] Other    Prognosis for POC: [x] Good [] Fair  [] Poor    Patient requires continued skilled intervention: [x] Yes  [] No    Treatment/Activity Tolerance:  [x] Patient able to complete treatment  [] Patient limited by fatigue  [] Patient limited by pain    [] Patient limited by other medical complications  [] Other:     Return to Play: (if applicable)   []  Stage 1: Intro to Strength   []  Stage 2: Return to Run and Strength   []  Stage 3: Return to Jump and Strength   []  Stage 4: Dynamic Strength and Agility   []  Stage 5: Sport Specific Training     []  Ready to Return to Play, Meets All Above Stages   []  Not Ready for Return to Sports   Comments:                         PLAN: See eval  [x] Continue per plan of care [] Alter current plan (see comments above)  [] Plan of care initiated [] Hold pending MD visit [] Discharge    Electronically signed by:   Mckayla Landaverde PTA    Note: If patient does not return for scheduled/ recommended follow up visits, this note will serve as a discharge from care along with most recent update on progress.

## 2022-02-21 ENCOUNTER — HOSPITAL ENCOUNTER (OUTPATIENT)
Dept: PHYSICAL THERAPY | Age: 62
Setting detail: THERAPIES SERIES
Discharge: HOME | End: 2022-02-21
Payer: COMMERCIAL

## 2022-02-21 PROCEDURE — 97112 NEUROMUSCULAR REEDUCATION: CPT | Performed by: PHYSICAL THERAPY ASSISTANT

## 2022-02-21 PROCEDURE — 97140 MANUAL THERAPY 1/> REGIONS: CPT | Performed by: PHYSICAL THERAPY ASSISTANT

## 2022-02-21 PROCEDURE — 97110 THERAPEUTIC EXERCISES: CPT | Performed by: PHYSICAL THERAPY ASSISTANT

## 2022-02-21 NOTE — FLOWSHEET NOTE
[facility], [address] [place] [address] Cynhtia Bangura, [facility]  Phone: [phone], [phone]    Physical Therapy Treatment Note/ Progress Report:     Date:  2022    Patient Name:  Amanda Harris. :  1960  MRN: [id number]  Restrictions/Precautions:    Medical/Treatment Diagnosis Information:  Diagnosis: Left Rotator Cuff Repair on 1/10/22  Treatment Diagnosis: Y06.989  Insurance/Certification information:  PT Insurance Information: [employer]  Physician Information:  Referring Practitioner: Mervin Donovan MD  Has the plan of care been signed (Y/N):        []  Yes  [x]  No     Date of Patient follow up with Physician: 3/8/2022    Is this a Progress Report:     []  Yes  [x]  No      If Yes:  Date Range for reporting period:  Initial Eval: 2022  Beginnin2022 --- Endin2022    Progress report will be due (10 Rx or 30 days whichever is less):     Recertification will be due (POC Duration  / 90 days whichever is less): 2022     Visit # Insurance Allowable Auth Required   In Person 9 24 visits 22-22 []  Yes     []  No    [facility]   []  Yes     []  No    Total 9         Functional Scale: Quick Dash: 73% (Total Number Sum: 48/14)   Date assessed: 2022     Latex Allergy:  [x]NO      []YES  Preferred Language for Healthcare:   [x]English       []other:    Pain level: 0/10 currently     SUBJECTIVE: (6 weeks PO on 22) Patient reports that he is doing fairly well, sore in bicep after last session - no other new complaints reported.     OBJECTIVE: See eval   Observation:    Test measurements:      RESTRICTIONS/PRECAUTIONS: HTN, Hx of heart problems                  Exercises/Interventions:   Therapeutic Ex (76387)  Therapeutic Activity (18876)  NMR re-education (21792) Sets/Reps Notes/CUES   Pulley 5 min              Wall slides x10    Tricep extension Green x30         TB rows  TB ext Red x30  Red x30    TB IR walkout scapular, scapulothoracic and UE control with self care, reaching, carrying, lifting, house/yardwork, driving/computer work. [x] (33895) Provided verbal/tactile cueing for activities related to improving balance, coordination, kinesthetic sense, posture, motor skill, proprioception  to assist with  scapular, scapulothoracic and UE control with self care, reaching, carrying, lifting, house/yardwork, driving/computer work. Therapeutic Activities:    [x] (50762 or 04669) Provided verbal/tactile cueing for activities related to improving balance, coordination, kinesthetic sense, posture, motor skill, proprioception and motor activation to allow for proper function of scapular, scapulothoracic and UE control with self care, carrying, lifting, driving/computer work.      Home Exercise Program:    [x] (28066) Reviewed/Progressed HEP activities related to strengthening, flexibility, endurance, ROM of scapular, scapulothoracic and UE control with self care, reaching, carrying, lifting, house/yardwork, driving/computer work  [x] (57415) Reviewed/Progressed HEP activities related to improving balance, coordination, kinesthetic sense, posture, motor skill, proprioception of scapular, scapulothoracic and UE control with self care, reaching, carrying, lifting, house/yardwork, driving/computer work      Manual Treatments:  PROM / STM / Oscillations-Mobs:  G-I, II, III, IV (PA's, Inf., Post.)  [x] (15644) Provided manual therapy to mobilize soft tissue/joints of cervical/CT, scapular GHJ and UE for the purpose of modulating pain, promoting relaxation,  increasing ROM, reducing/eliminating soft tissue swelling/inflammation/restriction, improving soft tissue extensibility and allowing for proper ROM for normal function with self care, reaching, carrying, lifting, house/yardwork, driving/computer work    Modalities:      Charges:  Timed Code Treatment Minutes: 61'   Total Treatment Minutes:  60'   BWC:  TE TIME:  NMR TIME:  MANUAL TIME:  UNTIMED MINUTES:  Medicare Total:         [] EVAL (LOW) 51537 (typically 20 minutes face-to-face)  [] EVAL (MOD) 82113 (typically 30 minutes face-to-face)  [] EVAL (HIGH) 79332 (typically 45 minutes face-to-face)  [] RE-EVAL     [x] HK(01916) x 2    [] IONTO  [x] NMR (35032) x 1    [] VASO  [x] Manual (75163) x 1    [] Other:  [] TA x      [] Mech Traction (14947)  [] ES(attended) (62776)     [] ES (un) (91569):    ASSESSMENT:  See eval    GOALS:   Short Term Goals: To be achieved in: 2 weeks  1. Independent in HEP and progression per patient tolerance, in order to prevent re-injury. [] Progressing: [x] Met: [] Not Met: [] Adjusted   2. Patient will have a decrease in pain to facilitate improvement in movement, function, and ADLs as indicated by Functional Deficits. [] Progressing: [x] Met: [] Not Met: [] Adjusted     Long Term Goals: To be achieved in: 12 weeks  1. Disability index score of 20% or less for the QuickDash/Oswestry to assist with reaching prior level of function. [x] Progressing: [] Met: [] Not Met: [] Adjusted   2. Patient will demonstrate increased AROM on the left to equal the opposite side bilaterally to allow for ability to reach overhead pain and restriction free as indicated by patients Functional Deficits. [x] Progressing: [] Met: [] Not Met: [] Adjusted   3. Patient will demonstrate an increase in strength of the L UE to 4+/5 to allow for ability to perform all functional activities without restriction as indicated by patients Functional Deficits. [x] Progressing: [] Met: [] Not Met: [] Adjusted   4. Patient will return to all transfers, work activities, and functional activities without increased symptoms or restriction. [x] Progressing: [] Met: [] Not Met: [] Adjusted   5. Patient will have 0/10 pain with ADL's. 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    [x] Progressing: [] Met: [] Not Met: [] Adjusted     Overall Progression Towards Functional goals/ Treatment Progress Update:  [x] Patient is progressing as expected towards functional goals listed. [] Progression is slowed due to complexities/Impairments listed. [] Progression has been slowed due to co-morbidities. [] Plan just implemented, too soon to assess goals progression <30days   [] Goals require adjustment due to lack of progress  [] Patient is not progressing as expected and requires additional follow up with physician  [] Other    Prognosis for POC: [x] Good [] Fair  [] Poor    Patient requires continued skilled intervention: [x] Yes  [] No    Treatment/Activity Tolerance:  [x] Patient able to complete treatment  [] Patient limited by fatigue  [] Patient limited by pain    [] Patient limited by other medical complications  [] Other:     Return to Play: (if applicable)   []  Stage 1: Intro to Strength   []  Stage 2: Return to Run and Strength   []  Stage 3: Return to Jump and Strength   []  Stage 4: Dynamic Strength and Agility   []  Stage 5: Sport Specific Training     []  Ready to Return to Play, Meets All Above Stages   []  Not Ready for Return to Sports   Comments:                         PLAN: See eval  [x] Continue per plan of care [] Alter current plan (see comments above)  [] Plan of care initiated [] Hold pending MD visit [] Discharge    Electronically signed by:   Gualberto Maldonado PTA    Note: If patient does not return for scheduled/ recommended follow up visits, this note will serve as a discharge from care along with most recent update on progress.

## 2022-02-22 ENCOUNTER — APPOINTMENT (OUTPATIENT)
Dept: PHYSICAL THERAPY | Age: 62
End: 2022-02-22
Payer: COMMERCIAL

## 2022-02-24 ENCOUNTER — HOSPITAL ENCOUNTER (OUTPATIENT)
Dept: PHYSICAL THERAPY | Age: 62
Setting detail: THERAPIES SERIES
Discharge: HOME | End: 2022-02-24
Payer: COMMERCIAL

## 2022-02-24 PROCEDURE — 97140 MANUAL THERAPY 1/> REGIONS: CPT | Performed by: PHYSICAL THERAPY ASSISTANT

## 2022-02-24 PROCEDURE — 97110 THERAPEUTIC EXERCISES: CPT | Performed by: PHYSICAL THERAPY ASSISTANT

## 2022-02-24 PROCEDURE — 97112 NEUROMUSCULAR REEDUCATION: CPT | Performed by: PHYSICAL THERAPY ASSISTANT

## 2022-02-24 NOTE — FLOWSHEET NOTE
[facility], [address] [place] [address] Cynthia Bangura, [address]  Phone: [phone], [phone]    Date: 2022          Patient Name; :  Juanito Rod.; 1960   Dx/ICD Code:  Left Rotator Cuff Repair on 1/10/22  Treatment Diagnosis: M75.102      Physician: Favorito        Total PT Visits: 10     Measures Previous Current   Pain (0-10)  3-4   Disability %  32%   PROM     Shoulder Flexion  165 AAROM   Shoulder ER  60 with cane @90/90 AAROM          Specific Functional Improvements & Impressions:  Patient progressing in terms of ROM. Patient takes constant cues and reminders to not actively use the arm by himself. Plan & Recommendations:  [x] Continue rehabilitation due to objective improvement and continued functional deficits with frequency and duration: 2x week - 4 weeks  [] Progress toward  []GAP, []Work Conditioning, []Independent HEP   [] Discharge due to   [] All goals achieved, [] Maximized \"medical necessity\" [] No subjective or objective improvements      Electronically signed by:  Iesha Mchugh PTA, Mathews Knapp PT, MPT,ATC, cert DN  Therapy Plan of Care Re-Certification  This patient has been re-evaluated for physical therapy services and for therapy to continue, Medicare, Medicaid and other insurances require periodic physician review of the treatment plan. Please review the above re-evaluation and verify that you agree with plan of care as established above by signing the attached document and return it to our office or note changes to established plan below  [] Follow treatment plan as above [] Discontinue physical therapy  [] Change plan to:                                 __________________________________________________    Physician Signature:____________________________________ Date:____________  By signing above, therapists plan is approved by physician    If you have any questions or concerns, please don't hesitate to call.   Thank you for your referral.    [facility] Therapy office  ([phone])     Fax [phone]     Physical Therapy Treatment Note/ Progress Report:     Date:  2022    Patient Name:  Adelaida Deluna. :  1960  MRN: [id number]  Restrictions/Precautions:    Medical/Treatment Diagnosis Information:  Diagnosis: Left Rotator Cuff Repair on 1/10/22  Treatment Diagnosis: D31.947  Insurance/Certification information:  PT Insurance Information: [employer]  Physician Information:  Referring Practitioner: Buffy Rodrigues MD  Has the plan of care been signed (Y/N):        []  Yes  [x]  No     Date of Patient follow up with Physician: 3/8/2022    Is this a Progress Report:     []  Yes  [x]  No      If Yes:  Date Range for reporting period:  Initial Eval: 2022  Beginnin2022 --- Endin2022  Beginnin22 --- Ending 3/24/22    Progress report will be due (10 Rx or 30 days whichever is less): 2640    Recertification will be due (POC Duration  / 90 days whichever is less): 2022     Visit # Insurance Allowable Auth Required   In Person 10 24 visits 22-22 []  Yes     []  No    [facility]   []  Yes     []  No    Total 10         Functional Scale: Quick Dash: 73% (Total Number Sum: 53/83)   Date assessed: 2022   Functional Scale: Quick Dash: 32% (Total = 25/55)    22    Latex Allergy:  [x]NO      []YES  Preferred Language for Healthcare:   [x]English       []other:    Pain level: 0/10 currently     SUBJECTIVE: (6 weeks PO on 22) Patient reports that he was sore after last visit.      OBJECTIVE: See eval   Observation:    Test measurements:      RESTRICTIONS/PRECAUTIONS: HTN, Hx of heart problems                  Exercises/Interventions:   Therapeutic Ex (86157)  Therapeutic Activity (65726)  NMR re-education (67968) Sets/Reps Notes/CUES   Pulley 5 min              Wall slides x15    Tricep extension Green x30         TB rows  TB ext Green x30  Red x30    TB IR walkout isometrics  TB ER walkout isometrics Red 10x10\"  Red 10x10\"                   Gripping Blue ball x3 min    Wrist AROM 3 x 10 ea With ball   UT stretch 3 x 30\" ea    Elbow flexion AROM x10 ea of 3 way 2#        Standing pendulums 3x10 Flex/ext, clock, counter             Table slides 10 x 10\" ea 3 way    Swiss ball flexion 5\" x30         Cane press x20    Cane flexion 10 x 10\"    Cane ER 5\" x10 90/90 position   Cane supine punch x20                             Manual Intervention (99253)     Shoulder PROM- gentle 10' Flexion to 90°, abd to 90°, IR, ER to neutral                               Medbridge access code: Massiel Cardozo  Access Code: Massiel Cardozo  URL: [employer]. com/  Date: 01/24/2022  Prepared by: Mario Mora    Exercises  Seated Scapular Retraction - 1-2 x daily - 7 x weekly - 3 sets - 10 reps - 5 s hold  Seated Shoulder Shrugs - 1-2 x daily - 7 x weekly - 3 sets - 10 reps  Seated Neck Sidebending Stretch - 1-2 x daily - 7 x weekly - 3 reps - 30s hold  Wrist AROM Radial Ulnar Deviation - 1-2 x daily - 7 x weekly - 3 sets - 10 reps  Forearm Strengthening with Ball Squeeze - 1-2 x daily - 7 x weekly - 3 sets - 10 reps  Seated Wrist Extension AROM - 1-2 x daily - 7 x weekly - 3 sets - 10 reps  Wrist Flexion Extension AROM with Fingers Curled and Palm Down - 1-2 x daily - 7 x weekly - 3 sets - 10 reps  Circular Shoulder Pendulum with Table Support - 1-2 x daily - 7 x weekly - 3 sets - 10 reps  Flexion-Extension Shoulder Pendulum with Table Support - 1-2 x daily - 7 x weekly - 3 sets - 10 reps  Seated Elbow Flexion Extension AROM - 1-2 x daily - 7 x weekly - 3 sets - 10 reps                  Patient Education 10'  Pt education with HEP and progression of PT along with compliance with HEP to aide with formal PT for optimal outcomes.       Therapeutic Exercise and NMR EXR  [x] (22897) Provided verbal/tactile cueing for activities related to strengthening, flexibility, endurance, ROM  for improvements in scapular, scapulothoracic and UE control with self care, reaching, carrying, lifting, house/yardwork, driving/computer work. [x] (34242) Provided verbal/tactile cueing for activities related to improving balance, coordination, kinesthetic sense, posture, motor skill, proprioception  to assist with  scapular, scapulothoracic and UE control with self care, reaching, carrying, lifting, house/yardwork, driving/computer work. Therapeutic Activities:    [x] (56641 or 54790) Provided verbal/tactile cueing for activities related to improving balance, coordination, kinesthetic sense, posture, motor skill, proprioception and motor activation to allow for proper function of scapular, scapulothoracic and UE control with self care, carrying, lifting, driving/computer work.      Home Exercise Program:    [x] (00074) Reviewed/Progressed HEP activities related to strengthening, flexibility, endurance, ROM of scapular, scapulothoracic and UE control with self care, reaching, carrying, lifting, house/yardwork, driving/computer work  [x] (22632) Reviewed/Progressed HEP activities related to improving balance, coordination, kinesthetic sense, posture, motor skill, proprioception of scapular, scapulothoracic and UE control with self care, reaching, carrying, lifting, house/yardwork, driving/computer work      Manual Treatments:  PROM / STM / Oscillations-Mobs:  G-I, II, III, IV (PA's, Inf., Post.)  [x] (99962) Provided manual therapy to mobilize soft tissue/joints of cervical/CT, scapular GHJ and UE for the purpose of modulating pain, promoting relaxation,  increasing ROM, reducing/eliminating soft tissue swelling/inflammation/restriction, improving soft tissue extensibility and allowing for proper ROM for normal function with self care, reaching, carrying, lifting, house/yardwork, driving/computer work    Modalities:      Charges:  Timed Code Treatment Minutes: 61'   Total Treatment Minutes:  60'   BWC:  TE TIME:  NMR TIME:  MANUAL TIME:  UNTIMED MINUTES:  Medicare Total:         [] EVAL (LOW) 54141 (typically 20 minutes face-to-face)  [] EVAL (MOD) 55949 (typically 30 minutes face-to-face)  [] EVAL (HIGH) 05207 (typically 45 minutes face-to-face)  [] RE-EVAL     [x] DANIEL(73030) x 2    [] IONTO  [x] NMR (28080) x 1    [] VASO  [x] Manual (16649) x 1    [] Other:  [] TA x      [] Mech Traction (91656)  [] ES(attended) (33176)     [] ES (un) (51164):    ASSESSMENT:  See eval    GOALS:   Short Term Goals: To be achieved in: 2 weeks  1. Independent in HEP and progression per patient tolerance, in order to prevent re-injury. [] Progressing: [x] Met: [] Not Met: [] Adjusted   2. Patient will have a decrease in pain to facilitate improvement in movement, function, and ADLs as indicated by Functional Deficits. [] Progressing: [x] Met: [] Not Met: [] Adjusted     Long Term Goals: To be achieved in: 12 weeks  1. Disability index score of 20% or less for the QuickDash/Oswestry to assist with reaching prior level of function. [x] Progressing: [] Met: [] Not Met: [] Adjusted   2. Patient will demonstrate increased AROM on the left to equal the opposite side bilaterally to allow for ability to reach overhead pain and restriction free as indicated by patients Functional Deficits. [x] Progressing: [] Met: [] Not Met: [] Adjusted   3. Patient will demonstrate an increase in strength of the L UE to 4+/5 to allow for ability to perform all functional activities without restriction as indicated by patients Functional Deficits. [x] Progressing: [] Met: [] Not Met: [] Adjusted   4. Patient will return to all transfers, work activities, and functional activities without increased symptoms or restriction. [x] Progressing: [] Met: [] Not Met: [] Adjusted   5. Patient will have 0/10 pain with ADL's. 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    [x] Progressing: [] Met: [] Not Met: [] Adjusted     Overall Progression Towards Functional goals/ Treatment Progress Update:  [x] Patient is progressing as expected towards functional goals listed. [] Progression is slowed due to complexities/Impairments listed. [] Progression has been slowed due to co-morbidities. [] Plan just implemented, too soon to assess goals progression <30days   [] Goals require adjustment due to lack of progress  [] Patient is not progressing as expected and requires additional follow up with physician  [] Other    Prognosis for POC: [x] Good [] Fair  [] Poor    Patient requires continued skilled intervention: [x] Yes  [] No    Treatment/Activity Tolerance:  [x] Patient able to complete treatment  [] Patient limited by fatigue  [] Patient limited by pain    [] Patient limited by other medical complications  [] Other:     Return to Play: (if applicable)   []  Stage 1: Intro to Strength   []  Stage 2: Return to Run and Strength   []  Stage 3: Return to Jump and Strength   []  Stage 4: Dynamic Strength and Agility   []  Stage 5: Sport Specific Training     []  Ready to Return to Play, Meets All Above Stages   []  Not Ready for Return to Sports   Comments:                         PLAN: See eval  [x] Continue per plan of care [] Alter current plan (see comments above)  [] Plan of care initiated [] Hold pending MD visit [] Discharge    Electronically signed by:   Kristal Perez, PTA ; Basilio Simeon PT, MPT,ATC, cert DN  Note: If patient does not return for scheduled/ recommended follow up visits, this note will serve as a discharge from care along with most recent update on progress.

## 2022-02-28 ENCOUNTER — HOSPITAL ENCOUNTER (OUTPATIENT)
Dept: PHYSICAL THERAPY | Age: 62
Setting detail: THERAPIES SERIES
Discharge: HOME | End: 2022-02-28
Payer: COMMERCIAL

## 2022-02-28 PROCEDURE — 97110 THERAPEUTIC EXERCISES: CPT | Performed by: PHYSICAL THERAPY ASSISTANT

## 2022-02-28 PROCEDURE — 97140 MANUAL THERAPY 1/> REGIONS: CPT | Performed by: PHYSICAL THERAPY ASSISTANT

## 2022-02-28 PROCEDURE — 97112 NEUROMUSCULAR REEDUCATION: CPT | Performed by: PHYSICAL THERAPY ASSISTANT

## 2022-02-28 RX ORDER — MELOXICAM 15 MG/1
15 TABLET ORAL DAILY
Qty: 30 TABLET | Refills: 5 | Status: SHIPPED | OUTPATIENT
Start: 2022-02-28 | End: 2022-08-29

## 2022-02-28 NOTE — FLOWSHEET NOTE
[facility], [address] [place] [address] Cynthia Bangura, [facility]  Phone: [phone], [phone]      Physical Therapy Treatment Note/ Progress Report:     Date:  2022    Patient Name:  Denisha Waterman. :  1960  MRN: [id number]  Restrictions/Precautions:    Medical/Treatment Diagnosis Information:  Diagnosis: Left Rotator Cuff Repair on 1/10/22  Treatment Diagnosis: N49.144  Insurance/Certification information:  PT Insurance Information: Hum[employer]  Physician Information:  Referring Practitioner: Doretha Bauman MD  Has the plan of care been signed (Y/N):        []  Yes  [x]  No     Date of Patient follow up with Physician: 3/8/2022    Is this a Progress Report:     []  Yes  [x]  No      If Yes:  Date Range for reporting period:  Initial Eval: 2022  Beginnin2022 --- Endin2022  Beginnin22 --- Ending 3/24/22    Progress report will be due (10 Rx or 30 days whichever is less):     Recertification will be due (POC Duration  / 90 days whichever is less): 2022     Visit # Insurance Allowable Auth Required   In Person 11 24 visits 22-22 []  Yes     []  No    [facility]   []  Yes     []  No    Total 11         Functional Scale: Quick Dash: 73% (Total Number Sum: 71/89)   Date assessed: 2022   Functional Scale: Quick Dash: 32% (Total = 25/55)    22    Latex Allergy:  [x]NO      []YES  Preferred Language for Healthcare:   [x]English       []other:    Pain level: 0/10 currently     SUBJECTIVE: (7weeks PO on 22) Doing OK. 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     OBJECTIVE: See eval   Observation:    Test measurements:      RESTRICTIONS/PRECAUTIONS: HTN, Hx of heart problems                  Exercises/Interventions:   Therapeutic Ex (82395)  Therapeutic Activity (50914)  NMR re-education (87514) Sets/Reps Notes/CUES   Pulley 5 min              Wall slides x20                   Tricep extension Green x30         TB rows  TB ext Green x30  Red x30    TB IR walkout isometrics  TB ER walkout isometrics Red 10x10\"  Red 10x10\"                   Gripping    Wrist AROM With ball   UT stretch    Elbow flexion AROM x10 ea of 3 way 2#        Standing pendulums  Flex/ext, clock, counter             Table slides  3 way    Swiss ball flexion 5\" x30         Cane press x20    Cane flexion 10 x 10\"    Cane ER 10\" x10 90/90 position   Cane supine punch         Supine punch X20     Supine flexion  x15    SL ER / SL Flexion / Short lever ABD  X20 / x20 / x20         Manual Intervention (91585)     Shoulder PROM- gentle 10' Flexion to 90°, abd to 90°, IR, ER to neutral                               [employer] access code: [email or web address]  Access Code: Andressa Briggs  URL: [employer] com/  Date: 01/24/2022  Prepared by: Mike Mcdonald    Exercises  Seated Scapular Retraction - 1-2 x daily - 7 x weekly - 3 sets - 10 reps - 5 s hold  Seated Shoulder Shrugs - 1-2 x daily - 7 x weekly - 3 sets - 10 reps  Seated Neck Sidebending Stretch - 1-2 x daily - 7 x weekly - 3 reps - 30s hold  Wrist AROM Radial Ulnar Deviation - 1-2 x daily - 7 x weekly - 3 sets - 10 reps  Forearm Strengthening with Ball Squeeze - 1-2 x daily - 7 x weekly - 3 sets - 10 reps  Seated Wrist Extension AROM - 1-2 x daily - 7 x weekly - 3 sets - 10 reps  Wrist Flexion Extension AROM with Fingers Curled and Palm Down - 1-2 x daily - 7 x weekly - 3 sets - 10 reps  Circular Shoulder Pendulum with Table Support - 1-2 x daily - 7 x weekly - 3 sets - 10 reps  Flexion-Extension Shoulder Pendulum with Table Support - 1-2 x daily - 7 x weekly - 3 sets - 10 reps  Seated Elbow Flexion Extension AROM - 1-2 x daily - 7 x weekly - 3 sets - 10 reps                  Patient Education 10'  Pt education with HEP and progression of PT along with compliance with HEP to aide with formal PT for optimal outcomes.       Therapeutic Exercise and NMR EXR  [x] (93287) Provided verbal/tactile cueing for activities related to strengthening, flexibility, endurance, ROM  for improvements in scapular, scapulothoracic and UE control with self care, reaching, carrying, lifting, house/yardwork, driving/computer work. [x] (87102) Provided verbal/tactile cueing for activities related to improving balance, coordination, kinesthetic sense, posture, motor skill, proprioception  to assist with  scapular, scapulothoracic and UE control with self care, reaching, carrying, lifting, house/yardwork, driving/computer work. Therapeutic Activities:    [x] (96814 or 65548) Provided verbal/tactile cueing for activities related to improving balance, coordination, kinesthetic sense, posture, motor skill, proprioception and motor activation to allow for proper function of scapular, scapulothoracic and UE control with self care, carrying, lifting, driving/computer work.      Home Exercise Program:    [x] (15274) Reviewed/Progressed HEP activities related to strengthening, flexibility, endurance, ROM of scapular, scapulothoracic and UE control with self care, reaching, carrying, lifting, house/yardwork, driving/computer work  [x] (16817) Reviewed/Progressed HEP activities related to improving balance, coordination, kinesthetic sense, posture, motor skill, proprioception of scapular, scapulothoracic and UE control with self care, reaching, carrying, lifting, house/yardwork, driving/computer work      Manual Treatments:  PROM / STM / Oscillations-Mobs:  G-I, II, III, IV (PA's, Inf., Post.)  [x] (08226) Provided manual therapy to mobilize soft tissue/joints of cervical/CT, scapular GHJ and UE for the purpose of modulating pain, promoting relaxation,  increasing ROM, reducing/eliminating soft tissue swelling/inflammation/restriction, improving soft tissue extensibility and allowing for proper ROM for normal function with self care, reaching, carrying, lifting, house/yardwork, driving/computer work    Modalities: Charges:  Timed Code Treatment Minutes: 61'   Total Treatment Minutes:  60'   BWC:  TE TIME:  NMR TIME:  MANUAL TIME:  UNTIMED MINUTES:  Medicare Total:         [] EVAL (LOW) 78768 (typically 20 minutes face-to-face)  [] EVAL (MOD) 85358 (typically 30 minutes face-to-face)  [] EVAL (HIGH) 87277 (typically 45 minutes face-to-face)  [] RE-EVAL     [x] JW(85531) x 2    [] IONTO  [x] NMR (54790) x 1    [] VASO  [x] Manual (58497) x 1    [] Other:  [] TA x      [] Mech Traction (32543)  [] ES(attended) (90645)     [] ES (un) (21467):    ASSESSMENT:  See eval    GOALS:   Short Term Goals: To be achieved in: 2 weeks  1. Independent in HEP and progression per patient tolerance, in order to prevent re-injury. [] Progressing: [x] Met: [] Not Met: [] Adjusted   2. Patient will have a decrease in pain to facilitate improvement in movement, function, and ADLs as indicated by Functional Deficits. [] Progressing: [x] Met: [] Not Met: [] Adjusted     Long Term Goals: To be achieved in: 12 weeks  1. Disability index score of 20% or less for the QuickDash/Oswestry to assist with reaching prior level of function. [x] Progressing: [] Met: [] Not Met: [] Adjusted   2. Patient will demonstrate increased AROM on the left to equal the opposite side bilaterally to allow for ability to reach overhead pain and restriction free as indicated by patients Functional Deficits. [x] Progressing: [] Met: [] Not Met: [] Adjusted   3. Patient will demonstrate an increase in strength of the L UE to 4+/5 to allow for ability to perform all functional activities without restriction as indicated by patients Functional Deficits. [x] Progressing: [] Met: [] Not Met: [] Adjusted   4. Patient will return to all transfers, work activities, and functional activities without increased symptoms or restriction. [x] Progressing: [] Met: [] Not Met: [] Adjusted   5. Patient will have 0/10 pain with ADL's.   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 [x] Progressing: [] Met: [] Not Met: [] Adjusted     Overall Progression Towards Functional goals/ Treatment Progress Update:  [x] Patient is progressing as expected towards functional goals listed. [] Progression is slowed due to complexities/Impairments listed. [] Progression has been slowed due to co-morbidities. [] Plan just implemented, too soon to assess goals progression <30days   [] Goals require adjustment due to lack of progress  [] Patient is not progressing as expected and requires additional follow up with physician  [] Other    Prognosis for POC: [x] Good [] Fair  [] Poor    Patient requires continued skilled intervention: [x] Yes  [] No    Treatment/Activity Tolerance:  [x] Patient able to complete treatment  [] Patient limited by fatigue  [] Patient limited by pain    [] Patient limited by other medical complications  [] Other:     Return to Play: (if applicable)   []  Stage 1: Intro to Strength   []  Stage 2: Return to Run and Strength   []  Stage 3: Return to Jump and Strength   []  Stage 4: Dynamic Strength and Agility   []  Stage 5: Sport Specific Training     []  Ready to Return to Play, Meets All Above Stages   []  Not Ready for Return to Sports   Comments:                         PLAN: See eval  [x] Continue per plan of care [] Alter current plan (see comments above)  [] Plan of care initiated [] Hold pending MD visit [] Discharge    Electronically signed by:   Landry Wolff, PTA, PTA ; Ina Mccoy PT, MPT,ATC, cert DN  Note: If patient does not return for scheduled/ recommended follow up visits, this note will serve as a discharge from care along with most recent update on progress.

## 2022-03-03 ENCOUNTER — HOSPITAL ENCOUNTER (OUTPATIENT)
Dept: PHYSICAL THERAPY | Age: 62
Setting detail: THERAPIES SERIES
Discharge: HOME | End: 2022-03-03
Payer: COMMERCIAL

## 2022-03-03 PROCEDURE — 97110 THERAPEUTIC EXERCISES: CPT | Performed by: PHYSICAL THERAPY ASSISTANT

## 2022-03-03 PROCEDURE — 97140 MANUAL THERAPY 1/> REGIONS: CPT | Performed by: PHYSICAL THERAPY ASSISTANT

## 2022-03-03 PROCEDURE — 97112 NEUROMUSCULAR REEDUCATION: CPT | Performed by: PHYSICAL THERAPY ASSISTANT

## 2022-03-03 NOTE — FLOWSHEET NOTE
[facility], [address] [place] [address] [place], [address]  Phone: [phone], [phone]    Physical Therapy Treatment Note/ Progress Report:     Date:  3/3/2022    Patient Name:  Nancy Leyva. :  1960  MRN: [id number]  Restrictions/Precautions:    Medical/Treatment Diagnosis Information:  Diagnosis: Left Rotator Cuff Repair on 1/10/22  Treatment Diagnosis: D06.407  Insurance/Certification information:  PT Insurance Information: [employer]  Physician Information:  Referring Practitioner: Suzan Snell MD  Has the plan of care been signed (Y/N):        []  Yes  [x]  No     Date of Patient follow up with Physician: 3/8/2022    Is this a Progress Report:     []  Yes  [x]  No      If Yes:  Date Range for reporting period:  Initial Eval: 2022  Beginnin2022 --- Endin2022  Beginnin22 --- Ending 3/24/22    Progress report will be due (10 Rx or 30 days whichever is less): 3/95/9517    Recertification will be due (POC Duration  / 90 days whichever is less): 2022     Visit # Insurance Allowable Auth Required   In Person 12 24 visits 22-22 []  Yes     []  No    [facility]   []  Yes     []  No    Total 12         Functional Scale: Quick Dash: 73% (Total Number Sum: 43/55)   Date assessed: 2022   Functional Scale: Quick Dash: 32% (Total = 25/55)    22    Latex Allergy:  [x]NO      []YES  Preferred Language for Healthcare:   [x]English       []other:    Pain level: 0/10 currently     SUBJECTIVE: (7 weeks PO on 22) Doing pretty good - went to a  on Tuesday and did a lot of standing so I was sore from that.     OBJECTIVE: See eval   Observation:    Test measurements:      RESTRICTIONS/PRECAUTIONS: HTN, Hx of heart problems                  Exercises/Interventions:   Therapeutic Ex (69032)  Therapeutic Activity (96509)  NMR re-education (12867) Sets/Reps Notes/CUES   Pulley 5 min              Wall slides x20 Tricep extension Blue x30         TB rows  TB ext Green x30  Green x30    TB IR walkout isometrics  TB ER walkout isometrics Red 10x10\"  Red 10x10\"              Elbow flexion AROM x10 ea of 3 way 2#             Cane press 2# x30    Cane flexion 2# x20    Cane ER 10\" x10 90/90 position        Supine punch x30 2#   Supine flexion  x20 To 90   SL ER / SL Flexion / Short lever ABD  x20 / x20 / x20         Bent over row  Bent over ext 2# x20  2# x20              Manual Intervention ([phone])     Shoulder PROM- gentle 10' Flexion to 90°, abd to 90°, IR, ER to neutral                               Medbridge access code: Mayo Sparks  Access Code: Mayo Sparks  URL: [email or web address]  Date: 01/24/2022  Prepared by: Analilia Cherry    Exercises  Seated Scapular Retraction - 1-2 x daily - 7 x weekly - 3 sets - 10 reps - 5 s hold  Seated Shoulder Shrugs - 1-2 x daily - 7 x weekly - 3 sets - 10 reps  Seated Neck Sidebending Stretch - 1-2 x daily - 7 x weekly - 3 reps - 30s hold  Wrist AROM Radial Ulnar Deviation - 1-2 x daily - 7 x weekly - 3 sets - 10 reps  Forearm Strengthening with Ball Squeeze - 1-2 x daily - 7 x weekly - 3 sets - 10 reps  Seated Wrist Extension AROM - 1-2 x daily - 7 x weekly - 3 sets - 10 reps  Wrist Flexion Extension AROM with Fingers Curled and Palm Down - 1-2 x daily - 7 x weekly - 3 sets - 10 reps  Circular Shoulder Pendulum with Table Support - 1-2 x daily - 7 x weekly - 3 sets - 10 reps  Flexion-Extension Shoulder Pendulum with Table Support - 1-2 x daily - 7 x weekly - 3 sets - 10 reps  Seated Elbow Flexion Extension AROM - 1-2 x daily - 7 x weekly - 3 sets - 10 reps                  Patient Education 10'  Pt education with HEP and progression of PT along with compliance with HEP to aide with formal PT for optimal outcomes.       Therapeutic Exercise and NMR EXR  [x] (68058) Provided verbal/tactile cueing for activities related to strengthening, flexibility, endurance, ROM  for improvements in scapular, scapulothoracic and UE control with self care, reaching, carrying, lifting, house/yardwork, driving/computer work. [x] (36201) Provided verbal/tactile cueing for activities related to improving balance, coordination, kinesthetic sense, posture, motor skill, proprioception  to assist with  scapular, scapulothoracic and UE control with self care, reaching, carrying, lifting, house/yardwork, driving/computer work. Therapeutic Activities:    [x] (49558 or 28239) Provided verbal/tactile cueing for activities related to improving balance, coordination, kinesthetic sense, posture, motor skill, proprioception and motor activation to allow for proper function of scapular, scapulothoracic and UE control with self care, carrying, lifting, driving/computer work.      Home Exercise Program:    [x] (30070) Reviewed/Progressed HEP activities related to strengthening, flexibility, endurance, ROM of scapular, scapulothoracic and UE control with self care, reaching, carrying, lifting, house/yardwork, driving/computer work  [x] (27773) Reviewed/Progressed HEP activities related to improving balance, coordination, kinesthetic sense, posture, motor skill, proprioception of scapular, scapulothoracic and UE control with self care, reaching, carrying, lifting, house/yardwork, driving/computer work      Manual Treatments:  PROM / STM / Oscillations-Mobs:  G-I, II, III, IV (PA's, Inf., Post.)  [x] (36754) Provided manual therapy to mobilize soft tissue/joints of cervical/CT, scapular GHJ and UE for the purpose of modulating pain, promoting relaxation,  increasing ROM, reducing/eliminating soft tissue swelling/inflammation/restriction, improving soft tissue extensibility and allowing for proper ROM for normal function with self care, reaching, carrying, lifting, house/yardwork, driving/computer work    Modalities:      Charges:  Timed Code Treatment Minutes: 61'   Total Treatment Minutes:  60'   BWC:  TE TIME:  NMR TIME:  MANUAL TIME:  UNTIMED MINUTES:  Medicare Total:         [] EVAL (LOW) 44051 (typically 20 minutes face-to-face)  [] EVAL (MOD) 91264 (typically 30 minutes face-to-face)  [] EVAL (HIGH) 64911 (typically 45 minutes face-to-face)  [] RE-EVAL     [x] YT(73381) x 2    [] IONTO  [x] NMR (32362) x 1    [] VASO  [x] Manual (53867) x 1    [] Other:  [] TA x      [] Mech Traction (09727)  [] ES(attended) (61654)     [] ES (un) (17252):    ASSESSMENT:  See eval    GOALS:   Short Term Goals: To be achieved in: 2 weeks  1. Independent in HEP and progression per patient tolerance, in order to prevent re-injury. [] Progressing: [x] Met: [] Not Met: [] Adjusted   2. Patient will have a decrease in pain to facilitate improvement in movement, function, and ADLs as indicated by Functional Deficits. [] Progressing: [x] Met: [] Not Met: [] Adjusted     Long Term Goals: To be achieved in: 12 weeks  1. Disability index score of 20% or less for the QuickDash/Oswestry to assist with reaching prior level of function. [x] Progressing: [] Met: [] Not Met: [] Adjusted   2. Patient will demonstrate increased AROM on the left to equal the opposite side bilaterally to allow for ability to reach overhead pain and restriction free as indicated by patients Functional Deficits. [x] Progressing: [] Met: [] Not Met: [] Adjusted   3. Patient will demonstrate an increase in strength of the L UE to 4+/5 to allow for ability to perform all functional activities without restriction as indicated by patients Functional Deficits. [x] Progressing: [] Met: [] Not Met: [] Adjusted   4. Patient will return to all transfers, work activities, and functional activities without increased symptoms or restriction. [x] Progressing: [] Met: [] Not Met: [] Adjusted   5. Patient will have 0/10 pain with ADL's. 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    [x] Progressing: [] Met: [] Not Met: [] Adjusted     Overall Progression Towards Functional goals/ Treatment Progress Update:  [x] Patient is progressing as expected towards functional goals listed. [] Progression is slowed due to complexities/Impairments listed. [] Progression has been slowed due to co-morbidities. [] Plan just implemented, too soon to assess goals progression <30days   [] Goals require adjustment due to lack of progress  [] Patient is not progressing as expected and requires additional follow up with physician  [] Other    Prognosis for POC: [x] Good [] Fair  [] Poor    Patient requires continued skilled intervention: [x] Yes  [] No    Treatment/Activity Tolerance:  [x] Patient able to complete treatment  [] Patient limited by fatigue  [] Patient limited by pain    [] Patient limited by other medical complications  [] Other:     Return to Play: (if applicable)   []  Stage 1: Intro to Strength   []  Stage 2: Return to Run and Strength   []  Stage 3: Return to Jump and Strength   []  Stage 4: Dynamic Strength and Agility   []  Stage 5: Sport Specific Training     []  Ready to Return to Play, Meets All Above Stages   []  Not Ready for Return to Sports   Comments:                         PLAN: See eval  [x] Continue per plan of care [] Alter current plan (see comments above)  [] Plan of care initiated [] Hold pending MD visit [] Discharge    Electronically signed by:   Kaylynn Jacobsen PTA ; Rosibel Lazar PT, MPT,ATC, cert DN  Note: If patient does not return for scheduled/ recommended follow up visits, this note will serve as a discharge from care along with most recent update on progress.

## 2022-03-07 ENCOUNTER — TELEPHONE (OUTPATIENT)
Dept: FAMILY MEDICINE CLINIC | Age: 62
End: 2022-03-07

## 2022-03-07 ENCOUNTER — HOSPITAL ENCOUNTER (OUTPATIENT)
Dept: PHYSICAL THERAPY | Age: 62
Setting detail: THERAPIES SERIES
Discharge: HOME | End: 2022-03-07
Payer: COMMERCIAL

## 2022-03-07 DIAGNOSIS — Z12.5 PROSTATE CANCER SCREENING: ICD-10-CM

## 2022-03-07 DIAGNOSIS — I10 BENIGN ESSENTIAL HTN: Primary | ICD-10-CM

## 2022-03-07 DIAGNOSIS — E78.00 PURE HYPERCHOLESTEROLEMIA: ICD-10-CM

## 2022-03-07 PROCEDURE — 97110 THERAPEUTIC EXERCISES: CPT | Performed by: PHYSICAL THERAPY ASSISTANT

## 2022-03-07 PROCEDURE — 97112 NEUROMUSCULAR REEDUCATION: CPT | Performed by: PHYSICAL THERAPY ASSISTANT

## 2022-03-07 PROCEDURE — 97140 MANUAL THERAPY 1/> REGIONS: CPT | Performed by: PHYSICAL THERAPY ASSISTANT

## 2022-03-07 NOTE — TELEPHONE ENCOUNTER
Pt is scheduled for yearly visit on 3/28 and fasting blood work on 3/24. Please place any blood work orders.

## 2022-03-07 NOTE — FLOWSHEET NOTE
[facility], [address] [place], [address]  Phone: [phone], [phone]  Date: 3/7/2022          Patient Name; :  Ileana Dawkins; 1960   Dx/ICD Code: Diagnosis: Left Rotator Cuff Repair on 1/10/22  Treatment Diagnosis: M75.102        Physician: Michael Ayon MD        Total PT Visits: 13     Measures Previous Current   Pain (0-10) 3/10 1-2/10   Disability % Quick Dash:  50% Quick Dash:  (Sum 18) 16%   ROM  Passive: Flexion 160      IR 37     ER 55   Strength                 Specific Functional Improvements & Impressions:  Micki Wang is making slow steady progress per protocol. He notes he is feeling good. He is just struggling with patience in the time the rehab takes. Plan & Recommendations:  [x] Continue rehabilitation due to objective improvement and continued functional deficits with frequency and duration: 2 times a week for 6-8 weeks  [] Progress toward  []GAP, []Work Conditioning, []Independent HEP   [] Discharge due to   [] All goals achieved, [] Maximized \"medical necessity\" [] No subjective or objective improvements      Electronically signed by:  Darylene Favre, PTA, Leni Bradford PT,MPT,ATC, cert DN  Therapy Plan of Care Re-Certification  This patient has been re-evaluated for physical therapy services and for therapy to continue, Medicare, Medicaid and other insurances require periodic physician review of the treatment plan.  Please review the above re-evaluation and verify that you agree with plan of care as established above by signing the attached document and return it to our office or note changes to established plan below  [] Follow treatment plan as above [] Discontinue physical therapy  [] Change plan to:                                 __________________________________________________    Physician Signature:____________________________________ Date:____________  By signing above, therapists plan is approved by physician    If you have any questions or concerns, please don't hesitate to call. Thank you for your referral.    [facility] Therapy office  ([phone])     Fax [phone]     Physical Therapy Treatment Note/ Progress Report:     Date:  3/7/2022    Patient Name:  Adis Helton :  1960  MRN: [id number]  Restrictions/Precautions:    Medical/Treatment Diagnosis Information:  Diagnosis: Left Rotator Cuff Repair on 1/10/22  Treatment Diagnosis: J78.194  Insurance/Certification information:  PT Insurance Information: Hum[employer]  Physician Information:  Referring Practitioner: Matt Loo MD  Has the plan of care been signed (Y/N):        []  Yes  [x]  No     Date of Patient follow up with Physician: 3/8/2022    Is this a Progress Report:     []  Yes  [x]  No      If Yes:  Date Range for reporting period:  Initial Eval: 2022  Beginnin2022 --- Endin2022  Beginnin22 --- Ending 3/24/22  Beginning: 3/7/22 --- Ending 22        Progress report will be due (10 Rx or 30 days whichever is less): 2921    Recertification will be due (POC Duration  / 90 days whichever is less): 2022     Visit # Insurance Allowable Auth Required   In Person 13 24 visits 22-22 []  Yes     []  No    Tele Health   []  Yes     []  No    Total 13         Functional Scale: Quick Dash: 73% (Total Number Sum: 43/55)   Date assessed: 2022   Functional Scale: Quick Dash: 32% (Total = 25/55)    Date assessed:  2022  Functional Scale: Quick Dash: 16% (Total = 18/55)    Date assessed:   3/7/2022        Latex Allergy:  [x]NO      []YES      Preferred Language for Healthcare:   [x]English       []other:    Pain level: 0/10 currently     SUBJECTIVE: (8 weeks PO on 22) Doing well. Did well over the weekend.     OBJECTIVE: See eval   Observation:    Test measurements:      RESTRICTIONS/PRECAUTIONS: HTN, Hx of heart problems                   Exercises/Interventions:   Therapeutic Ex (42464)  Therapeutic Activity (29196)  NMR re-education (03587) Sets/Reps Notes/CUES   Pulley 5 min              Wall slides x20         Tricep extension Blue x30         TB rows  TB ext Green x30  Green x30    TB IR walkout isometrics  TB ER walkout isometrics Red 10x10\"  Red 10x10\"              Elbow flexion AROM x15 ea of 3 way 2#             Cane press 2# x30    Cane flexion 2# x20    Cane ER 10\" x10 90/90 position        Supine punch x30 2#   Supine flexion  x20 To 90   SL ER / SL Flexion / Short lever ABD  x20 / x20 / x20         Bent over row  Bent over ext 2# x20  2# x20              Manual Intervention ([id number])     Shoulder PROM- gentle 10' Flexion to 90°, abd to 90°, IR, ER to neutral                               Medbridge access code: Cece Lomax  Access Code: Cece Lomax  URL: [email or web address]  Date: 01/24/2022  Prepared by: Henrik Zimmer    Exercises  Seated Scapular Retraction - 1-2 x daily - 7 x weekly - 3 sets - 10 reps - 5 s hold  Seated Shoulder Shrugs - 1-2 x daily - 7 x weekly - 3 sets - 10 reps  Seated Neck Sidebending Stretch - 1-2 x daily - 7 x weekly - 3 reps - 30s hold  Wrist AROM Radial Ulnar Deviation - 1-2 x daily - 7 x weekly - 3 sets - 10 reps  Forearm Strengthening with Ball Squeeze - 1-2 x daily - 7 x weekly - 3 sets - 10 reps  Seated Wrist Extension AROM - 1-2 x daily - 7 x weekly - 3 sets - 10 reps  Wrist Flexion Extension AROM with Fingers Curled and Palm Down - 1-2 x daily - 7 x weekly - 3 sets - 10 reps  Circular Shoulder Pendulum with Table Support - 1-2 x daily - 7 x weekly - 3 sets - 10 reps  Flexion-Extension Shoulder Pendulum with Table Support - 1-2 x daily - 7 x weekly - 3 sets - 10 reps  Seated Elbow Flexion Extension AROM - 1-2 x daily - 7 x weekly - 3 sets - 10 reps                  Patient Education 10'  Pt education with HEP and progression of PT along with compliance with HEP to aide with formal PT for optimal outcomes.       Therapeutic Exercise and NMR EXR  [x] (19050) Provided verbal/tactile cueing for activities related to strengthening, flexibility, endurance, ROM  for improvements in scapular, scapulothoracic and UE control with self care, reaching, carrying, lifting, house/yardwork, driving/computer work. [x] (16619) Provided verbal/tactile cueing for activities related to improving balance, coordination, kinesthetic sense, posture, motor skill, proprioception  to assist with  scapular, scapulothoracic and UE control with self care, reaching, carrying, lifting, house/yardwork, driving/computer work. Therapeutic Activities:    [x] (34895 or 84835) Provided verbal/tactile cueing for activities related to improving balance, coordination, kinesthetic sense, posture, motor skill, proprioception and motor activation to allow for proper function of scapular, scapulothoracic and UE control with self care, carrying, lifting, driving/computer work.      Home Exercise Program:    [x] (91472) Reviewed/Progressed HEP activities related to strengthening, flexibility, endurance, ROM of scapular, scapulothoracic and UE control with self care, reaching, carrying, lifting, house/yardwork, driving/computer work  [x] (53647) Reviewed/Progressed HEP activities related to improving balance, coordination, kinesthetic sense, posture, motor skill, proprioception of scapular, scapulothoracic and UE control with self care, reaching, carrying, lifting, house/yardwork, driving/computer work      Manual Treatments:  PROM / STM / Oscillations-Mobs:  G-I, II, III, IV (PA's, Inf., Post.)  [x] (23931) Provided manual therapy to mobilize soft tissue/joints of cervical/CT, scapular GHJ and UE for the purpose of modulating pain, promoting relaxation,  increasing ROM, reducing/eliminating soft tissue swelling/inflammation/restriction, improving soft tissue extensibility and allowing for proper ROM for normal function with self care, reaching, carrying, lifting, house/yardwork, driving/computer work    Modalities:      Charges:  Timed Code Treatment Minutes: 61'   Total Treatment Minutes:  60'   BWC:  TE TIME:  NMR TIME:  MANUAL TIME:  UNTIMED MINUTES:  Medicare Total:         [] EVAL (LOW) 01804 (typically 20 minutes face-to-face)  [] EVAL (MOD) 61903 (typically 30 minutes face-to-face)  [] EVAL (HIGH) 49708 (typically 45 minutes face-to-face)  [] RE-EVAL     [x] NZ(96922) x 2    [] IONTO  [x] NMR (36975) x 1    [] VASO  [x] Manual (93150) x 1    [] Other:  [] TA x      [] Mech Traction (04461)  [] ES(attended) (10882)     [] ES (un) (48755):    ASSESSMENT:  See eval    GOALS:   Short Term Goals: To be achieved in: 2 weeks  1. Independent in HEP and progression per patient tolerance, in order to prevent re-injury. [] Progressing: [x] Met: [] Not Met: [] Adjusted   2. Patient will have a decrease in pain to facilitate improvement in movement, function, and ADLs as indicated by Functional Deficits. [] Progressing: [x] Met: [] Not Met: [] Adjusted     Long Term Goals: To be achieved in: 12 weeks  1. Disability index score of 20% or less for the QuickDash/Oswestry to assist with reaching prior level of function. [] Progressing: [x] Met: [] Not Met: [] Adjusted   2. Patient will demonstrate increased AROM on the left to equal the opposite side bilaterally to allow for ability to reach overhead pain and restriction free as indicated by patients Functional Deficits. [x] Progressing: [] Met: [] Not Met: [] Adjusted   3. Patient will demonstrate an increase in strength of the L UE to 4+/5 to allow for ability to perform all functional activities without restriction as indicated by patients Functional Deficits. [x] Progressing: [] Met: [] Not Met: [] Adjusted   4. Patient will return to all transfers, work activities, and functional activities without increased symptoms or restriction. [x] Progressing: [] Met: [] Not Met: [] Adjusted   5. Patient will have 0/10 pain with ADL's.   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 [x] Progressing: [] Met: [] Not Met: [] Adjusted     Overall Progression Towards Functional goals/ Treatment Progress Update:  [x] Patient is progressing as expected towards functional goals listed. [] Progression is slowed due to complexities/Impairments listed. [] Progression has been slowed due to co-morbidities. [] Plan just implemented, too soon to assess goals progression <30days   [] Goals require adjustment due to lack of progress  [] Patient is not progressing as expected and requires additional follow up with physician  [] Other    Prognosis for POC: [x] Good [] Fair  [] Poor    Patient requires continued skilled intervention: [x] Yes  [] No    Treatment/Activity Tolerance:  [x] Patient able to complete treatment  [] Patient limited by fatigue  [] Patient limited by pain    [] Patient limited by other medical complications  [] Other:     Return to Play: (if applicable)   []  Stage 1: Intro to Strength   []  Stage 2: Return to Run and Strength   []  Stage 3: Return to Jump and Strength   []  Stage 4: Dynamic Strength and Agility   []  Stage 5: Sport Specific Training     []  Ready to Return to Play, Meets All Above Stages   []  Not Ready for Return to Sports   Comments:                         PLAN: See eval  [x] Continue per plan of care [] Alter current plan (see comments above)  [] Plan of care initiated [] Hold pending MD visit [] Discharge    Electronically signed by:   Hettie Homans, PTA  Note: If patient does not return for scheduled/ recommended follow up visits, this note will serve as a discharge from care along with most recent update on progress.

## 2022-03-09 ENCOUNTER — HOSPITAL ENCOUNTER (OUTPATIENT)
Dept: PHYSICAL THERAPY | Age: 62
Setting detail: THERAPIES SERIES
Discharge: HOME | End: 2022-03-09
Payer: COMMERCIAL

## 2022-03-09 PROCEDURE — 97110 THERAPEUTIC EXERCISES: CPT | Performed by: PHYSICAL THERAPY ASSISTANT

## 2022-03-09 PROCEDURE — 97140 MANUAL THERAPY 1/> REGIONS: CPT | Performed by: PHYSICAL THERAPY ASSISTANT

## 2022-03-09 NOTE — FLOWSHEET NOTE
re-education (44461) Sets/Reps Notes/CUES   Pulley 5 min              Wall slides x20         Ball on wall  X20 4 way B yellowSB     Wall push up  x20              Tricep extension Blue x30         TB rows  TB ext Green x30  Green x30    TB IR walkout isometrics  TB ER walkout isometrics Red 10x10\"  Red 10x10\"              Elbow flexion AROM x15 ea of 3 way 2#             Cane press 2# x30    Cane flexion 2# x20    Cane ER 10\" x10 90/90 position        Supine punch x30 2#   Supine flexion  x20    SL ER / SL Flexion / Short lever ABD  1# x20 /1# x20 / x20         Bent over row  Bent over ext 2# x30  2# x20              Manual Intervention (62109)     Shoulder PROM- gentle 10' Flexion to 90°, abd to 90°, IR, ER to neutral                               Medbridge access code: Massiel Cardozo  Access Code: Massiel Cardozo  URL: [email or web address]  Date: 01/24/2022  Prepared by: Mario Mora    Exercises  Seated Scapular Retraction - 1-2 x daily - 7 x weekly - 3 sets - 10 reps - 5 s hold  Seated Shoulder Shrugs - 1-2 x daily - 7 x weekly - 3 sets - 10 reps  Seated Neck Sidebending Stretch - 1-2 x daily - 7 x weekly - 3 reps - 30s hold  Wrist AROM Radial Ulnar Deviation - 1-2 x daily - 7 x weekly - 3 sets - 10 reps  Forearm Strengthening with Ball Squeeze - 1-2 x daily - 7 x weekly - 3 sets - 10 reps  Seated Wrist Extension AROM - 1-2 x daily - 7 x weekly - 3 sets - 10 reps  Wrist Flexion Extension AROM with Fingers Curled and Palm Down - 1-2 x daily - 7 x weekly - 3 sets - 10 reps  Circular Shoulder Pendulum with Table Support - 1-2 x daily - 7 x weekly - 3 sets - 10 reps  Flexion-Extension Shoulder Pendulum with Table Support - 1-2 x daily - 7 x weekly - 3 sets - 10 reps  Seated Elbow Flexion Extension AROM - 1-2 x daily - 7 x weekly - 3 sets - 10 reps                  Patient Education 10'  Pt education with HEP and progression of PT along with compliance with HEP to aide with formal PT for optimal outcomes. Therapeutic Exercise and NMR EXR  [x] (87222) Provided verbal/tactile cueing for activities related to strengthening, flexibility, endurance, ROM  for improvements in scapular, scapulothoracic and UE control with self care, reaching, carrying, lifting, house/yardwork, driving/computer work. [x] (18191) Provided verbal/tactile cueing for activities related to improving balance, coordination, kinesthetic sense, posture, motor skill, proprioception  to assist with  scapular, scapulothoracic and UE control with self care, reaching, carrying, lifting, house/yardwork, driving/computer work. Therapeutic Activities:    [x] (80864 or 37626) Provided verbal/tactile cueing for activities related to improving balance, coordination, kinesthetic sense, posture, motor skill, proprioception and motor activation to allow for proper function of scapular, scapulothoracic and UE control with self care, carrying, lifting, driving/computer work.      Home Exercise Program:    [x] (97642) Reviewed/Progressed HEP activities related to strengthening, flexibility, endurance, ROM of scapular, scapulothoracic and UE control with self care, reaching, carrying, lifting, house/yardwork, driving/computer work  [x] (90288) Reviewed/Progressed HEP activities related to improving balance, coordination, kinesthetic sense, posture, motor skill, proprioception of scapular, scapulothoracic and UE control with self care, reaching, carrying, lifting, house/yardwork, driving/computer work      Manual Treatments:  PROM / STM / Oscillations-Mobs:  G-I, II, III, IV (PA's, Inf., Post.)  [x] (70468) Provided manual therapy to mobilize soft tissue/joints of cervical/CT, scapular GHJ and UE for the purpose of modulating pain, promoting relaxation,  increasing ROM, reducing/eliminating soft tissue swelling/inflammation/restriction, improving soft tissue extensibility and allowing for proper ROM for normal function with self care, reaching, carrying, lifting, house/yardwork, driving/computer work    Modalities:      Charges:  Timed Code Treatment Minutes: 39'   Total Treatment Minutes:  45'   [facility]:  [facility] TIME:  MANUAL TIME:  UNTIMED MINUTES:  Medicare Total:         [] EVAL (LOW) 67638 (typically 20 minutes face-to-face)  [] EVAL (MOD) 49038 (typically 30 minutes face-to-face)  [] EVAL (HIGH) 57155 (typically 45 minutes face-to-face)  [] RE-EVAL     [x] FC(20441) x 2    [] IONTO  [] NMR (29761) x   [] VASO  [x] Manual (94399) x 1    [] Other:  [] TA x      [] Mech Traction (63635)  [] ES(attended) (11792)     [] ES (un) (75006):    ASSESSMENT:  See eval    GOALS:   Short Term Goals: To be achieved in: 2 weeks  1. Independent in HEP and progression per patient tolerance, in order to prevent re-injury. [] Progressing: [x] Met: [] Not Met: [] Adjusted   2. Patient will have a decrease in pain to facilitate improvement in movement, function, and ADLs as indicated by Functional Deficits. [] Progressing: [x] Met: [] Not Met: [] Adjusted     Long Term Goals: To be achieved in: 12 weeks  1. Disability index score of 20% or less for the QuickDash/Oswestry to assist with reaching prior level of function. [] Progressing: [x] Met: [] Not Met: [] Adjusted   2. Patient will demonstrate increased AROM on the left to equal the opposite side bilaterally to allow for ability to reach overhead pain and restriction free as indicated by patients Functional Deficits. [x] Progressing: [] Met: [] Not Met: [] Adjusted   3. Patient will demonstrate an increase in strength of the L UE to 4+/5 to allow for ability to perform all functional activities without restriction as indicated by patients Functional Deficits. [x] Progressing: [] Met: [] Not Met: [] Adjusted   4. Patient will return to all transfers, work activities, and functional activities without increased symptoms or restriction. [x] Progressing: [] Met: [] Not Met: [] Adjusted   5.  Patient will have 0/10 pain with ADL's. 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 [x] Progressing: [] Met: [] Not Met: [] Adjusted     Overall Progression Towards Functional goals/ Treatment Progress Update:  [x] Patient is progressing as expected towards functional goals listed. [] Progression is slowed due to complexities/Impairments listed. [] Progression has been slowed due to co-morbidities. [] Plan just implemented, too soon to assess goals progression <30days   [] Goals require adjustment due to lack of progress  [] Patient is not progressing as expected and requires additional follow up with physician  [] Other    Prognosis for POC: [x] Good [] Fair  [] Poor    Patient requires continued skilled intervention: [x] Yes  [] No    Treatment/Activity Tolerance:  [x] Patient able to complete treatment  [] Patient limited by fatigue  [] Patient limited by pain    [] Patient limited by other medical complications  [] Other:     Return to Play: (if applicable)   []  Stage 1: Intro to Strength   []  Stage 2: Return to Run and Strength   []  Stage 3: Return to Jump and Strength   []  Stage 4: Dynamic Strength and Agility   []  Stage 5: Sport Specific Training     []  Ready to Return to Play, Meets All Above Stages   []  Not Ready for Return to Sports   Comments:                         PLAN: See eval  [x] Continue per plan of care [] Alter current plan (see comments above)  [] Plan of care initiated [] Hold pending MD visit [] Discharge    Electronically signed by:   Brad Stanton PTA  Note: If patient does not return for scheduled/ recommended follow up visits, this note will serve as a discharge from care along with most recent update on progress.

## 2022-03-14 ENCOUNTER — HOSPITAL ENCOUNTER (OUTPATIENT)
Dept: PHYSICAL THERAPY | Age: 62
Setting detail: THERAPIES SERIES
Discharge: HOME | End: 2022-03-14
Payer: COMMERCIAL

## 2022-03-14 PROCEDURE — 97110 THERAPEUTIC EXERCISES: CPT | Performed by: PHYSICAL THERAPY ASSISTANT

## 2022-03-14 PROCEDURE — 97112 NEUROMUSCULAR REEDUCATION: CPT | Performed by: PHYSICAL THERAPY ASSISTANT

## 2022-03-14 PROCEDURE — 97140 MANUAL THERAPY 1/> REGIONS: CPT | Performed by: PHYSICAL THERAPY ASSISTANT

## 2022-03-14 NOTE — FLOWSHEET NOTE
[facility], [address] [place] [address] Dorina Banguraus, [facility]  Phone: [phone], [phone]    Physical Therapy Treatment Note/ Progress Report:     Date:  3/14/2022    Patient Name:  Eldon Lazaro. :  1960  MRN: [id number]  Restrictions/Precautions:    Medical/Treatment Diagnosis Information:  Diagnosis: Left Rotator Cuff Repair on 1/10/22  Treatment Diagnosis: T33.775  Insurance/Certification information:  PT Insurance Information: [employer]  Physician Information:  Referring Practitioner: Nilton Brown MD  Has the plan of care been signed (Y/N):        []  Yes  [x]  No     Date of Patient follow up with Physician: 3/8/2022    Is this a Progress Report:     []  Yes  [x]  No      If Yes:  Date Range for reporting period:  Initial Eval: 2022  Beginnin2022 --- Endin2022  Beginnin22 --- Ending 3/24/22  Beginning: 3/7/22 --- Ending 22        Progress report will be due (10 Rx or 30 days whichever is less): 4680    Recertification will be due (POC Duration  / 90 days whichever is less): 2022     Visit # Insurance Allowable Auth Required   In Person 15 24 visits 22-22 []  Yes     []  No    Tele Health   []  Yes     []  No    Total 15         Functional Scale: Quick Dash: 73% (Total Number Sum: 43/55)   Date assessed: 2022   Functional Scale: Quick Dash: 32% (Total = 25/55)    Date assessed:  2022  Functional Scale: Quick Dash: 16% (Total = 18/55)    Date assessed:   3/7/2022        Latex Allergy:  [x]NO      []YES      Preferred Language for Healthcare:   [x]English       []other:    Pain level: 0/10 currently     SUBJECTIVE: (9 weeks PO on 3/7/22)   Doing well. Just soreness in the shoulder.     OBJECTIVE: See eval   Observation:    Test measurements:      RESTRICTIONS/PRECAUTIONS: HTN, Hx of heart problems                   Exercises/Interventions:   Therapeutic Ex (72418)  Therapeutic Activity (52469)  NMR re-education (84099) Sets/Reps Notes/CUES   Pulley 5 min    Towel IR stretch  10x10\"    Doorway ER stretch  10x10\"    Crossover stretch  10x10\"    Wall slides 10x10\"         Ball on wall  X20 4 way B yellowSB     Wall push up  x20    PRE Flexion / scaption  X20 /     Flexion with ecc lower  x20    Tricep extension Blue x30         TB rows  TB ext Green x30  Green x30    TB IR walkout isometrics  TB ER walkout isometrics Red 10x10\"  Red 10x10\"              Elbow flexion AROM x15 ea of 3 way 2#             Cane press 2# x30    Cane flexion 2# x20    Cane ER 10\" x10 90/90 position        Supine punch x30 2#   Supine flexion  x20    SL ER / SL Flexion / Short lever ABD  1# x20 /1# x20 / x20         Bent over row  Bent over ext 2# x30  2# x20              Manual Intervention (61291)     Shoulder PROM- gentle 10' Flexion to 90°, abd to 90°, IR, ER to neutral                               Medbridge access code: Andressa Briggs  Access Code: Andressa Briggs  URL: [email or web address]  Date: 01/24/2022  Prepared by: Mike Mcdonald    Exercises  Seated Scapular Retraction - 1-2 x daily - 7 x weekly - 3 sets - 10 reps - 5 s hold  Seated Shoulder Shrugs - 1-2 x daily - 7 x weekly - 3 sets - 10 reps  Seated Neck Sidebending Stretch - 1-2 x daily - 7 x weekly - 3 reps - 30s hold  Wrist AROM Radial Ulnar Deviation - 1-2 x daily - 7 x weekly - 3 sets - 10 reps  Forearm Strengthening with Ball Squeeze - 1-2 x daily - 7 x weekly - 3 sets - 10 reps  Seated Wrist Extension AROM - 1-2 x daily - 7 x weekly - 3 sets - 10 reps  Wrist Flexion Extension AROM with Fingers Curled and Palm Down - 1-2 x daily - 7 x weekly - 3 sets - 10 reps  Circular Shoulder Pendulum with Table Support - 1-2 x daily - 7 x weekly - 3 sets - 10 reps  Flexion-Extension Shoulder Pendulum with Table Support - 1-2 x daily - 7 x weekly - 3 sets - 10 reps  Seated Elbow Flexion Extension AROM - 1-2 x daily - 7 x weekly - 3 sets - 10 reps                  Patient Education 10' Pt education with HEP and progression of PT along with compliance with HEP to aide with formal PT for optimal outcomes. Therapeutic Exercise and NMR EXR  [x] (98159) Provided verbal/tactile cueing for activities related to strengthening, flexibility, endurance, ROM  for improvements in scapular, scapulothoracic and UE control with self care, reaching, carrying, lifting, house/yardwork, driving/computer work. [x] (73748) Provided verbal/tactile cueing for activities related to improving balance, coordination, kinesthetic sense, posture, motor skill, proprioception  to assist with  scapular, scapulothoracic and UE control with self care, reaching, carrying, lifting, house/yardwork, driving/computer work. Therapeutic Activities:    [x] (48606 or 03981) Provided verbal/tactile cueing for activities related to improving balance, coordination, kinesthetic sense, posture, motor skill, proprioception and motor activation to allow for proper function of scapular, scapulothoracic and UE control with self care, carrying, lifting, driving/computer work.      Home Exercise Program:    [x] (29371) Reviewed/Progressed HEP activities related to strengthening, flexibility, endurance, ROM of scapular, scapulothoracic and UE control with self care, reaching, carrying, lifting, house/yardwork, driving/computer work  [x] (26696) Reviewed/Progressed HEP activities related to improving balance, coordination, kinesthetic sense, posture, motor skill, proprioception of scapular, scapulothoracic and UE control with self care, reaching, carrying, lifting, house/yardwork, driving/computer work      Manual Treatments:  PROM / STM / Oscillations-Mobs:  G-I, II, III, IV (PA's, Inf., Post.)  [x] (52721) Provided manual therapy to mobilize soft tissue/joints of cervical/CT, scapular GHJ and UE for the purpose of modulating pain, promoting relaxation,  increasing ROM, reducing/eliminating soft tissue swelling/inflammation/restriction, improving soft tissue extensibility and allowing for proper ROM for normal function with self care, reaching, carrying, lifting, house/yardwork, driving/computer work    Modalities:      Charges:  Timed Code Treatment Minutes: 54'   Total Treatment Minutes:  55'   [facility]:  [facility] TIME:  MANUAL TIME:  UNTIMED MINUTES:  Medicare Total:         [] EVAL (LOW) 63813 (typically 20 minutes face-to-face)  [] EVAL (MOD) 18329 (typically 30 minutes face-to-face)  [] EVAL (HIGH) 79866 (typically 45 minutes face-to-face)  [] RE-EVAL     [x] (15265) x 2    [] IONTO  [x] NMR (02302) x 1  [] VASO  [x] Manual (35998) x 1    [] Other:  [] TA x      [] Mech Traction (48767)  [] ES(attended) (43950)     [] ES (un) (51395):    ASSESSMENT:  See eval    GOALS:   Short Term Goals: To be achieved in: 2 weeks  1. Independent in HEP and progression per patient tolerance, in order to prevent re-injury. [] Progressing: [x] Met: [] Not Met: [] Adjusted   2. Patient will have a decrease in pain to facilitate improvement in movement, function, and ADLs as indicated by Functional Deficits. [] Progressing: [x] Met: [] Not Met: [] Adjusted     Long Term Goals: To be achieved in: 12 weeks  1. Disability index score of 20% or less for the QuickDash/Oswestry to assist with reaching prior level of function. [] Progressing: [x] Met: [] Not Met: [] Adjusted   2. Patient will demonstrate increased AROM on the left to equal the opposite side bilaterally to allow for ability to reach overhead pain and restriction free as indicated by patients Functional Deficits. [x] Progressing: [] Met: [] Not Met: [] Adjusted   3. Patient will demonstrate an increase in strength of the L UE to 4+/5 to allow for ability to perform all functional activities without restriction as indicated by patients Functional Deficits. [x] Progressing: [] Met: [] Not Met: [] Adjusted   4.  Patient will return to all transfers, work activities, and functional activities without increased symptoms or restriction. [x] Progressing: [] Met: [] Not Met: [] Adjusted   5. Patient will have 0/10 pain with ADL's. 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 [x] Progressing: [] Met: [] Not Met: [] Adjusted     Overall Progression Towards Functional goals/ Treatment Progress Update:  [x] Patient is progressing as expected towards functional goals listed. [] Progression is slowed due to complexities/Impairments listed. [] Progression has been slowed due to co-morbidities. [] Plan just implemented, too soon to assess goals progression <30days   [] Goals require adjustment due to lack of progress  [] Patient is not progressing as expected and requires additional follow up with physician  [] Other    Prognosis for POC: [x] Good [] Fair  [] Poor    Patient requires continued skilled intervention: [x] Yes  [] No    Treatment/Activity Tolerance:  [x] Patient able to complete treatment  [] Patient limited by fatigue  [] Patient limited by pain    [] Patient limited by other medical complications  [] Other:     Return to Play: (if applicable)   []  Stage 1: Intro to Strength   []  Stage 2: Return to Run and Strength   []  Stage 3: Return to Jump and Strength   []  Stage 4: Dynamic Strength and Agility   []  Stage 5: Sport Specific Training     []  Ready to Return to Play, Meets All Above Stages   []  Not Ready for Return to Sports   Comments:                         PLAN: See eval  [x] Continue per plan of care [] Alter current plan (see comments above)  [] Plan of care initiated [] Hold pending MD visit [] Discharge    Electronically signed by:   Juan Chadwick PTA  Note: If patient does not return for scheduled/ recommended follow up visits, this note will serve as a discharge from care along with most recent update on progress.

## 2022-03-18 ENCOUNTER — HOSPITAL ENCOUNTER (OUTPATIENT)
Dept: PHYSICAL THERAPY | Age: 62
Setting detail: THERAPIES SERIES
Discharge: HOME | End: 2022-03-18
Payer: COMMERCIAL

## 2022-03-18 PROCEDURE — 97112 NEUROMUSCULAR REEDUCATION: CPT | Performed by: PHYSICAL THERAPY ASSISTANT

## 2022-03-18 PROCEDURE — 97110 THERAPEUTIC EXERCISES: CPT | Performed by: PHYSICAL THERAPY ASSISTANT

## 2022-03-18 PROCEDURE — 97140 MANUAL THERAPY 1/> REGIONS: CPT | Performed by: PHYSICAL THERAPY ASSISTANT

## 2022-03-18 NOTE — FLOWSHEET NOTE
Formerly [facility], [address] [place] [address] Cynthia [place]ura, [address]  Phone: [phone], [phone]    Physical Therapy Treatment Note/ Progress Report:     Date:  3/18/2022    Patient Name:  Santi Johns :  1960  MRN: [id number]  Restrictions/Precautions:    Medical/Treatment Diagnosis Information:  Diagnosis: Left Rotator Cuff Repair on 1/10/22  Treatment Diagnosis: L81.383  Insurance/Certification information:  PT Insurance Information: [employer]  Physician Information:  Referring Practitioner: Maricarmen Whitehead MD  Has the plan of care been signed (Y/N):        []  Yes  [x]  No     Date of Patient follow up with Physician: 3/8/2022    Is this a Progress Report:     []  Yes  [x]  No      If Yes:  Date Range for reporting period:  Initial Eval: 2022  Beginnin2022 --- Endin2022  Beginnin22 --- Ending 3/24/22  Beginning: 3/7/22 --- Ending 22        Progress report will be due (10 Rx or 30 days whichever is less):     Recertification will be due (POC Duration  / 90 days whichever is less): 2022     Visit # Insurance Allowable Auth Required   In Person 16 24 visits 22-22 []  Yes     []  No    Tele Health   []  Yes     []  No    Total 16         Functional Scale: Quick Dash: 73% (Total Number Sum: 43/55)   Date assessed: 2022   Functional Scale: Quick Dash: 32% (Total = 25/55)    Date assessed:  2022  Functional Scale: Quick Dash: 16% (Total = 18/55)    Date assessed:   3/7/2022        Latex Allergy:  [x]NO      []YES      Preferred Language for Healthcare:   [x]English       []other:    Pain level: 0/10 currently     SUBJECTIVE: (9 weeks PO on 3/7/22)    Doing well.    OBJECTIVE: See eval   Observation:    Test measurements:      RESTRICTIONS/PRECAUTIONS: HTN, Hx of heart problems                   Exercises/Interventions:   Therapeutic Ex (80805)  Therapeutic Activity (92498)  NMR re-education (65420) Sets/Reps Notes/CUES   Martin 5 min    Towel IR stretch  10x10\"    Doorway ER stretch  10x10\"    Crossover stretch  10x10\"    Wall slides 10x10\"         Ball on wall  X20 4 way B yellowSB     Wall push up  x20    PRE Flexion / scaption  X20 /     Flexion with ecc lower  x20    Tricep extension Blue x30         TB rows  TB ext Green x30  Green x30    TB IR walkout isometrics  TB ER walkout isometrics Red 10x10\"  Red 10x10\"              Elbow flexion AROM x15 ea of 3 way 2#             Cane press 2# x30    Cane flexion 2# x20    Cane ER 10\" x10 90/90 position        Supine punch x30 2#   Supine flexion  2# x20    SL ER / SL Flexion / Short lever ABD  1# x20 /1# x20 / x20         Bent over row  Bent over ext 2# x30  2# x20              Manual Intervention (65356)     Shoulder PROM- gentle 10' Flexion to 90°, abd to 90°, IR, ER to neutral                               [email or web address] access code: Yael Olivier  Access Code: Yael Olivier  URL: [email or web address]  Date: 01/24/2022  Prepared by: [employer]    Exercises  Seated Scapular Retraction - 1-2 x daily - 7 x weekly - 3 sets - 10 reps - 5 s hold  Seated Shoulder Shrugs - 1-2 x daily - 7 x weekly - 3 sets - 10 reps  Seated Neck Sidebending Stretch - 1-2 x daily - 7 x weekly - 3 reps - 30s hold  Wrist AROM Radial Ulnar Deviation - 1-2 x daily - 7 x weekly - 3 sets - 10 reps  Forearm Strengthening with Ball Squeeze - 1-2 x daily - 7 x weekly - 3 sets - 10 reps  Seated Wrist Extension AROM - 1-2 x daily - 7 x weekly - 3 sets - 10 reps  Wrist Flexion Extension AROM with Fingers Curled and Palm Down - 1-2 x daily - 7 x weekly - 3 sets - 10 reps  Circular Shoulder Pendulum with Table Support - 1-2 x daily - 7 x weekly - 3 sets - 10 reps  Flexion-Extension Shoulder Pendulum with Table Support - 1-2 x daily - 7 x weekly - 3 sets - 10 reps  Seated Elbow Flexion Extension AROM - 1-2 x daily - 7 x weekly - 3 sets - 10 reps                  Patient Education 10'  Pt education with HEP and progression of PT along with compliance with HEP to aide with formal PT for optimal outcomes. Therapeutic Exercise and NMR EXR  [x] (76547) Provided verbal/tactile cueing for activities related to strengthening, flexibility, endurance, ROM  for improvements in scapular, scapulothoracic and UE control with self care, reaching, carrying, lifting, house/yardwork, driving/computer work. [x] (52639) Provided verbal/tactile cueing for activities related to improving balance, coordination, kinesthetic sense, posture, motor skill, proprioception  to assist with  scapular, scapulothoracic and UE control with self care, reaching, carrying, lifting, house/yardwork, driving/computer work. Therapeutic Activities:    [x] (88082 or 53059) Provided verbal/tactile cueing for activities related to improving balance, coordination, kinesthetic sense, posture, motor skill, proprioception and motor activation to allow for proper function of scapular, scapulothoracic and UE control with self care, carrying, lifting, driving/computer work.      Home Exercise Program:    [x] (19581) Reviewed/Progressed HEP activities related to strengthening, flexibility, endurance, ROM of scapular, scapulothoracic and UE control with self care, reaching, carrying, lifting, house/yardwork, driving/computer work  [x] (35613) Reviewed/Progressed HEP activities related to improving balance, coordination, kinesthetic sense, posture, motor skill, proprioception of scapular, scapulothoracic and UE control with self care, reaching, carrying, lifting, house/yardwork, driving/computer work      Manual Treatments:  PROM / STM / Oscillations-Mobs:  G-I, II, III, IV (PA's, Inf., Post.)  [x] (86663) Provided manual therapy to mobilize soft tissue/joints of cervical/CT, scapular GHJ and UE for the purpose of modulating pain, promoting relaxation,  increasing ROM, reducing/eliminating soft tissue swelling/inflammation/restriction, improving soft tissue extensibility and allowing for proper ROM for normal function with self care, reaching, carrying, lifting, house/yardwork, driving/computer work    Modalities:      Charges:  Timed Code Treatment Minutes: 54'   Total Treatment Minutes:  55'   [facility]:  [facility] TIME:  MANUAL TIME:  UNTIMED MINUTES:  Medicare Total:         [] EVAL (LOW) 46788 (typically 20 minutes face-to-face)  [] EVAL (MOD) 90311 (typically 30 minutes face-to-face)  [] EVAL (HIGH) 82675 (typically 45 minutes face-to-face)  [] RE-EVAL     [x] AC(72807) x 2    [] IONTO  [x] NMR (25345) x 1  [] VASO  [x] Manual (09461) x 1    [] Other:  [] TA x      [] Mech Traction (18830)  [] ES(attended) (52262)     [] ES (un) (69823):    ASSESSMENT:  See eval    GOALS:   Short Term Goals: To be achieved in: 2 weeks  1. Independent in HEP and progression per patient tolerance, in order to prevent re-injury. [] Progressing: [x] Met: [] Not Met: [] Adjusted   2. Patient will have a decrease in pain to facilitate improvement in movement, function, and ADLs as indicated by Functional Deficits. [] Progressing: [x] Met: [] Not Met: [] Adjusted     Long Term Goals: To be achieved in: 12 weeks  1. Disability index score of 20% or less for the QuickDash/Oswestry to assist with reaching prior level of function. [] Progressing: [x] Met: [] Not Met: [] Adjusted   2. Patient will demonstrate increased AROM on the left to equal the opposite side bilaterally to allow for ability to reach overhead pain and restriction free as indicated by patients Functional Deficits. [x] Progressing: [] Met: [] Not Met: [] Adjusted   3. Patient will demonstrate an increase in strength of the L UE to 4+/5 to allow for ability to perform all functional activities without restriction as indicated by patients Functional Deficits. [x] Progressing: [] Met: [] Not Met: [] Adjusted   4.  Patient will return to all transfers, work activities, and functional activities without increased symptoms or restriction. [x] Progressing: [] Met: [] Not Met: [] Adjusted   5. Patient will have 0/10 pain with ADL's. 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 [x] Progressing: [] Met: [] Not Met: [] Adjusted     Overall Progression Towards Functional goals/ Treatment Progress Update:  [x] Patient is progressing as expected towards functional goals listed. [] Progression is slowed due to complexities/Impairments listed. [] Progression has been slowed due to co-morbidities. [] Plan just implemented, too soon to assess goals progression <30days   [] Goals require adjustment due to lack of progress  [] Patient is not progressing as expected and requires additional follow up with physician  [] Other    Prognosis for POC: [x] Good [] Fair  [] Poor    Patient requires continued skilled intervention: [x] Yes  [] No    Treatment/Activity Tolerance:  [x] Patient able to complete treatment  [] Patient limited by fatigue  [] Patient limited by pain    [] Patient limited by other medical complications  [] Other:     Return to Play: (if applicable)   []  Stage 1: Intro to Strength   []  Stage 2: Return to Run and Strength   []  Stage 3: Return to Jump and Strength   []  Stage 4: Dynamic Strength and Agility   []  Stage 5: Sport Specific Training     []  Ready to Return to Play, Meets All Above Stages   []  Not Ready for Return to Sports   Comments:                         PLAN: See eval  [x] Continue per plan of care [] Alter current plan (see comments above)  [] Plan of care initiated [] Hold pending MD visit [] Discharge    Electronically signed by:   Carina Dc PTA  Note: If patient does not return for scheduled/ recommended follow up visits, this note will serve as a discharge from care along with most recent update on progress.

## 2022-03-21 ENCOUNTER — HOSPITAL ENCOUNTER (OUTPATIENT)
Dept: PHYSICAL THERAPY | Age: 62
Setting detail: THERAPIES SERIES
Discharge: HOME | End: 2022-03-21
Payer: COMMERCIAL

## 2022-03-21 PROCEDURE — 97112 NEUROMUSCULAR REEDUCATION: CPT | Performed by: PHYSICAL THERAPY ASSISTANT

## 2022-03-21 PROCEDURE — 97140 MANUAL THERAPY 1/> REGIONS: CPT | Performed by: PHYSICAL THERAPY ASSISTANT

## 2022-03-21 PROCEDURE — 97110 THERAPEUTIC EXERCISES: CPT | Performed by: PHYSICAL THERAPY ASSISTANT

## 2022-03-21 NOTE — FLOWSHEET NOTE
[facility], [address] [place] [address] Riri Bangurayrus, [facility][note 3]  Phone: [phone], [phone]    Physical Therapy Treatment Note/ Progress Report:     Date:  3/21/2022    Patient Name:  Landy Chávez. :  1960  MRN: [id number]  Restrictions/Precautions:    Medical/Treatment Diagnosis Information:  Diagnosis: Left Rotator Cuff Repair on 1/10/22  Treatment Diagnosis: M21.333  Insurance/Certification information:  PT Insurance Information: [employer]  Physician Information:  Referring Practitioner: Edwin Velazco MD  Has the plan of care been signed (Y/N):        []  Yes  [x]  No     Date of Patient follow up with Physician: 3/8/2022    Is this a Progress Report:     []  Yes  [x]  No      If Yes:  Date Range for reporting period:  Initial Eval: 2022  Beginnin2022 --- Endin2022  Beginnin22 --- Ending 3/24/22  Beginning: 3/7/22 --- Ending 22        Progress report will be due (10 Rx or 30 days whichever is less): 3802    Recertification will be due (POC Duration  / 90 days whichever is less): 2022     Visit # Insurance Allowable Auth Required   In Person 17 24 visits 22-22 []  Yes     []  No    Tele Health   []  Yes     []  No    Total 17         Functional Scale: Quick Dash: 73% (Total Number Sum: 43/55)   Date assessed: 2022   Functional Scale: Quick Dash: 32% (Total = 25/55)    Date assessed:  2022  Functional Scale: Quick Dash: 16% (Total = 18/55)    Date assessed:   3/7/2022        Latex Allergy:  [x]NO      []YES      Preferred Language for Healthcare:   [x]English       []other:    Pain level: 0/10 currently     SUBJECTIVE: (9 weeks PO on 3/7/22) Patient reports that he is doing well - no new complaints reported.       OBJECTIVE: See eval   Observation:    Test measurements:      RESTRICTIONS/PRECAUTIONS: HTN, Hx of heart problems                   Exercises/Interventions:   Therapeutic Ex (02692)  Therapeutic Activity (33966)  NMR re-education (40863) Sets/Reps Notes/CUES   Pulley 5 min    Towel IR stretch  10x10\"    Doorway ER stretch  10x10\"    Crossover stretch  10x10\"    Wall walks with ecc lower 5\" x20    Functional lift Kick ball x20         Ball on wall  X20 4 way B green plyo     Wall push up  x20    PRE Flexion / scaption  X20 / x20    Flexion to shelf  x20 1#   Tricep extension Blue x30         TB rows  TB ext Blue x30  Blue x30    TB IR   TB ER  Red 3x10  Red 3x10              Elbow flexion AROM x15 ea of 3 way 3#   Body blade 3x15 sec ea Elbow at side        Cane press 2# x30    Cane flexion 2# x20    Cane ER 10\" x10 90/90 position        Supine punch x30 3#   Supine flexion  3# x30    SL ER / SL Flexion / Long lever ABD  2# x20 /2# x20 / 1# x20         Bent over row  Bent over ext 3# x30  3# x30              Manual Intervention (94958)     Shoulder PROM- gentle 10' Flexion to 90°, abd to 90°, IR, ER to neutral                               Medbridge access code: Nataliya Bennett  Access Code: Nataliya Bennett  URL: [email or web address]  Date: 01/24/2022  Prepared by: Breanne Lockwood    Exercises  Seated Scapular Retraction - 1-2 x daily - 7 x weekly - 3 sets - 10 reps - 5 s hold  Seated Shoulder Shrugs - 1-2 x daily - 7 x weekly - 3 sets - 10 reps  Seated Neck Sidebending Stretch - 1-2 x daily - 7 x weekly - 3 reps - 30s hold  Wrist AROM Radial Ulnar Deviation - 1-2 x daily - 7 x weekly - 3 sets - 10 reps  Forearm Strengthening with Ball Squeeze - 1-2 x daily - 7 x weekly - 3 sets - 10 reps  Seated Wrist Extension AROM - 1-2 x daily - 7 x weekly - 3 sets - 10 reps  Wrist Flexion Extension AROM with Fingers Curled and Palm Down - 1-2 x daily - 7 x weekly - 3 sets - 10 reps  Circular Shoulder Pendulum with Table Support - 1-2 x daily - 7 x weekly - 3 sets - 10 reps  Flexion-Extension Shoulder Pendulum with Table Support - 1-2 x daily - 7 x weekly - 3 sets - 10 reps  Seated Elbow Flexion Extension AROM - 1-2 x daily - 7 x weekly - 3 sets - 10 reps                  Patient Education 10'  Pt education with HEP and progression of PT along with compliance with HEP to aide with formal PT for optimal outcomes. Therapeutic Exercise and NMR EXR  [x] (44365) Provided verbal/tactile cueing for activities related to strengthening, flexibility, endurance, ROM  for improvements in scapular, scapulothoracic and UE control with self care, reaching, carrying, lifting, house/yardwork, driving/computer work. [x] (33648) Provided verbal/tactile cueing for activities related to improving balance, coordination, kinesthetic sense, posture, motor skill, proprioception  to assist with  scapular, scapulothoracic and UE control with self care, reaching, carrying, lifting, house/yardwork, driving/computer work. Therapeutic Activities:    [x] (85890 or 85529) Provided verbal/tactile cueing for activities related to improving balance, coordination, kinesthetic sense, posture, motor skill, proprioception and motor activation to allow for proper function of scapular, scapulothoracic and UE control with self care, carrying, lifting, driving/computer work.      Home Exercise Program:    [x] (87507) Reviewed/Progressed HEP activities related to strengthening, flexibility, endurance, ROM of scapular, scapulothoracic and UE control with self care, reaching, carrying, lifting, house/yardwork, driving/computer work  [x] (63738) Reviewed/Progressed HEP activities related to improving balance, coordination, kinesthetic sense, posture, motor skill, proprioception of scapular, scapulothoracic and UE control with self care, reaching, carrying, lifting, house/yardwork, driving/computer work      Manual Treatments:  PROM / STM / Oscillations-Mobs:  G-I, II, III, IV (PA's, Inf., Post.)  [x] (75712) Provided manual therapy to mobilize soft tissue/joints of cervical/CT, scapular GHJ and UE for the purpose of modulating pain, promoting relaxation,  increasing ROM, reducing/eliminating soft tissue swelling/inflammation/restriction, improving soft tissue extensibility and allowing for proper ROM for normal function with self care, reaching, carrying, lifting, house/yardwork, driving/computer work    Modalities:      Charges:  Timed Code Treatment Minutes: 54'   Total Treatment Minutes:  55'   [facility]:  [facility] TIME:  MANUAL TIME:  UNTIMED MINUTES:  Medicare Total:         [] EVAL (LOW) 27467 (typically 20 minutes face-to-face)  [] EVAL (MOD) 00102 (typically 30 minutes face-to-face)  [] EVAL (HIGH) 15412 (typically 45 minutes face-to-face)  [] RE-EVAL     [x] IJ(87425) x 2    [] IONTO  [x] NMR (67447) x 1  [] VASO  [x] Manual (28984) x 1    [] Other:  [] TA x      [] Mech Traction (07776)  [] ES(attended) (23038)     [] ES (un) (07039):    ASSESSMENT:  See eval    GOALS:   Short Term Goals: To be achieved in: 2 weeks  1. Independent in HEP and progression per patient tolerance, in order to prevent re-injury. [] Progressing: [x] Met: [] Not Met: [] Adjusted   2. Patient will have a decrease in pain to facilitate improvement in movement, function, and ADLs as indicated by Functional Deficits. [] Progressing: [x] Met: [] Not Met: [] Adjusted     Long Term Goals: To be achieved in: 12 weeks  1. Disability index score of 20% or less for the QuickDash/Oswestry to assist with reaching prior level of function. [] Progressing: [x] Met: [] Not Met: [] Adjusted   2. Patient will demonstrate increased AROM on the left to equal the opposite side bilaterally to allow for ability to reach overhead pain and restriction free as indicated by patients Functional Deficits. [x] Progressing: [] Met: [] Not Met: [] Adjusted   3. Patient will demonstrate an increase in strength of the L UE to 4+/5 to allow for ability to perform all functional activities without restriction as indicated by patients Functional Deficits. [x] Progressing: [] Met: [] Not Met: [] Adjusted   4.  Patient will return to all transfers, work activities, and functional activities without increased symptoms or restriction. [x] Progressing: [] Met: [] Not Met: [] Adjusted   5. Patient will have 0/10 pain with ADL's. 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 [x] Progressing: [] Met: [] Not Met: [] Adjusted     Overall Progression Towards Functional goals/ Treatment Progress Update:  [x] Patient is progressing as expected towards functional goals listed. [] Progression is slowed due to complexities/Impairments listed. [] Progression has been slowed due to co-morbidities.   [] Plan just implemented, too soon to assess goals progression <30days   [] Goals require adjustment due to lack of progress  [] Patient is not progressing as expected and requires additional follow up with physician  [] Other    Prognosis for POC: [x] Good [] Fair  [] Poor    Patient requires continued skilled intervention: [x] Yes  [] No    Treatment/Activity Tolerance:  [x] Patient able to complete treatment  [] Patient limited by fatigue  [] Patient limited by pain    [] Patient limited by other medical complications  [] Other:     Return to Play: (if applicable)   []  Stage 1: Intro to Strength   []  Stage 2: Return to Run and Strength   []  Stage 3: Return to Jump and Strength   []  Stage 4: Dynamic Strength and Agility   []  Stage 5: Sport Specific Training     []  Ready to Return to Play, Meets All Above Stages   []  Not Ready for Return to Sports   Comments:                         PLAN: See eval  [x] Continue per plan of care [] Alter current plan (see comments above)  [] Plan of care initiated [] Hold pending MD visit [] Discharge    Electronically signed by:   Kristal Perez PTA  Note: If patient does not return for scheduled/ recommended follow up visits, this note will serve as a discharge from care along with most recent update on progress.

## 2022-03-24 ENCOUNTER — HOSPITAL ENCOUNTER (OUTPATIENT)
Dept: PHYSICAL THERAPY | Age: 62
Setting detail: THERAPIES SERIES
Discharge: HOME | End: 2022-03-24
Payer: COMMERCIAL

## 2022-03-24 PROCEDURE — 97112 NEUROMUSCULAR REEDUCATION: CPT | Performed by: PHYSICAL THERAPY ASSISTANT

## 2022-03-24 PROCEDURE — 97140 MANUAL THERAPY 1/> REGIONS: CPT | Performed by: PHYSICAL THERAPY ASSISTANT

## 2022-03-24 PROCEDURE — 97110 THERAPEUTIC EXERCISES: CPT | Performed by: PHYSICAL THERAPY ASSISTANT

## 2022-03-24 NOTE — FLOWSHEET NOTE
[facility], [address] [place] [address] Cynthia Bangura, [facility]  Phone: [phone], [phone]    Physical Therapy Treatment Note/ Progress Report:     Date:  3/24/2022    Patient Name:  Beverley Davila. :  1960  MRN: [id number]  Restrictions/Precautions:    Medical/Treatment Diagnosis Information:  Diagnosis: Left Rotator Cuff Repair on 1/10/22  Treatment Diagnosis: A28.601  Insurance/Certification information:  PT Insurance Information: [employer]  Physician Information:  Referring Practitioner: Lupe Garcia MD  Has the plan of care been signed (Y/N):        []  Yes  [x]  No     Date of Patient follow up with Physician: 3/8/2022    Is this a Progress Report:     []  Yes  [x]  No      If Yes:  Date Range for reporting period:  Initial Eval: 2022  Beginnin2022 --- Endin2022  Beginnin22 --- Ending 3/24/22  Beginning: 3/7/22 --- Ending 22        Progress report will be due (10 Rx or 30 days whichever is less):     Recertification will be due (POC Duration  / 90 days whichever is less): 2022     Visit # Insurance Allowable Auth Required   In Person 18 24 visits 22-22 []  Yes     []  No    Tele Health   []  Yes     []  No    Total 18         Functional Scale: Quick Dash: 73% (Total Number Sum: 43/55)   Date assessed: 2022   Functional Scale: Quick Dash: 32% (Total = 25/55)    Date assessed:  2022  Functional Scale: Quick Dash: 16% (Total = 18/55)    Date assessed:   3/7/2022        Latex Allergy:  [x]NO      []YES      Preferred Language for Healthcare:   [x]English       []other:    Pain level: 0/10 currently     SUBJECTIVE: (9 weeks PO on 3/7/22) Doing okay - slept on it wrong and it is a little more sore.       OBJECTIVE: See eval   Observation:    Test measurements:      RESTRICTIONS/PRECAUTIONS: HTN, Hx of heart problems                   Exercises/Interventions:   Therapeutic Ex (07640)  Therapeutic Activity (37754)  [facility] re-education (89369) Sets/Reps Notes/CUES   Pulley 5 min    Towel IR stretch  10x10\"    Doorway ER stretch  10x10\"    Crossover stretch  10x10\"    Wall walks with ecc lower 5\" x20    Functional lift Kick ball x20         Ball on wall  X20 4 way B green plyo     Wall push up  x30    PRE Flexion / scaption  x20 / x20 1#   Flexion to shelf  x20 1#   Tricep extension 25# x30         TB rows  TB ext Black x30  Black x20    TB IR   TB ER  Green  3x10  Green 3x10              Elbow flexion AROM x15 ea of 3 way 3#   Body blade 3x15 sec ea Elbow at side        Cane press 2# x30    Cane flexion 2# x20    Cane ER 10\" x10 90/90 position        Supine punch x30 3#   Supine flexion  3# x30    SL ER / SL Flexion / Long lever ABD  2# x30 /2# x20 / 1# x20    PNF supine D2 1# x20    Bent over row  Bent over ext 3# x30  3# x30              Manual Intervention (48640)     Shoulder PROM- gentle 10' Flexion to 90°, abd to 90°, IR, ER to neutral                               [employer] access code: Chema Wellington  Access Code: Chema Wellington  URL: [email or web address]  Date: 01/24/2022  Prepared by: Christiano Shearer    Exercises  Seated Scapular Retraction - 1-2 x daily - 7 x weekly - 3 sets - 10 reps - 5 s hold  Seated Shoulder Shrugs - 1-2 x daily - 7 x weekly - 3 sets - 10 reps  Seated Neck Sidebending Stretch - 1-2 x daily - 7 x weekly - 3 reps - 30s hold  Wrist AROM Radial Ulnar Deviation - 1-2 x daily - 7 x weekly - 3 sets - 10 reps  Forearm Strengthening with Ball Squeeze - 1-2 x daily - 7 x weekly - 3 sets - 10 reps  Seated Wrist Extension AROM - 1-2 x daily - 7 x weekly - 3 sets - 10 reps  Wrist Flexion Extension AROM with Fingers Curled and Palm Down - 1-2 x daily - 7 x weekly - 3 sets - 10 reps  Circular Shoulder Pendulum with Table Support - 1-2 x daily - 7 x weekly - 3 sets - 10 reps  Flexion-Extension Shoulder Pendulum with Table Support - 1-2 x daily - 7 x weekly - 3 sets - 10 reps  Seated Elbow Flexion Extension AROM - 1-2 x daily - 7 x weekly - 3 sets - 10 reps                  Patient Education 10'  Pt education with HEP and progression of PT along with compliance with HEP to aide with formal PT for optimal outcomes. Therapeutic Exercise and NMR EXR  [x] (68659) Provided verbal/tactile cueing for activities related to strengthening, flexibility, endurance, ROM  for improvements in scapular, scapulothoracic and UE control with self care, reaching, carrying, lifting, house/yardwork, driving/computer work. [x] (36924) Provided verbal/tactile cueing for activities related to improving balance, coordination, kinesthetic sense, posture, motor skill, proprioception  to assist with  scapular, scapulothoracic and UE control with self care, reaching, carrying, lifting, house/yardwork, driving/computer work. Therapeutic Activities:    [x] (16441 or 00975) Provided verbal/tactile cueing for activities related to improving balance, coordination, kinesthetic sense, posture, motor skill, proprioception and motor activation to allow for proper function of scapular, scapulothoracic and UE control with self care, carrying, lifting, driving/computer work.      Home Exercise Program:    [x] (89602) Reviewed/Progressed HEP activities related to strengthening, flexibility, endurance, ROM of scapular, scapulothoracic and UE control with self care, reaching, carrying, lifting, house/yardwork, driving/computer work  [x] (37879) Reviewed/Progressed HEP activities related to improving balance, coordination, kinesthetic sense, posture, motor skill, proprioception of scapular, scapulothoracic and UE control with self care, reaching, carrying, lifting, house/yardwork, driving/computer work      Manual Treatments:  PROM / STM / Oscillations-Mobs:  G-I, II, III, IV (PA's, Inf., Post.)  [x] (00090) Provided manual therapy to mobilize soft tissue/joints of cervical/CT, scapular GHJ and UE for the purpose of modulating pain, promoting relaxation, increasing ROM, reducing/eliminating soft tissue swelling/inflammation/restriction, improving soft tissue extensibility and allowing for proper ROM for normal function with self care, reaching, carrying, lifting, house/yardwork, driving/computer work    Modalities:      Charges:  Timed Code Treatment Minutes: 54'   Total Treatment Minutes:  55'   [facility]:  [facility] TIME:  MANUAL TIME:  UNTIMED MINUTES:  Medicare Total:         [] EVAL (LOW) 23388 (typically 20 minutes face-to-face)  [] EVAL (MOD) 07748 (typically 30 minutes face-to-face)  [] EVAL (HIGH) 92881 (typically 45 minutes face-to-face)  [] RE-EVAL     [x] PO(56614) x 2    [] IONTO  [x] NMR (27245) x 1  [] VASO  [x] Manual (82212) x 1    [] Other:  [] TA x      [] Mech Traction (34598)  [] ES(attended) (70134)     [] ES (un) (49157):    ASSESSMENT:  See eval    GOALS:   Short Term Goals: To be achieved in: 2 weeks  1. Independent in HEP and progression per patient tolerance, in order to prevent re-injury. [] Progressing: [x] Met: [] Not Met: [] Adjusted   2. Patient will have a decrease in pain to facilitate improvement in movement, function, and ADLs as indicated by Functional Deficits. [] Progressing: [x] Met: [] Not Met: [] Adjusted     Long Term Goals: To be achieved in: 12 weeks  1. Disability index score of 20% or less for the QuickDash/Oswestry to assist with reaching prior level of function. [] Progressing: [x] Met: [] Not Met: [] Adjusted   2. Patient will demonstrate increased AROM on the left to equal the opposite side bilaterally to allow for ability to reach overhead pain and restriction free as indicated by patients Functional Deficits. [x] Progressing: [] Met: [] Not Met: [] Adjusted   3. Patient will demonstrate an increase in strength of the L UE to 4+/5 to allow for ability to perform all functional activities without restriction as indicated by patients Functional Deficits. [x] Progressing: [] Met: [] Not Met: [] Adjusted   4. Patient will return to all transfers, work activities, and functional activities without increased symptoms or restriction. [x] Progressing: [] Met: [] Not Met: [] Adjusted   5. Patient will have 0/10 pain with ADL's. 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 [x] Progressing: [] Met: [] Not Met: [] Adjusted     Overall Progression Towards Functional goals/ Treatment Progress Update:  [x] Patient is progressing as expected towards functional goals listed. [] Progression is slowed due to complexities/Impairments listed. [] Progression has been slowed due to co-morbidities.   [] Plan just implemented, too soon to assess goals progression <30days   [] Goals require adjustment due to lack of progress  [] Patient is not progressing as expected and requires additional follow up with physician  [] Other    Prognosis for POC: [x] Good [] Fair  [] Poor    Patient requires continued skilled intervention: [x] Yes  [] No    Treatment/Activity Tolerance:  [x] Patient able to complete treatment  [] Patient limited by fatigue  [] Patient limited by pain    [] Patient limited by other medical complications  [] Other:     Return to Play: (if applicable)   []  Stage 1: Intro to Strength   []  Stage 2: Return to Run and Strength   []  Stage 3: Return to Jump and Strength   []  Stage 4: Dynamic Strength and Agility   []  Stage 5: Sport Specific Training     []  Ready to Return to Play, Meets All Above Stages   []  Not Ready for Return to Sports   Comments:                         PLAN: See eval  [x] Continue per plan of care [] Alter current plan (see comments above)  [] Plan of care initiated [] Hold pending MD visit [] Discharge    Electronically signed by:   Lety West PTA  Note: If patient does not return for scheduled/ recommended follow up visits, this note will serve as a discharge from care along with most recent update on progress.

## 2022-03-25 ENCOUNTER — NURSE ONLY (OUTPATIENT)
Dept: FAMILY MEDICINE CLINIC | Age: 62
End: 2022-03-25
Payer: COMMERCIAL

## 2022-03-25 DIAGNOSIS — Z12.5 PROSTATE CANCER SCREENING: ICD-10-CM

## 2022-03-25 DIAGNOSIS — E78.00 PURE HYPERCHOLESTEROLEMIA: ICD-10-CM

## 2022-03-25 DIAGNOSIS — I10 BENIGN ESSENTIAL HTN: ICD-10-CM

## 2022-03-25 LAB
A/G RATIO: 2.1 (ref 1.1–2.2)
ALBUMIN SERPL-MCNC: 4.8 G/DL (ref 3.4–5)
ALP BLD-CCNC: 73 U/L (ref 40–129)
ALT SERPL-CCNC: 21 U/L (ref 10–40)
ANION GAP SERPL CALCULATED.3IONS-SCNC: 16 MMOL/L (ref 3–16)
AST SERPL-CCNC: 17 U/L (ref 15–37)
BASOPHILS ABSOLUTE: 0 K/UL (ref 0–0.2)
BASOPHILS RELATIVE PERCENT: 0.8 %
BILIRUB SERPL-MCNC: 0.7 MG/DL (ref 0–1)
BUN BLDV-MCNC: 17 MG/DL (ref 7–20)
CALCIUM SERPL-MCNC: 10 MG/DL (ref 8.3–10.6)
CHLORIDE BLD-SCNC: 106 MMOL/L (ref 99–110)
CHOLESTEROL, TOTAL: 163 MG/DL (ref 0–199)
CO2: 21 MMOL/L (ref 21–32)
CREAT SERPL-MCNC: 0.9 MG/DL (ref 0.8–1.3)
EOSINOPHILS ABSOLUTE: 0.2 K/UL (ref 0–0.6)
EOSINOPHILS RELATIVE PERCENT: 4.8 %
GFR AFRICAN AMERICAN: >60
GFR NON-AFRICAN AMERICAN: >60
GLUCOSE BLD-MCNC: 92 MG/DL (ref 70–99)
HCT VFR BLD CALC: 47.1 % (ref 40.5–52.5)
HDLC SERPL-MCNC: 44 MG/DL (ref 40–60)
HEMOGLOBIN: 16.1 G/DL (ref 13.5–17.5)
LDL CHOLESTEROL CALCULATED: 88 MG/DL
LYMPHOCYTES ABSOLUTE: 1.2 K/UL (ref 1–5.1)
LYMPHOCYTES RELATIVE PERCENT: 29.7 %
MCH RBC QN AUTO: 30.4 PG (ref 26–34)
MCHC RBC AUTO-ENTMCNC: 34.1 G/DL (ref 31–36)
MCV RBC AUTO: 89.1 FL (ref 80–100)
MONOCYTES ABSOLUTE: 0.3 K/UL (ref 0–1.3)
MONOCYTES RELATIVE PERCENT: 7.5 %
NEUTROPHILS ABSOLUTE: 2.4 K/UL (ref 1.7–7.7)
NEUTROPHILS RELATIVE PERCENT: 57.2 %
PDW BLD-RTO: 13.3 % (ref 12.4–15.4)
PLATELET # BLD: 154 K/UL (ref 135–450)
PMV BLD AUTO: 8.2 FL (ref 5–10.5)
POTASSIUM SERPL-SCNC: 4.5 MMOL/L (ref 3.5–5.1)
PROSTATE SPECIFIC ANTIGEN: 2.47 NG/ML (ref 0–4)
RBC # BLD: 5.28 M/UL (ref 4.2–5.9)
SODIUM BLD-SCNC: 143 MMOL/L (ref 136–145)
TOTAL PROTEIN: 7.1 G/DL (ref 6.4–8.2)
TRIGL SERPL-MCNC: 156 MG/DL (ref 0–150)
VLDLC SERPL CALC-MCNC: 31 MG/DL
WBC # BLD: 4.1 K/UL (ref 4–11)

## 2022-03-25 PROCEDURE — 36415 COLL VENOUS BLD VENIPUNCTURE: CPT | Performed by: FAMILY MEDICINE

## 2022-03-28 ENCOUNTER — HOSPITAL ENCOUNTER (OUTPATIENT)
Dept: PHYSICAL THERAPY | Age: 62
Setting detail: THERAPIES SERIES
Discharge: HOME | End: 2022-03-28
Payer: COMMERCIAL

## 2022-03-28 ENCOUNTER — OFFICE VISIT (OUTPATIENT)
Dept: FAMILY MEDICINE CLINIC | Age: 62
End: 2022-03-28
Payer: COMMERCIAL

## 2022-03-28 VITALS
BODY MASS INDEX: 29.6 KG/M2 | WEIGHT: 206.8 LBS | OXYGEN SATURATION: 97 % | DIASTOLIC BLOOD PRESSURE: 90 MMHG | RESPIRATION RATE: 18 BRPM | HEART RATE: 64 BPM | HEIGHT: 70 IN | SYSTOLIC BLOOD PRESSURE: 134 MMHG

## 2022-03-28 DIAGNOSIS — Z00.00 ANNUAL PHYSICAL EXAM: Primary | ICD-10-CM

## 2022-03-28 DIAGNOSIS — I25.10 CORONARY ARTERY DISEASE DUE TO LIPID RICH PLAQUE: ICD-10-CM

## 2022-03-28 DIAGNOSIS — M54.17 LUMBOSACRAL RADICULOPATHY AT L5: ICD-10-CM

## 2022-03-28 DIAGNOSIS — I10 BENIGN ESSENTIAL HTN: ICD-10-CM

## 2022-03-28 DIAGNOSIS — M75.42 ROTATOR CUFF IMPINGEMENT SYNDROME OF LEFT SHOULDER: ICD-10-CM

## 2022-03-28 DIAGNOSIS — E78.00 PURE HYPERCHOLESTEROLEMIA: ICD-10-CM

## 2022-03-28 DIAGNOSIS — I25.83 CORONARY ARTERY DISEASE DUE TO LIPID RICH PLAQUE: ICD-10-CM

## 2022-03-28 DIAGNOSIS — I48.0 PAROXYSMAL ATRIAL FIBRILLATION (HCC): ICD-10-CM

## 2022-03-28 PROCEDURE — 99396 PREV VISIT EST AGE 40-64: CPT | Performed by: FAMILY MEDICINE

## 2022-03-28 PROCEDURE — 97140 MANUAL THERAPY 1/> REGIONS: CPT | Performed by: PHYSICAL THERAPY ASSISTANT

## 2022-03-28 PROCEDURE — 97110 THERAPEUTIC EXERCISES: CPT | Performed by: PHYSICAL THERAPY ASSISTANT

## 2022-03-28 PROCEDURE — 97112 NEUROMUSCULAR REEDUCATION: CPT | Performed by: PHYSICAL THERAPY ASSISTANT

## 2022-03-28 SDOH — ECONOMIC STABILITY: FOOD INSECURITY: WITHIN THE PAST 12 MONTHS, YOU WORRIED THAT YOUR FOOD WOULD RUN OUT BEFORE YOU GOT MONEY TO BUY MORE.: NEVER TRUE

## 2022-03-28 SDOH — ECONOMIC STABILITY: FOOD INSECURITY: WITHIN THE PAST 12 MONTHS, THE FOOD YOU BOUGHT JUST DIDN'T LAST AND YOU DIDN'T HAVE MONEY TO GET MORE.: NEVER TRUE

## 2022-03-28 ASSESSMENT — PATIENT HEALTH QUESTIONNAIRE - PHQ9
SUM OF ALL RESPONSES TO PHQ9 QUESTIONS 1 & 2: 0
SUM OF ALL RESPONSES TO PHQ QUESTIONS 1-9: 0
2. FEELING DOWN, DEPRESSED OR HOPELESS: 0
SUM OF ALL RESPONSES TO PHQ QUESTIONS 1-9: 0
1. LITTLE INTEREST OR PLEASURE IN DOING THINGS: 0
SUM OF ALL RESPONSES TO PHQ QUESTIONS 1-9: 0
SUM OF ALL RESPONSES TO PHQ QUESTIONS 1-9: 0

## 2022-03-28 ASSESSMENT — SOCIAL DETERMINANTS OF HEALTH (SDOH): HOW HARD IS IT FOR YOU TO PAY FOR THE VERY BASICS LIKE FOOD, HOUSING, MEDICAL CARE, AND HEATING?: NOT HARD AT ALL

## 2022-03-28 NOTE — PATIENT INSTRUCTIONS

## 2022-03-28 NOTE — FLOWSHEET NOTE
[facility], [address] [place] [address] [facility] [address]  Phone: [phone], [phone]    Physical Therapy Treatment Note/ Progress Report:     Date:  3/28/2022    Patient Name:  Amanda Harris.     :  1960  MRN: [id number]  Restrictions/Precautions:    Medical/Treatment Diagnosis Information:  Diagnosis: Left Rotator Cuff Repair on 1/10/22  Treatment Diagnosis: P47.178  Insurance/Certification information:  PT Insurance Information: [employer]  Physician Information:  Referring Practitioner: Mervin Donovan MD  Has the plan of care been signed (Y/N):        []  Yes  [x]  No     Date of Patient follow up with Physician: 3/8/2022    Is this a Progress Report:     []  Yes  [x]  No      If Yes:  Date Range for reporting period:  Initial Eval: 2022  Beginnin2022 --- Endin2022  Beginnin22 --- Ending 3/24/22  Beginning: 3/7/22 --- Ending 22        Progress report will be due (10 Rx or 30 days whichever is less): 4161    Recertification will be due (POC Duration  / 90 days whichever is less): 2022     Visit # Insurance Allowable Auth Required   In Person 19 24 visits 22-22 []  Yes     []  No    Tele Health   []  Yes     []  No    Total 19         Functional Scale: Quick Dash: 73% (Total Number Sum: 43/55)   Date assessed: 2022   Functional Scale: Quick Dash: 32% (Total = 25/55)    Date assessed:  2022  Functional Scale: Quick Dash: 16% (Total = 18/55)    Date assessed:   3/7/2022        Latex Allergy:  [x]NO      []YES      Preferred Language for Healthcare:   [x]English       []other:    Pain level: 0/10 currently     SUBJECTIVE: (9 weeks PO on 3/7/22)       OBJECTIVE: See eval   Observation:    Test measurements:      RESTRICTIONS/PRECAUTIONS: HTN, Hx of heart problems                   Exercises/Interventions:   Therapeutic Ex (69729)  Therapeutic Activity (21428)  NMR re-education (35424) Sets/Reps Notes/CUES Pulley 5 min         Towel IR stretch  10x10\"    Doorway ER stretch  10x10\"    Crossover stretch  10x10\"    Wall walks with ecc lower 5\" x20    Functional lift Kick ball x20         Ball on wall  x20 4 way B green plyo     Wall push up  x30    PRE Flexion / scaption  x20 / x20 1#   Flexion to shelf  x20 1#   Tricep extension 25# x30         TB rows  TB ext Black x30  Black x20    TB IR   TB ER  Green  3x10  Green 3x10              Elbow flexion AROM x15 ea of 3 way 5#   Body blade 3x15 sec ea Elbow at side        Cane press 2# x30    Cane flexion 2# x20    Cane ER 10\" x10 90/90 position        Supine punch x30 4#   Supine flexion  3# x30    SL ER / SL Flexion / Long lever ABD  2# x30 /2# x20 / 1# x20    PNF supine D2 1# x20    Bent over row  Bent over ext 3# x30  3# x30              Manual Intervention (30368)     Shoulder PROM- gentle 10' Flexion to 90°, abd to 90°, IR, ER to neutral                               [employer] access code: Massiel Cardozo  Access Code: Massiel Cardozo  URL: [email or web address]  Date: 01/24/2022  Prepared by: Mario Mora    Exercises  Seated Scapular Retraction - 1-2 x daily - 7 x weekly - 3 sets - 10 reps - 5 s hold  Seated Shoulder Shrugs - 1-2 x daily - 7 x weekly - 3 sets - 10 reps  Seated Neck Sidebending Stretch - 1-2 x daily - 7 x weekly - 3 reps - 30s hold  Wrist AROM Radial Ulnar Deviation - 1-2 x daily - 7 x weekly - 3 sets - 10 reps  Forearm Strengthening with Ball Squeeze - 1-2 x daily - 7 x weekly - 3 sets - 10 reps  Seated Wrist Extension AROM - 1-2 x daily - 7 x weekly - 3 sets - 10 reps  Wrist Flexion Extension AROM with Fingers Curled and Palm Down - 1-2 x daily - 7 x weekly - 3 sets - 10 reps  Circular Shoulder Pendulum with Table Support - 1-2 x daily - 7 x weekly - 3 sets - 10 reps  Flexion-Extension Shoulder Pendulum with Table Support - 1-2 x daily - 7 x weekly - 3 sets - 10 reps  Seated Elbow Flexion Extension AROM - 1-2 x daily - 7 x weekly - 3 sets - 10 reps Patient Education 10'  Pt education with HEP and progression of PT along with compliance with HEP to aide with formal PT for optimal outcomes. Therapeutic Exercise and NMR EXR  [x] (74263) Provided verbal/tactile cueing for activities related to strengthening, flexibility, endurance, ROM  for improvements in scapular, scapulothoracic and UE control with self care, reaching, carrying, lifting, house/yardwork, driving/computer work. [x] (53510) Provided verbal/tactile cueing for activities related to improving balance, coordination, kinesthetic sense, posture, motor skill, proprioception  to assist with  scapular, scapulothoracic and UE control with self care, reaching, carrying, lifting, house/yardwork, driving/computer work. Therapeutic Activities:    [x] (96618 or 89534) Provided verbal/tactile cueing for activities related to improving balance, coordination, kinesthetic sense, posture, motor skill, proprioception and motor activation to allow for proper function of scapular, scapulothoracic and UE control with self care, carrying, lifting, driving/computer work.      Home Exercise Program:    [x] (50964) Reviewed/Progressed HEP activities related to strengthening, flexibility, endurance, ROM of scapular, scapulothoracic and UE control with self care, reaching, carrying, lifting, house/yardwork, driving/computer work  [x] (30638) Reviewed/Progressed HEP activities related to improving balance, coordination, kinesthetic sense, posture, motor skill, proprioception of scapular, scapulothoracic and UE control with self care, reaching, carrying, lifting, house/yardwork, driving/computer work      Manual Treatments:  PROM / STM / Oscillations-Mobs:  G-I, II, III, IV (PA's, Inf., Post.)  [x] (68548) Provided manual therapy to mobilize soft tissue/joints of cervical/CT, scapular GHJ and UE for the purpose of modulating pain, promoting relaxation,  increasing ROM, reducing/eliminating soft tissue swelling/inflammation/restriction, improving soft tissue extensibility and allowing for proper ROM for normal function with self care, reaching, carrying, lifting, house/yardwork, driving/computer work    Modalities:      Charges:  Timed Code Treatment Minutes: 54'   Total Treatment Minutes:  55'   [facility]:  [facility] TIME:  MANUAL TIME:  UNTIMED MINUTES:  Medicare Total:         [] EVAL (LOW) 19198 (typically 20 minutes face-to-face)  [] EVAL (MOD) 39230 (typically 30 minutes face-to-face)  [] EVAL (HIGH) 19131 (typically 45 minutes face-to-face)  [] RE-EVAL     [x] VQ(79474) x 2    [] IONTO  [x] NMR (15889) x 1  [] VASO  [x] Manual (41995) x 1    [] Other:  [] TA x      [] Mech Traction (75737)  [] ES(attended) (71548)     [] ES (un) (03911):    ASSESSMENT:  See eval    GOALS:   Short Term Goals: To be achieved in: 2 weeks  1. Independent in HEP and progression per patient tolerance, in order to prevent re-injury. [] Progressing: [x] Met: [] Not Met: [] Adjusted   2. Patient will have a decrease in pain to facilitate improvement in movement, function, and ADLs as indicated by Functional Deficits. [] Progressing: [x] Met: [] Not Met: [] Adjusted     Long Term Goals: To be achieved in: 12 weeks  1. Disability index score of 20% or less for the QuickDash/Oswestry to assist with reaching prior level of function. [] Progressing: [x] Met: [] Not Met: [] Adjusted   2. Patient will demonstrate increased AROM on the left to equal the opposite side bilaterally to allow for ability to reach overhead pain and restriction free as indicated by patients Functional Deficits. [x] Progressing: [] Met: [] Not Met: [] Adjusted   3. Patient will demonstrate an increase in strength of the L UE to 4+/5 to allow for ability to perform all functional activities without restriction as indicated by patients Functional Deficits. [x] Progressing: [] Met: [] Not Met: [] Adjusted   4.  Patient will return to all transfers, work activities, and functional activities without increased symptoms or restriction. [x] Progressing: [] Met: [] Not Met: [] Adjusted   5. Patient will have 0/10 pain with ADL's. 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 [x] Progressing: [] Met: [] Not Met: [] Adjusted     Overall Progression Towards Functional goals/ Treatment Progress Update:  [x] Patient is progressing as expected towards functional goals listed. [] Progression is slowed due to complexities/Impairments listed. [] Progression has been slowed due to co-morbidities. [] Plan just implemented, too soon to assess goals progression <30days   [] Goals require adjustment due to lack of progress  [] Patient is not progressing as expected and requires additional follow up with physician  [] Other    Prognosis for POC: [x] Good [] Fair  [] Poor    Patient requires continued skilled intervention: [x] Yes  [] No    Treatment/Activity Tolerance:  [x] Patient able to complete treatment  [] Patient limited by fatigue  [] Patient limited by pain    [] Patient limited by other medical complications  [] Other:     Return to Play: (if applicable)   []  Stage 1: Intro to Strength   []  Stage 2: Return to Run and Strength   []  Stage 3: Return to Jump and Strength   []  Stage 4: Dynamic Strength and Agility   []  Stage 5: Sport Specific Training     []  Ready to Return to Play, Meets All Above Stages   []  Not Ready for Return to Sports   Comments:                         PLAN: See eval  [x] Continue per plan of care [] Alter current plan (see comments above)  [] Plan of care initiated [] Hold pending MD visit [] Discharge    Electronically signed by:   Meghan Acharya PTA  Note: If patient does not return for scheduled/ recommended follow up visits, this note will serve as a discharge from care along with most recent update on progress.

## 2022-03-28 NOTE — PROGRESS NOTES
Chief Complaint   Patient presents with    Hypertension    Hyperlipidemia    Atrial Fibrillation        Internal Administration   First Dose COVID-19, Aldo Hale, Primary or Immunocompromised, PF, 100mcg/0.5mL  2021   Second Dose COVID-19, Aldo Hale, Primary or Immunocompromised, PF, 100mcg/0.5mL   04/15/2021       Last COVID Lab No results found for: SARS-COV-2, SARS-COV-2 RNA, SARS-COV-2, SARS-COV-2, SARS-COV-2 BY PCR, SARS-COV-2, SARS-COV-2, SARS-COV-2          Wt Readings from Last 3 Encounters:   22 206 lb 12.8 oz (93.8 kg)   22 210 lb (95.3 kg)   21 195 lb (88.5 kg)     BP Readings from Last 3 Encounters:   22 (!) 134/90   22 124/85   21 128/80      No results found for: LABA1C    HPI:  Lisa Carpenter. is a 60 y.o. (: 1960) here today for     patient denies any chest pain and states that his breathing has been doing well    He states that his blood pressures are all over the place at home. He states that his cardiologist wants to keep it a little higher due to where he was having the episodes of passing out. He states that he has not had anymore issues with passing out. He states that his shoulder is much better, he did finally have the shoulder surgery on the left arm and he states that the arm is starting to get better.      The 10-year ASCVD risk score (Giovanni Claudio, et al., 2013) is: 12.7%    Values used to calculate the score:      Age: 60 years      Sex: Male      Is Non- : No      Diabetic: No      Tobacco smoker: No      Systolic Blood Pressure: 876 mmHg      Is BP treated: Yes      HDL Cholesterol: 44 mg/dL      Total Cholesterol: 163 mg/dL     [] Patient has completed an advance directive  [x] Patient has NOT completed an advanced directive  [] Patient has a documented healthcare surrogate  [x] Patient does NOT have a documented healthcare surrogate  [] Discussed the importance of establishing and updating an advanced directive. Patient has questions at this time and those were answered. [x] Discussed the importance of establishing and updating an advanced directive. Patient does NOT have questions at this time. Discussed with: [x] Patient            [] Family             [] Other caregiver    Patient's medications, allergies, past medical, surgical, social and family histories were reviewed and updated asappropriate on 3/28/2022 at 2:38 PM.    ROS:  Review of Systems    All other systems reviewed and are negative except as noted above on 3/28/2022 at 2:38 PM. Additional review of systems may be scanned into the media section ofthis medical record. Any responses requiring further intervention were pursued. Past Medical History:   Diagnosis Date    CAD (coronary artery disease)     Hypertension     Other disorders of kidney and ureter     stones    Pericarditis      Family History   Problem Relation Age of Onset    Diabetes Mother     High Blood Pressure Mother      Social History     Socioeconomic History    Marital status:      Spouse name: Not on file    Number of children: Not on file    Years of education: Not on file    Highest education level: Not on file   Occupational History    Not on file   Tobacco Use    Smoking status: Never Smoker    Smokeless tobacco: Never Used   Substance and Sexual Activity    Alcohol use: No     Alcohol/week: 0.0 standard drinks    Drug use: No    Sexual activity: Yes     Partners: Female   Other Topics Concern    Not on file   Social History Narrative    Not on file     Social Determinants of Health     Financial Resource Strain: Low Risk     Difficulty of Paying Living Expenses: Not hard at all   Food Insecurity: No Food Insecurity    Worried About Running Out of Food in the Last Year: Never true    Mohini of Food in the Last Year: Never true   Transportation Needs:     Lack of Transportation (Medical):  Not on file    Lack of Transportation following:    Height as of this encounter: 5' 10\" (1.778 m). Weight as of this encounter: 206 lb 12.8 oz (93.8 kg). Vitals:    03/28/22 1429   BP: (!) 134/90   Site: Right Upper Arm   Position: Sitting   Cuff Size: Large Adult   Pulse: 64   Resp: 18   SpO2: 97%   Weight: 206 lb 12.8 oz (93.8 kg)   Height: 5' 10\" (1.778 m)       Physical Exam  Vitals and nursing note reviewed. Constitutional:       General: He is not in acute distress. Appearance: He is well-developed. He is not diaphoretic. HENT:      Head: Normocephalic and atraumatic. Right Ear: External ear normal.      Left Ear: External ear normal.      Nose: Nose normal.   Eyes:      General: Lids are normal. No scleral icterus. Right eye: No discharge. Left eye: No discharge. Pupils: Pupils are equal, round, and reactive to light. Neck:      Thyroid: No thyromegaly. Vascular: No JVD. Cardiovascular:      Rate and Rhythm: Normal rate and regular rhythm. Heart sounds: Murmur heard. Systolic murmur is present with a grade of 3/6. Pulmonary:      Effort: Pulmonary effort is normal. No respiratory distress. Breath sounds: Normal breath sounds. Abdominal:      Palpations: Abdomen is soft. There is no hepatomegaly or splenomegaly. Tenderness: There is no abdominal tenderness. Musculoskeletal:      Left shoulder: Decreased range of motion (Can abduct 120 degrees). Right lower leg: No edema. Left lower leg: No edema. Skin:     General: Skin is warm and dry. Coloration: Skin is not pale. Findings: No erythema or rash. Comments: Turgor normal   Neurological:      Mental Status: He is oriented to person, place, and time. Psychiatric:         Mood and Affect: Mood normal.         Behavior: Behavior normal.         Thought Content:  Thought content normal.         Judgment: Judgment normal.           Lab Review   Nurse Only on 03/25/2022   Component Date Value    PSA 03/25/2022 2.47     Cholesterol, Total 03/25/2022 163     Triglycerides 03/25/2022 156*    HDL 03/25/2022 44     LDL Calculated 03/25/2022 88     VLDL Cholesterol Calcula* 03/25/2022 31     Sodium 03/25/2022 143     Potassium 03/25/2022 4.5     Chloride 03/25/2022 106     CO2 03/25/2022 21     Anion Gap 03/25/2022 16     Glucose 03/25/2022 92     BUN 03/25/2022 17     CREATININE 03/25/2022 0.9     GFR Non- 03/25/2022 >60     GFR  03/25/2022 >60     Calcium 03/25/2022 10.0     Total Protein 03/25/2022 7.1     Albumin 03/25/2022 4.8     Albumin/Globulin Ratio 03/25/2022 2.1     Total Bilirubin 03/25/2022 0.7     Alkaline Phosphatase 03/25/2022 73     ALT 03/25/2022 21     AST 03/25/2022 17     WBC 03/25/2022 4.1     RBC 03/25/2022 5.28     Hemoglobin 03/25/2022 16.1     Hematocrit 03/25/2022 47.1     MCV 03/25/2022 89.1     MCH 03/25/2022 30.4     MCHC 03/25/2022 34.1     RDW 03/25/2022 13.3     Platelets 10/10/9464 154     MPV 03/25/2022 8.2     Neutrophils % 03/25/2022 57.2     Lymphocytes % 03/25/2022 29.7     Monocytes % 03/25/2022 7.5     Eosinophils % 03/25/2022 4.8     Basophils % 03/25/2022 0.8     Neutrophils Absolute 03/25/2022 2.4     Lymphocytes Absolute 03/25/2022 1.2     Monocytes Absolute 03/25/2022 0.3     Eosinophils Absolute 03/25/2022 0.2     Basophils Absolute 03/25/2022 0.0             ASSESSMENT PLAN      Diagnosis Orders   1. Annual physical exam     2. Benign essential HTN     3. Paroxysmal atrial fibrillation (HCC)     4. Coronary artery disease due to lipid rich plaque     5. Lumbosacral radiculopathy at L5     6. Pure hypercholesterolemia     7. Rotator cuff impingement syndrome of left shoulder     Blood pressure fluctuates but he thinks if he loses weight that will help. Clinically in sinus rhythm. No symptoms of coronary insufficiency. No flares of sciatica.   He is recovering his motion from left rotator cuff tear.  Lipids will be monitored based upon levels requiring treatment and other cardiac risks. Medications for hyperlipidemia and hypertriglyceridemia as listed on the medication list will be changed as necessary to reach control parameters. Lab review acceptable. Follow-up 1 year. Patient should call the office immediately with new or ongoing signs or symptoms or worsening, or proceed to the emergency room. No changes in past medical history, past surgical history, social history, or family history were noted during the patient encounter unless specifically listed above. All updates of past medical history, past surgical history, social history, or family history were reviewed personally by me during the office visit. All problems listed in the assessment are stable unless noted otherwise. Medication profile reviewed personally by me during the visit. Medication side effects and possible impairments from medications were discussed as applicable. This document was prepared by a combination of typing and transcription through a voice recognition software. I, Dr. Renny Romero, personally performed the services described in this documentation, as scribed by the above signed scribe in my presence, and it is both accurate and complete. I agree with the ROS and Past Histories independently gathered by the clinical support staff and the remaining scribed note accurately describes my personal service to the patient. 3/28/2022    3:17 PM              Scribe attestation: Mohinder Del Castillo RN, am scribing for and in the presence of Remigio Woods MD. Electronically signed by Patricia Elder RN on 3/28/2022 at 2:38 PM      Provider attestation:     I, Dr. Renny Romero, personally performed the services described in this documentation, as scribed by the above signed scribe in my presence, and it is both accurate and complete.  I agree with the ROS and Past Histories independently gathered by the clinical support staff and the remaining scribed note accurately describes my personal service to the patient.       3/28/2022    3:17 PM

## 2022-03-31 ENCOUNTER — HOSPITAL ENCOUNTER (OUTPATIENT)
Dept: PHYSICAL THERAPY | Age: 62
Setting detail: THERAPIES SERIES
Discharge: HOME | End: 2022-03-31
Payer: COMMERCIAL

## 2022-03-31 PROCEDURE — 97140 MANUAL THERAPY 1/> REGIONS: CPT | Performed by: PHYSICAL THERAPY ASSISTANT

## 2022-03-31 PROCEDURE — 97112 NEUROMUSCULAR REEDUCATION: CPT | Performed by: PHYSICAL THERAPY ASSISTANT

## 2022-03-31 PROCEDURE — 97110 THERAPEUTIC EXERCISES: CPT | Performed by: PHYSICAL THERAPY ASSISTANT

## 2022-03-31 NOTE — FLOWSHEET NOTE
Formerly [facility], [facility], [address] [place] [address] Cynthia Bangura, [address]  Phone: [phone], [phone]    Physical Therapy Treatment Note/ Progress Report:     Date:  3/31/2022    Patient Name:  Blayne Carty. :  1960  MRN: [id number]  Restrictions/Precautions:    Medical/Treatment Diagnosis Information:  Diagnosis: Left Rotator Cuff Repair on 1/10/22  Treatment Diagnosis: N78.929  Insurance/Certification information:  PT Insurance Information: [employer]  Physician Information:  Referring Practitioner: Bronwyn Jasso MD  Has the plan of care been signed (Y/N):        []  Yes  [x]  No     Date of Patient follow up with Physician: 3/8/2022    Is this a Progress Report:     []  Yes  [x]  No      If Yes:  Date Range for reporting period:  Initial Eval: 2022  Beginnin2022 --- Endin2022  Beginnin22 --- Ending 3/24/22  Beginning: 3/7/22 --- Ending 22        Progress report will be due (10 Rx or 30 days whichever is less): 7022    Recertification will be due (POC Duration  / 90 days whichever is less): 2022     Visit # Insurance Allowable Auth Required   In Person 20 24 visits 22-22 []  Yes     []  No    [facility]   []  Yes     []  No    Total 20         Functional Scale: Quick Dash: 73% (Total Number Sum: 43/55)   Date assessed: 2022   Functional Scale: Quick Dash: 32% (Total = 25/55)    Date assessed:  2022  Functional Scale: Quick Dash: 16% (Total = 18/55)    Date assessed:   3/7/2022        Latex Allergy:  [x]NO      []YES      Preferred Language for Healthcare:   [x]English       []other:    Pain level: 0/10 currently     SUBJECTIVE: (9 weeks PO on 3/7/22) Doing well. 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       OBJECTIVE: See eval   Observation:    Test measurements:      RESTRICTIONS/PRECAUTIONS: HTN, Hx of heart problems                   Exercises/Interventions:   Therapeutic Ex (26406)  Therapeutic Activity (99130)  NMR re-education (65233) Sets/Reps Notes/CUES   Pulley 5 min         Towel IR stretch  10x10\"    Doorway ER stretch  10x10\"    Crossover stretch  10x10\"    Wall walks with ecc lower 5\" x20    Functional lift 4#  x20         Ball on wall  x20 4 way B red plyo     Table push up  x20    PRE Flexion / scaption  x20 / x20 2#   Flexion to shelf  x20 4#   Tricep extension 25# x30         TB rows  TB ext Black x30  Black x20    TB IR   TB ER  Green  3x10  Green 3x10              Elbow flexion AROM x15 ea of 3 way 5#   Body blade 3x30 sec ea Elbow at side        Cane press 2# x30    Cane flexion 2# x20    Cane ER 10\" x10 90/90 position        Supine punch x30 4#   Supine flexion  3# x30    SL ER /  / Long lever ABD  2# x30 // 1# x20    PNF supine D2 1# x20    Bent over row  Bent over ext 4# x30  4# x30              Manual Intervention (31903)     Shoulder PROM- gentle 10' Flexion to 90°, abd to 90°, IR, ER to neutral                               Medbridge access code: [email or web address] Door  Access Code: [email or web address] Door  URL: [email or web address]  Date: 01/24/2022  Prepared by: Brannon Howard    Exercises  Seated Scapular Retraction - 1-2 x daily - 7 x weekly - 3 sets - 10 reps - 5 s hold  Seated Shoulder Shrugs - 1-2 x daily - 7 x weekly - 3 sets - 10 reps  Seated Neck Sidebending Stretch - 1-2 x daily - 7 x weekly - 3 reps - 30s hold  Wrist AROM Radial Ulnar Deviation - 1-2 x daily - 7 x weekly - 3 sets - 10 reps  Forearm Strengthening with Ball Squeeze - 1-2 x daily - 7 x weekly - 3 sets - 10 reps  Seated Wrist Extension AROM - 1-2 x daily - 7 x weekly - 3 sets - 10 reps  Wrist Flexion Extension AROM with Fingers Curled and Palm Down - 1-2 x daily - 7 x weekly - 3 sets - 10 reps  Circular Shoulder Pendulum with Table Support - 1-2 x daily - 7 x weekly - 3 sets - 10 reps  Flexion-Extension Shoulder Pendulum with Table Support - 1-2 x daily - 7 x weekly - 3 sets - 10 reps  Seated Elbow Flexion Extension AROM - 1-2 x daily - 7 x weekly - 3 sets - 10 reps                  Patient Education 10'  Pt education with HEP and progression of PT along with compliance with HEP to aide with formal PT for optimal outcomes. Therapeutic Exercise and NMR EXR  [x] (33265) Provided verbal/tactile cueing for activities related to strengthening, flexibility, endurance, ROM  for improvements in scapular, scapulothoracic and UE control with self care, reaching, carrying, lifting, house/yardwork, driving/computer work. [x] (87242) Provided verbal/tactile cueing for activities related to improving balance, coordination, kinesthetic sense, posture, motor skill, proprioception  to assist with  scapular, scapulothoracic and UE control with self care, reaching, carrying, lifting, house/yardwork, driving/computer work. Therapeutic Activities:    [x] (24609 or 14731) Provided verbal/tactile cueing for activities related to improving balance, coordination, kinesthetic sense, posture, motor skill, proprioception and motor activation to allow for proper function of scapular, scapulothoracic and UE control with self care, carrying, lifting, driving/computer work.      Home Exercise Program:    [x] (32200) Reviewed/Progressed HEP activities related to strengthening, flexibility, endurance, ROM of scapular, scapulothoracic and UE control with self care, reaching, carrying, lifting, house/yardwork, driving/computer work  [x] (81722) Reviewed/Progressed HEP activities related to improving balance, coordination, kinesthetic sense, posture, motor skill, proprioception of scapular, scapulothoracic and UE control with self care, reaching, carrying, lifting, house/yardwork, driving/computer work      Manual Treatments:  PROM / STM / Oscillations-Mobs:  G-I, II, III, IV (PA's, Inf., Post.)  [x] (90673) Provided manual therapy to mobilize soft tissue/joints of cervical/CT, scapular GHJ and UE for the purpose of modulating pain, promoting relaxation,  increasing ROM, reducing/eliminating soft tissue swelling/inflammation/restriction, improving soft tissue extensibility and allowing for proper ROM for normal function with self care, reaching, carrying, lifting, house/yardwork, driving/computer work    Modalities:      Charges:  Timed Code Treatment Minutes: 54'   Total Treatment Minutes:  55'   BWC:  TE TIME:  NMR TIME:  MANUAL TIME:  UNTIMED MINUTES:  Medicare Total:         [] EVAL (LOW) 76908 (typically 20 minutes face-to-face)  [] EVAL (MOD) 21113 (typically 30 minutes face-to-face)  [] EVAL (HIGH) 37832 (typically 45 minutes face-to-face)  [] RE-EVAL     [x] VC(06827) x 2    [] IONTO  [x] NMR (47925) x 1  [] VASO  [x] Manual (60830) x 1    [] Other:  [] TA x      [] Mech Traction (40789)  [] ES(attended) (45842)     [] ES (un) (88353):    ASSESSMENT:  See eval    GOALS:   Short Term Goals: To be achieved in: 2 weeks  1. Independent in HEP and progression per patient tolerance, in order to prevent re-injury. [] Progressing: [x] Met: [] Not Met: [] Adjusted   2. Patient will have a decrease in pain to facilitate improvement in movement, function, and ADLs as indicated by Functional Deficits. [] Progressing: [x] Met: [] Not Met: [] Adjusted     Long Term Goals: To be achieved in: 12 weeks  1. Disability index score of 20% or less for the QuickDash/Oswestry to assist with reaching prior level of function. [] Progressing: [x] Met: [] Not Met: [] Adjusted   2. Patient will demonstrate increased AROM on the left to equal the opposite side bilaterally to allow for ability to reach overhead pain and restriction free as indicated by patients Functional Deficits. [x] Progressing: [] Met: [] Not Met: [] Adjusted   3.  Patient will demonstrate an increase in strength of the L UE to 4+/5 to allow for ability to perform all functional activities without restriction as indicated by patients this note will serve as a discharge from care along with most recent update on progress.

## 2022-04-07 ENCOUNTER — HOSPITAL ENCOUNTER (OUTPATIENT)
Dept: PHYSICAL THERAPY | Age: 62
Setting detail: THERAPIES SERIES
Discharge: HOME | End: 2022-04-07
Payer: COMMERCIAL

## 2022-04-07 PROCEDURE — 97112 NEUROMUSCULAR REEDUCATION: CPT | Performed by: PHYSICAL THERAPY ASSISTANT

## 2022-04-07 PROCEDURE — 97110 THERAPEUTIC EXERCISES: CPT | Performed by: PHYSICAL THERAPY ASSISTANT

## 2022-04-07 PROCEDURE — 97140 MANUAL THERAPY 1/> REGIONS: CPT | Performed by: PHYSICAL THERAPY ASSISTANT

## 2022-04-07 NOTE — FLOWSHEET NOTE
[facility], [address] [place] [address] Cynthia Bangura, [address]  Phone: [phone], [phone]    Date: 2022          Patient Name; :  Jenae Diaz.; 1960   Dx/ICD Code:  Left Rotator Cuff Repair on 1/10/22  Treatment Diagnosis: M75.102      Physician: Van        Total PT Visits: 21     Measures Previous Current   Pain (0-10)  0   Disability %  14%        AROM     Shoulder flexion  145   Shoulder ER  C7   Shoulder IR  L1   Strength     Shoulder flexion  4   Shoulder ER  4-   Shoulder IR  4               Specific Functional Improvements & Impressions:  Patient reports feeling 80% improved at this point. Patient still challenged with strength but each week reports improvement. He would benefit from 1-2 visits to get him independent in HEP and then discuss DC. Plan & Recommendations:  [x] Continue rehabilitation due to objective improvement and continued functional deficits with frequency and duration: 1-2 visits then DC to HEP  [] Progress toward  []GAP, []Work Conditioning, []Independent HEP   [] Discharge due to   [] All goals achieved, [] Maximized \"medical necessity\" [] No subjective or objective improvements      Electronically signed by:  Martinez Alarcon, PTA; Major Hanks PT,MPT, ATC, cert DN      Therapy Plan of Care Re-Certification  This patient has been re-evaluated for physical therapy services and for therapy to continue, Medicare, Medicaid and other insurances require periodic physician review of the treatment plan.  Please review the above re-evaluation and verify that you agree with plan of care as established above by signing the attached document and return it to our office or note changes to established plan below  [] Follow treatment plan as above [] Discontinue physical therapy  [] Change plan to:                                 __________________________________________________    Physician Signature:____________________________________ Date:____________  By signing above, therapists plan is approved by physician    If you have any questions or concerns, please don't hesitate to call. Thank you for your referral.    [facility] Therapy office  ([phone])     Fax [phone]     Physical Therapy Treatment Note/ Progress Report:     Date:  2022    Patient Name:  Tima Nuñez     :  1960  MRN: [id number]  Restrictions/Precautions:    Medical/Treatment Diagnosis Information:  Diagnosis: Left Rotator Cuff Repair on 1/10/22  Treatment Diagnosis: D87.468  Insurance/Certification information:  PT Insurance Information: [employer]  Physician Information:  Referring Practitioner: Marisol Leslie MD  Has the plan of care been signed (Y/N):        []  Yes  [x]  No     Date of Patient follow up with Physician: 3/8/2022    Is this a Progress Report:     []  Yes  [x]  No      If Yes:  Date Range for reporting period:  Initial Eval: 2022  Beginnin2022 --- Endin2022  Beginnin22 --- Ending 3/24/22  Beginning: 3/7/22 --- Ending 18        RECERT  Beginnin --- Ending 22      Progress report will be due (10 Rx or 30 days whichever is less): 3/4/7290    Recertification will be due (POC Duration  / 90 days whichever is less): 2022     Visit # Insurance Allowable Auth Required   In Person 21 24 visits 22-22 []  Yes     []  No    Tele Health   []  Yes     []  No    Total 21         Functional Scale: Quick Dash: 73% (Total Number Sum: 43/55)   Date assessed: 2022   Functional Scale: Quick Dash: 32% (Total = 25/55)    Date assessed:  2022  Functional Scale: Quick Dash: 16% (Total = 18/55)    Date assessed:   3/7/2022  Functional Scale: Quick Dash: 7% (Total = 14)     22      Latex Allergy:  [x]NO      []YES      Preferred Language for Healthcare:   [x]English       []other:    Pain level: 0/10 currently     SUBJECTIVE: (9 weeks PO on 3/7/22) See updated progress note.    OBJECTIVE: See eval   Observation:    Test measurements:      RESTRICTIONS/PRECAUTIONS: HTN, Hx of heart problems                   Exercises/Interventions:   Therapeutic Ex (95873)  Therapeutic Activity (89931)  NMR re-education (42911) Sets/Reps Notes/CUES   Pulley 5 min         Towel IR stretch  10x10\"    Doorway ER stretch  10x10\"    Crossover stretch  10x10\"    Wall walks with ecc lower 5\" x20    Functional lift 4#  x20         Ball on wall  x20 4 way B Red plyo     Table push up  x25    PRE Flexion / scaption  x20 / x20 2#   Flexion to shelf  x20 4#   Tricep extension 30# x30         TB rows mid and high  TB ext 40# x30  40# x20    TB IR   TB ER  Blue  3x10  Blue 3x10              Elbow flexion AROM x15 ea of 3 way 5#   Body blade 3x30 sec ea Elbow at side        Cane press    Cane flexion    Cane ER 90/90 position        Supine punch x30 5#   Supine flexion  5# x30    SL ER /  / Long lever ABD  5# x30 // 3# x20    PNF supine D2 2# x20    Bent over row  Bent over ext 5# x30  5# x30              Manual Intervention (10780)     Shoulder PROM- gentle 10' Flexion to 90°, abd to 90°, IR, ER to neutral                               Fix That Bug access code: Richard Metcalf  Access Code: Richard Metcalf  URL: [email or web address]  Date: 01/24/2022  Prepared by: Andrew Briceing    Exercises  Seated Scapular Retraction - 1-2 x daily - 7 x weekly - 3 sets - 10 reps - 5 s hold  Seated Shoulder Shrugs - 1-2 x daily - 7 x weekly - 3 sets - 10 reps  Seated Neck Sidebending Stretch - 1-2 x daily - 7 x weekly - 3 reps - 30s hold  Wrist AROM Radial Ulnar Deviation - 1-2 x daily - 7 x weekly - 3 sets - 10 reps  Forearm Strengthening with Ball Squeeze - 1-2 x daily - 7 x weekly - 3 sets - 10 reps  Seated Wrist Extension AROM - 1-2 x daily - 7 x weekly - 3 sets - 10 reps  Wrist Flexion Extension AROM with Fingers Curled and Palm Down - 1-2 x daily - 7 x weekly - 3 sets - 10 reps  Circular Shoulder Pendulum with Table Support - 1-2 x daily - 7 x weekly - 3 sets - 10 reps  Flexion-Extension Shoulder Pendulum with Table Support - 1-2 x daily - 7 x weekly - 3 sets - 10 reps  Seated Elbow Flexion Extension AROM - 1-2 x daily - 7 x weekly - 3 sets - 10 reps                  Patient Education 10'  Pt education with HEP and progression of PT along with compliance with HEP to aide with formal PT for optimal outcomes. Therapeutic Exercise and NMR EXR  [x] (45307) Provided verbal/tactile cueing for activities related to strengthening, flexibility, endurance, ROM  for improvements in scapular, scapulothoracic and UE control with self care, reaching, carrying, lifting, house/yardwork, driving/computer work. [x] (56696) Provided verbal/tactile cueing for activities related to improving balance, coordination, kinesthetic sense, posture, motor skill, proprioception  to assist with  scapular, scapulothoracic and UE control with self care, reaching, carrying, lifting, house/yardwork, driving/computer work. Therapeutic Activities:    [x] (42966 or 59470) Provided verbal/tactile cueing for activities related to improving balance, coordination, kinesthetic sense, posture, motor skill, proprioception and motor activation to allow for proper function of scapular, scapulothoracic and UE control with self care, carrying, lifting, driving/computer work.      Home Exercise Program:    [x] (68880) Reviewed/Progressed HEP activities related to strengthening, flexibility, endurance, ROM of scapular, scapulothoracic and UE control with self care, reaching, carrying, lifting, house/yardwork, driving/computer work  [x] (75666) Reviewed/Progressed HEP activities related to improving balance, coordination, kinesthetic sense, posture, motor skill, proprioception of scapular, scapulothoracic and UE control with self care, reaching, carrying, lifting, house/yardwork, driving/computer work      Manual Treatments:  PROM / STM / Oscillations-Mobs:  G-I, II, III, IV (PA's, Inf., Post.)  [x] (23194) Provided manual therapy to mobilize soft tissue/joints of cervical/CT, scapular GHJ and UE for the purpose of modulating pain, promoting relaxation,  increasing ROM, reducing/eliminating soft tissue swelling/inflammation/restriction, improving soft tissue extensibility and allowing for proper ROM for normal function with self care, reaching, carrying, lifting, house/yardwork, driving/computer work    Modalities:      Charges:  Timed Code Treatment Minutes: 54'   Total Treatment Minutes:  55'   BWC:  TE TIME:  NMR TIME:  MANUAL TIME:  UNTIMED MINUTES:  Medicare Total:         [] EVAL (LOW) 89413 (typically 20 minutes face-to-face)  [] EVAL (MOD) 11354 (typically 30 minutes face-to-face)  [] EVAL (HIGH) 73957 (typically 45 minutes face-to-face)  [] RE-EVAL     [x] LI(56107) x 2    [] IONTO  [x] NMR (81869) x 1  [] VASO  [x] Manual (52708) x 1    [] Other:  [] TA x      [] Mech Traction (21201)  [] ES(attended) (75904)     [] ES (un) (91141):    ASSESSMENT:  See eval    GOALS:   Short Term Goals: To be achieved in: 2 weeks  1. Independent in HEP and progression per patient tolerance, in order to prevent re-injury. [] Progressing: [x] Met: [] Not Met: [] Adjusted   2. Patient will have a decrease in pain to facilitate improvement in movement, function, and ADLs as indicated by Functional Deficits. [] Progressing: [x] Met: [] Not Met: [] Adjusted     Long Term Goals: To be achieved in: 12 weeks  1. Disability index score of 20% or less for the QuickDash/Oswestry to assist with reaching prior level of function. [] Progressing: [x] Met: [] Not Met: [] Adjusted   2. Patient will demonstrate increased AROM on the left to equal the opposite side bilaterally to allow for ability to reach overhead pain and restriction free as indicated by patients Functional Deficits. [x] Progressing: [] Met: [] Not Met: [] Adjusted   3.  Patient will demonstrate an increase in strength of the L UE to 4+/5 to allow for ability to perform all functional activities without restriction as indicated by patients Functional Deficits. [x] Progressing: [] Met: [] Not Met: [] Adjusted   4. Patient will return to all transfers, work activities, and functional activities without increased symptoms or restriction. [x] Progressing: [] Met: [] Not Met: [] Adjusted   5. Patient will have 0/10 pain with ADL's.  [] Progressing: [x] Met: [] Not Met: [] Adjusted   6. 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 [x] Progressing: [] Met: [] Not Met: [] Adjusted     Overall Progression Towards Functional goals/ Treatment Progress Update:  [x] Patient is progressing as expected towards functional goals listed. [] Progression is slowed due to complexities/Impairments listed. [] Progression has been slowed due to co-morbidities.   [] Plan just implemented, too soon to assess goals progression <30days   [] Goals require adjustment due to lack of progress  [] Patient is not progressing as expected and requires additional follow up with physician  [] Other    Prognosis for POC: [x] Good [] Fair  [] Poor    Patient requires continued skilled intervention: [x] Yes  [] No    Treatment/Activity Tolerance:  [x] Patient able to complete treatment  [] Patient limited by fatigue  [] Patient limited by pain    [] Patient limited by other medical complications  [] Other:     Return to Play: (if applicable)   []  Stage 1: Intro to Strength   []  Stage 2: Return to Run and Strength   []  Stage 3: Return to Jump and Strength   []  Stage 4: Dynamic Strength and Agility   []  Stage 5: Sport Specific Training     []  Ready to Return to Play, Meets All Above Stages   []  Not Ready for Return to Sports   Comments:                         PLAN: See eval  [x] Continue per plan of care [] Alter current plan (see comments above)  [] Plan of care initiated [] Hold pending MD visit [] Discharge    Electronically signed by: Farnaz Shah , ROLA; Matthew Robb PT,MPT, ATC, cert DN    Note: If patient does not return for scheduled/ recommended follow up visits, this note will serve as a discharge from care along with most recent update on progress.

## 2022-04-14 ENCOUNTER — HOSPITAL ENCOUNTER (OUTPATIENT)
Dept: PHYSICAL THERAPY | Age: 62
Setting detail: THERAPIES SERIES
Discharge: HOME | End: 2022-04-14
Payer: COMMERCIAL

## 2022-04-14 PROCEDURE — 97112 NEUROMUSCULAR REEDUCATION: CPT | Performed by: PHYSICAL THERAPY ASSISTANT

## 2022-04-14 PROCEDURE — 97140 MANUAL THERAPY 1/> REGIONS: CPT | Performed by: PHYSICAL THERAPY ASSISTANT

## 2022-04-14 PROCEDURE — 97110 THERAPEUTIC EXERCISES: CPT | Performed by: PHYSICAL THERAPY ASSISTANT

## 2022-04-28 ENCOUNTER — APPOINTMENT (OUTPATIENT)
Dept: PHYSICAL THERAPY | Age: 62
End: 2022-04-28
Payer: COMMERCIAL

## 2022-08-29 RX ORDER — MELOXICAM 15 MG/1
15 TABLET ORAL DAILY
Qty: 30 TABLET | Refills: 5 | Status: SHIPPED | OUTPATIENT
Start: 2022-08-29

## 2022-09-13 RX ORDER — PANTOPRAZOLE SODIUM 40 MG/1
TABLET, DELAYED RELEASE ORAL
Qty: 90 TABLET | Refills: 3 | Status: SHIPPED | OUTPATIENT
Start: 2022-09-13

## 2023-03-07 RX ORDER — MELOXICAM 15 MG/1
15 TABLET ORAL DAILY
Qty: 30 TABLET | Refills: 5 | Status: SHIPPED | OUTPATIENT
Start: 2023-03-07

## 2023-03-21 DIAGNOSIS — Z12.5 SCREENING FOR PROSTATE CANCER: ICD-10-CM

## 2023-03-21 DIAGNOSIS — I10 BENIGN ESSENTIAL HTN: Primary | ICD-10-CM

## 2023-03-21 DIAGNOSIS — Z13.29 SCREENING FOR THYROID DISORDER: ICD-10-CM

## 2023-03-21 DIAGNOSIS — E78.00 PURE HYPERCHOLESTEROLEMIA: ICD-10-CM

## 2023-03-29 ENCOUNTER — OFFICE VISIT (OUTPATIENT)
Dept: FAMILY MEDICINE CLINIC | Age: 63
End: 2023-03-29
Payer: COMMERCIAL

## 2023-03-29 VITALS
BODY MASS INDEX: 29.84 KG/M2 | DIASTOLIC BLOOD PRESSURE: 80 MMHG | HEART RATE: 65 BPM | WEIGHT: 208 LBS | SYSTOLIC BLOOD PRESSURE: 128 MMHG | OXYGEN SATURATION: 94 %

## 2023-03-29 DIAGNOSIS — Z12.5 SCREENING FOR PROSTATE CANCER: ICD-10-CM

## 2023-03-29 DIAGNOSIS — Z13.29 SCREENING FOR THYROID DISORDER: ICD-10-CM

## 2023-03-29 DIAGNOSIS — I95.9 HYPOTENSION, UNSPECIFIED HYPOTENSION TYPE: ICD-10-CM

## 2023-03-29 DIAGNOSIS — I25.10 CORONARY ARTERY DISEASE DUE TO LIPID RICH PLAQUE: ICD-10-CM

## 2023-03-29 DIAGNOSIS — E78.00 PURE HYPERCHOLESTEROLEMIA: ICD-10-CM

## 2023-03-29 DIAGNOSIS — I48.0 PAROXYSMAL ATRIAL FIBRILLATION (HCC): ICD-10-CM

## 2023-03-29 DIAGNOSIS — I10 BENIGN ESSENTIAL HTN: ICD-10-CM

## 2023-03-29 DIAGNOSIS — I25.83 CORONARY ARTERY DISEASE DUE TO LIPID RICH PLAQUE: ICD-10-CM

## 2023-03-29 DIAGNOSIS — Z00.00 ANNUAL PHYSICAL EXAM: Primary | ICD-10-CM

## 2023-03-29 PROCEDURE — 3079F DIAST BP 80-89 MM HG: CPT | Performed by: FAMILY MEDICINE

## 2023-03-29 PROCEDURE — 99396 PREV VISIT EST AGE 40-64: CPT | Performed by: FAMILY MEDICINE

## 2023-03-29 PROCEDURE — 3074F SYST BP LT 130 MM HG: CPT | Performed by: FAMILY MEDICINE

## 2023-03-29 RX ORDER — LOSARTAN POTASSIUM 25 MG/1
25 TABLET ORAL DAILY
COMMUNITY

## 2023-03-29 SDOH — ECONOMIC STABILITY: FOOD INSECURITY: WITHIN THE PAST 12 MONTHS, YOU WORRIED THAT YOUR FOOD WOULD RUN OUT BEFORE YOU GOT MONEY TO BUY MORE.: NEVER TRUE

## 2023-03-29 SDOH — ECONOMIC STABILITY: FOOD INSECURITY: WITHIN THE PAST 12 MONTHS, THE FOOD YOU BOUGHT JUST DIDN'T LAST AND YOU DIDN'T HAVE MONEY TO GET MORE.: NEVER TRUE

## 2023-03-29 SDOH — ECONOMIC STABILITY: HOUSING INSECURITY
IN THE LAST 12 MONTHS, WAS THERE A TIME WHEN YOU DID NOT HAVE A STEADY PLACE TO SLEEP OR SLEPT IN A SHELTER (INCLUDING NOW)?: NO

## 2023-03-29 SDOH — ECONOMIC STABILITY: INCOME INSECURITY: HOW HARD IS IT FOR YOU TO PAY FOR THE VERY BASICS LIKE FOOD, HOUSING, MEDICAL CARE, AND HEATING?: NOT HARD AT ALL

## 2023-03-29 ASSESSMENT — PATIENT HEALTH QUESTIONNAIRE - PHQ9
1. LITTLE INTEREST OR PLEASURE IN DOING THINGS: 0
SUM OF ALL RESPONSES TO PHQ QUESTIONS 1-9: 0
SUM OF ALL RESPONSES TO PHQ QUESTIONS 1-9: 0
SUM OF ALL RESPONSES TO PHQ9 QUESTIONS 1 & 2: 0
SUM OF ALL RESPONSES TO PHQ QUESTIONS 1-9: 0
SUM OF ALL RESPONSES TO PHQ QUESTIONS 1-9: 0
2. FEELING DOWN, DEPRESSED OR HOPELESS: 0

## 2023-03-29 NOTE — PATIENT INSTRUCTIONS

## 2023-03-29 NOTE — PROGRESS NOTES
Chief Complaint   Patient presents with    Annual Exam    Hypertension        Internal Administration   First Dose COVID-19, YAN cortes, Primary or Immunocompromised, (age 12y+), IM, 100 mcg/0.5mL  2021   Second Dose COVID-19, MODERNA BLUE border, Primary or Immunocompromised, (age 12y+), IM, 100 mcg/0.5mL   04/15/2021       Last COVID Lab No results found for: SARS-COV-2, SARS-COV-2 RNA, SARS-COV-2, SARS-COV-2, SARS-COV-2 BY PCR, SARS-COV-2, SARS-COV-2, SARS-COV-2          Wt Readings from Last 3 Encounters:   23 208 lb (94.3 kg)   22 206 lb 12.8 oz (93.8 kg)   22 210 lb (95.3 kg)     BP Readings from Last 3 Encounters:   23 128/80   22 (!) 134/90   22 124/85      No results found for: LABA1C    HPI:  Jacques Yarbrough. is a 58 y.o. (: 1960) here today for    Patients blood pressure at home was 111/80. He states that it typically runs under 140/90. He continues to see cardiology. He states one day he dropped to 60/48 one day. He has noticed he has to really watch his water intake because if he doesn't drink enough water his pressure will drop. He states that he had a bad cough in January and since then he has had a lingering cough and every morning he coughs u phlegm. He states that every day he gets better. [] Patient has completed an advance directive  [x] Patient has NOT completed an advanced directive  [] Patient has a documented healthcare surrogate  [x] Patient does NOT have a documented healthcare surrogate  [] Discussed the importance of establishing and updating an advanced directive. Patient has questions at this time and those were answered. [x] Discussed the importance of establishing and updating an advanced directive. Patient does NOT have questions at this time.     Discussed with: [x] Patient            [] Family             [] Other caregiver    Patient's medications, allergies, past medical, surgical, social and family

## 2023-05-04 ENCOUNTER — NURSE ONLY (OUTPATIENT)
Dept: FAMILY MEDICINE CLINIC | Age: 63
End: 2023-05-04
Payer: COMMERCIAL

## 2023-05-04 DIAGNOSIS — Z13.29 SCREENING FOR THYROID DISORDER: ICD-10-CM

## 2023-05-04 DIAGNOSIS — I10 BENIGN ESSENTIAL HTN: ICD-10-CM

## 2023-05-04 DIAGNOSIS — E78.00 PURE HYPERCHOLESTEROLEMIA: ICD-10-CM

## 2023-05-04 DIAGNOSIS — Z12.5 SCREENING FOR PROSTATE CANCER: ICD-10-CM

## 2023-05-04 LAB
ALBUMIN SERPL-MCNC: 4.6 G/DL (ref 3.4–5)
ALBUMIN/GLOB SERPL: 2.1 {RATIO} (ref 1.1–2.2)
ALP SERPL-CCNC: 64 U/L (ref 40–129)
ALT SERPL-CCNC: 24 U/L (ref 10–40)
ANION GAP SERPL CALCULATED.3IONS-SCNC: 12 MMOL/L (ref 3–16)
AST SERPL-CCNC: 20 U/L (ref 15–37)
BASOPHILS # BLD: 0.1 K/UL (ref 0–0.2)
BASOPHILS NFR BLD: 1.3 %
BILIRUB SERPL-MCNC: 0.6 MG/DL (ref 0–1)
BUN SERPL-MCNC: 15 MG/DL (ref 7–20)
CALCIUM SERPL-MCNC: 9.3 MG/DL (ref 8.3–10.6)
CHLORIDE SERPL-SCNC: 105 MMOL/L (ref 99–110)
CHOLEST SERPL-MCNC: 156 MG/DL (ref 0–199)
CO2 SERPL-SCNC: 23 MMOL/L (ref 21–32)
CREAT SERPL-MCNC: 0.8 MG/DL (ref 0.8–1.3)
DEPRECATED RDW RBC AUTO: 13.7 % (ref 12.4–15.4)
EOSINOPHIL # BLD: 0.2 K/UL (ref 0–0.6)
EOSINOPHIL NFR BLD: 5 %
GFR SERPLBLD CREATININE-BSD FMLA CKD-EPI: >60 ML/MIN/{1.73_M2}
GLUCOSE SERPL-MCNC: 104 MG/DL (ref 70–99)
HCT VFR BLD AUTO: 50.2 % (ref 40.5–52.5)
HDLC SERPL-MCNC: 40 MG/DL (ref 40–60)
HGB BLD-MCNC: 16.6 G/DL (ref 13.5–17.5)
LDLC SERPL CALC-MCNC: 77 MG/DL
LYMPHOCYTES # BLD: 1.2 K/UL (ref 1–5.1)
LYMPHOCYTES NFR BLD: 25.9 %
MCH RBC QN AUTO: 30.3 PG (ref 26–34)
MCHC RBC AUTO-ENTMCNC: 33.1 G/DL (ref 31–36)
MCV RBC AUTO: 91.6 FL (ref 80–100)
MONOCYTES # BLD: 0.4 K/UL (ref 0–1.3)
MONOCYTES NFR BLD: 7.7 %
NEUTROPHILS # BLD: 2.8 K/UL (ref 1.7–7.7)
NEUTROPHILS NFR BLD: 60.1 %
PLATELET # BLD AUTO: 144 K/UL (ref 135–450)
PMV BLD AUTO: 8.6 FL (ref 5–10.5)
POTASSIUM SERPL-SCNC: 4.3 MMOL/L (ref 3.5–5.1)
PROT SERPL-MCNC: 6.8 G/DL (ref 6.4–8.2)
PSA SERPL DL<=0.01 NG/ML-MCNC: 2.39 NG/ML (ref 0–4)
RBC # BLD AUTO: 5.48 M/UL (ref 4.2–5.9)
SODIUM SERPL-SCNC: 140 MMOL/L (ref 136–145)
TRIGL SERPL-MCNC: 196 MG/DL (ref 0–150)
TSH SERPL DL<=0.005 MIU/L-ACNC: 1.96 UIU/ML (ref 0.27–4.2)
VLDLC SERPL CALC-MCNC: 39 MG/DL
WBC # BLD AUTO: 4.7 K/UL (ref 4–11)

## 2023-05-04 PROCEDURE — 36415 COLL VENOUS BLD VENIPUNCTURE: CPT | Performed by: FAMILY MEDICINE

## 2023-06-09 RX ORDER — MELOXICAM 15 MG/1
15 TABLET ORAL DAILY
Qty: 30 TABLET | Refills: 2 | OUTPATIENT
Start: 2023-06-09

## 2023-06-09 RX ORDER — MELOXICAM 15 MG/1
15 TABLET ORAL DAILY
Qty: 30 TABLET | Refills: 2 | Status: SHIPPED | OUTPATIENT
Start: 2023-06-09

## 2023-06-09 NOTE — TELEPHONE ENCOUNTER
Jenny Higgins. is requesting refill(s)   Last OV 5- (pertaining to medication)  LR 3-7-2023 (per medication requested)  Next office visit scheduled or attempted    If no, reason:  4- Yes

## 2023-10-05 RX ORDER — MELOXICAM 15 MG/1
15 TABLET ORAL DAILY
Qty: 30 TABLET | Refills: 0 | Status: SHIPPED | OUTPATIENT
Start: 2023-10-05

## 2023-10-05 NOTE — TELEPHONE ENCOUNTER
Refill Request     CONFIRM preferrred pharmacy with the patient. If Mail Order Rx - Pend for 90 day refill. Last Seen: Last Seen Department: 3/29/2023  Last Seen by PCP: Visit date not found    Last Written: 9/5/2023    If no future appointment scheduled, route STAFF MESSAGE with patient name to the [facility] for scheduling. Next Appointment:   Future Appointments   Date Time Provider [address] [place] [address]   4/2/2024 11:00 AM Kiana Michelle MD [address]       Message sent to [address] [place] [address] to schedule appt with patient?   NO      Requested Prescriptions     Pending Prescriptions Disp Refills    meloxicam (MOBIC) 15 MG tablet [Pharmacy Med Name: MELOXICAM 15MG TABLETS] 30 tablet 0     Sig: TAKE 1 TABLET BY MOUTH DAILY

## 2023-11-06 RX ORDER — MELOXICAM 15 MG/1
15 TABLET ORAL DAILY
Qty: 30 TABLET | Refills: 0 | Status: SHIPPED | OUTPATIENT
Start: 2023-11-06

## 2023-11-06 NOTE — TELEPHONE ENCOUNTER
Refill Request     CONFIRM preferrred pharmacy with the patient. If Mail Order Rx - Pend for 90 day refill. Last Seen: Last Seen Department: 3/29/2023  Last Seen by PCP: Visit date not found    Last Written: 10/5/2023    If no future appointment scheduled, route STAFF MESSAGE with patient name to the [facility] for scheduling. Next Appointment:   Future Appointments   Date Time Provider [facility] [address]   4/2/2024 11:00 AM Edvin Johnson MD [address]       Message sent to [address] [place] [address] to schedule appt with patient?   NO      Requested Prescriptions     Pending Prescriptions Disp Refills    meloxicam (MOBIC) 15 MG tablet [Pharmacy Med Name: MELOXICAM 15MG TABLETS] 30 tablet 0     Sig: TAKE 1 TABLET BY MOUTH DAILY

## 2023-11-21 RX ORDER — ROSUVASTATIN CALCIUM 40 MG/1
40 TABLET, COATED ORAL DAILY
Qty: 90 TABLET | Refills: 0 | Status: SHIPPED | OUTPATIENT
Start: 2023-11-21

## 2023-11-21 NOTE — TELEPHONE ENCOUNTER
Refill Request     CONFIRM preferrred pharmacy with the patient. If Mail Order Rx - Pend for 90 day refill. Last Seen: Last Seen Department: 3/29/2023  Last Seen by PCP: 3/29/2023    Last Written: 11/26/18    If no future appointment scheduled, route STAFF MESSAGE with patient name to the [facility] for scheduling. Next Appointment:   Future Appointments   Date Time Provider [address] [place] [address]   4/2/2024 11:00 AM Bryson Callejas MD [address]       Message sent to [address] [place] [address] to schedule appt with patient?   N/A      Requested Prescriptions     Pending Prescriptions Disp Refills    rosuvastatin (CRESTOR) 40 MG tablet 30 tablet 1     Sig: Take 1 tablet by mouth daily

## 2023-11-21 NOTE — TELEPHONE ENCOUNTER
Brenton Herrera, discussed how we show that the script hasnt been refilled since 2018, she states he is on this medication but did admit that the last bottle said it was a dr. Nolan Walter who prescribed it. Informed her that likely this other doctor had been refilling the medication for him but we can take over prescribing this medication. Informed script was sent.

## 2023-11-21 NOTE — TELEPHONE ENCOUNTER
Patient's wife, Lety Chappell, is calling stating they went to [facility] and they have no refill request for the medication. Stated this will be day 5 patient has been out of this medication. Please call when this is sent.

## 2023-12-06 RX ORDER — PANTOPRAZOLE SODIUM 40 MG/1
40 TABLET, DELAYED RELEASE ORAL DAILY
Qty: 90 TABLET | Refills: 0 | Status: SHIPPED | OUTPATIENT
Start: 2023-12-06

## 2023-12-06 RX ORDER — MELOXICAM 15 MG/1
15 TABLET ORAL DAILY
Qty: 30 TABLET | Refills: 2 | Status: SHIPPED | OUTPATIENT
Start: 2023-12-06

## 2023-12-06 NOTE — TELEPHONE ENCOUNTER
Refill Request     CONFIRM preferrred pharmacy with the patient. If Mail Order Rx - Pend for 90 day refill. Last Seen: Last Seen Department: 3/29/2023  Last Seen by PCP: 3/29/2023    Last Written: 11/6/23 meloxicam  9/13/22 pantoprazole    If no future appointment scheduled, route STAFF MESSAGE with patient name to the [facility] for scheduling. Next Appointment:   Future Appointments   Date Time Provider [facility] [address]   4/2/2024 11:00 AM Lucie Del Rosario MD [address]       Message sent to [address] [place] [address] to schedule appt with patient?   N/A      Requested Prescriptions     Pending Prescriptions Disp Refills    meloxicam (MOBIC) 15 MG tablet 30 tablet 2     Sig: Take 1 tablet by mouth daily    pantoprazole (PROTONIX) 40 MG tablet 90 tablet 0     Sig: Take 1 tablet by mouth daily

## 2024-01-23 ENCOUNTER — HOSPITAL ENCOUNTER (EMERGENCY)
Age: 64
Discharge: HOME | End: 2024-01-23
Attending: EMERGENCY MEDICINE
Payer: COMMERCIAL

## 2024-01-23 ENCOUNTER — APPOINTMENT (OUTPATIENT)
Dept: GENERAL RADIOLOGY | Age: 64
End: 2024-01-23
Payer: COMMERCIAL

## 2024-01-23 VITALS
SYSTOLIC BLOOD PRESSURE: 116 MMHG | WEIGHT: 218.8 LBS | TEMPERATURE: 97.4 F | OXYGEN SATURATION: 95 % | BODY MASS INDEX: 32.41 KG/M2 | HEART RATE: 83 BPM | DIASTOLIC BLOOD PRESSURE: 89 MMHG | RESPIRATION RATE: 16 BRPM | HEIGHT: 69 IN

## 2024-01-23 DIAGNOSIS — Z95.3 HISTORY OF AORTIC VALVE REPLACEMENT WITH BIOPROSTHETIC VALVE: ICD-10-CM

## 2024-01-23 DIAGNOSIS — R07.9 CHEST PAIN, UNSPECIFIED TYPE: Primary | ICD-10-CM

## 2024-01-23 DIAGNOSIS — Z79.01 ANTICOAGULATED BY ANTICOAGULATION TREATMENT: ICD-10-CM

## 2024-01-23 DIAGNOSIS — Z86.79 HISTORY OF ATRIAL FIBRILLATION: ICD-10-CM

## 2024-01-23 LAB
ALBUMIN SERPL-MCNC: 4.6 G/DL (ref 3.4–5)
ALBUMIN/GLOB SERPL: 1.8 {RATIO} (ref 1.1–2.2)
ALP SERPL-CCNC: 67 U/L (ref 40–129)
ALT SERPL-CCNC: 24 U/L (ref 10–40)
ANION GAP SERPL CALCULATED.3IONS-SCNC: 11 MMOL/L (ref 3–16)
AST SERPL-CCNC: 22 U/L (ref 15–37)
BASOPHILS # BLD: 0.1 K/UL (ref 0–0.2)
BASOPHILS NFR BLD: 1.9 %
BILIRUB SERPL-MCNC: 0.7 MG/DL (ref 0–1)
BUN SERPL-MCNC: 19 MG/DL (ref 7–20)
CALCIUM SERPL-MCNC: 10.5 MG/DL (ref 8.3–10.6)
CHLORIDE SERPL-SCNC: 104 MMOL/L (ref 99–110)
CO2 SERPL-SCNC: 28 MMOL/L (ref 21–32)
CREAT SERPL-MCNC: 1 MG/DL (ref 0.8–1.3)
DEPRECATED RDW RBC AUTO: 13.5 % (ref 12.4–15.4)
EKG ATRIAL RATE: 88 BPM
EKG DIAGNOSIS: NORMAL
EKG P AXIS: 49 DEGREES
EKG P-R INTERVAL: 192 MS
EKG Q-T INTERVAL: 358 MS
EKG QRS DURATION: 84 MS
EKG QTC CALCULATION (BAZETT): 433 MS
EKG R AXIS: -8 DEGREES
EKG T AXIS: 17 DEGREES
EKG VENTRICULAR RATE: 88 BPM
EOSINOPHIL # BLD: 0.2 K/UL (ref 0–0.6)
EOSINOPHIL NFR BLD: 3.6 %
GFR SERPLBLD CREATININE-BSD FMLA CKD-EPI: >60 ML/MIN/{1.73_M2}
GLUCOSE SERPL-MCNC: 82 MG/DL (ref 70–99)
HCT VFR BLD AUTO: 46.8 % (ref 40.5–52.5)
HGB BLD-MCNC: 16 G/DL (ref 13.5–17.5)
LIPASE SERPL-CCNC: 58 U/L (ref 13–60)
LYMPHOCYTES # BLD: 1.4 K/UL (ref 1–5.1)
LYMPHOCYTES NFR BLD: 30.3 %
MCH RBC QN AUTO: 29.8 PG (ref 26–34)
MCHC RBC AUTO-ENTMCNC: 34.1 G/DL (ref 31–36)
MCV RBC AUTO: 87.5 FL (ref 80–100)
MONOCYTES # BLD: 0.4 K/UL (ref 0–1.3)
MONOCYTES NFR BLD: 8 %
NEUTROPHILS # BLD: 2.7 K/UL (ref 1.7–7.7)
NEUTROPHILS NFR BLD: 56.2 %
PLATELET # BLD AUTO: 144 K/UL (ref 135–450)
PMV BLD AUTO: 7.9 FL (ref 5–10.5)
POTASSIUM SERPL-SCNC: 4.3 MMOL/L (ref 3.5–5.1)
PROT SERPL-MCNC: 7.1 G/DL (ref 6.4–8.2)
RBC # BLD AUTO: 5.35 M/UL (ref 4.2–5.9)
SODIUM SERPL-SCNC: 143 MMOL/L (ref 136–145)
TROPONIN, HIGH SENSITIVITY: 16 NG/L (ref 0–22)
TROPONIN, HIGH SENSITIVITY: 19 NG/L (ref 0–22)
WBC # BLD AUTO: 4.8 K/UL (ref 4–11)

## 2024-01-23 PROCEDURE — 71045 X-RAY EXAM CHEST 1 VIEW: CPT

## 2024-01-23 PROCEDURE — 36415 COLL VENOUS BLD VENIPUNCTURE: CPT

## 2024-01-23 PROCEDURE — 93010 ELECTROCARDIOGRAM REPORT: CPT | Performed by: INTERNAL MEDICINE

## 2024-01-23 PROCEDURE — 84484 ASSAY OF TROPONIN QUANT: CPT

## 2024-01-23 PROCEDURE — 93005 ELECTROCARDIOGRAM TRACING: CPT | Performed by: EMERGENCY MEDICINE

## 2024-01-23 PROCEDURE — 83690 ASSAY OF LIPASE: CPT

## 2024-01-23 PROCEDURE — 6370000000 HC RX 637 (ALT 250 FOR IP): Performed by: EMERGENCY MEDICINE

## 2024-01-23 PROCEDURE — 80053 COMPREHEN METABOLIC PANEL: CPT

## 2024-01-23 PROCEDURE — 99285 EMERGENCY DEPT VISIT HI MDM: CPT

## 2024-01-23 PROCEDURE — 85025 COMPLETE CBC W/AUTO DIFF WBC: CPT

## 2024-01-23 RX ORDER — ASPIRIN 81 MG/1
324 TABLET, CHEWABLE ORAL ONCE
Status: COMPLETED | OUTPATIENT
Start: 2024-01-23 | End: 2024-01-23

## 2024-01-23 RX ADMIN — ASPIRIN 324 MG: 81 TABLET, CHEWABLE ORAL at 17:38

## 2024-01-23 ASSESSMENT — ENCOUNTER SYMPTOMS
COUGH: 0
ABDOMINAL PAIN: 0
CHEST TIGHTNESS: 1
SHORTNESS OF BREATH: 1

## 2024-01-23 ASSESSMENT — PAIN DESCRIPTION - LOCATION: LOCATION: CHEST;JAW

## 2024-01-23 ASSESSMENT — LIFESTYLE VARIABLES
HOW MANY STANDARD DRINKS CONTAINING ALCOHOL DO YOU HAVE ON A TYPICAL DAY: PATIENT DOES NOT DRINK
HOW OFTEN DO YOU HAVE A DRINK CONTAINING ALCOHOL: NEVER

## 2024-01-23 ASSESSMENT — PAIN SCALES - GENERAL: PAINLEVEL_OUTOF10: 1

## 2024-01-23 ASSESSMENT — PAIN - FUNCTIONAL ASSESSMENT: PAIN_FUNCTIONAL_ASSESSMENT: 0-10

## 2024-01-23 ASSESSMENT — PAIN DESCRIPTION - DESCRIPTORS: DESCRIPTORS: PRESSURE

## 2024-01-23 ASSESSMENT — HEART SCORE: ECG: 1

## 2024-01-23 NOTE — ED PROVIDER NOTES
[facility] EMERGENCY DEPARTMENT  EMERGENCY DEPARTMENT ENCOUNTER      Pt Name: Bakari Moss Jr.  MRN: [id number]  Birthdate 1960  Date of evaluation: 1/23/2024  Provider: FIDEL ROSS MD    CHIEF COMPLAINT       Chief Complaint   Patient presents with    Chest Pain     Pt states he was out in the shop working and states that when he bent over he became lightheaded and states that once he got into the house, pt states that he had chest pain and jaw pain. Pt states that he took 3 Nitro's and came here. Pt states pain 1/10 at the moment.         HISTORY OF PRESENT ILLNESS   (Location/Symptom, Timing/Onset, Context/Setting, Quality, Duration, Modifying Factors, Severity)  Note limiting factors.     Bakari Moss Jr. is a 63 y.o. male who presents to the emergency department     Patient presents emergency department having some heaviness in his chest.  Apparently in 2014 or 15 he did have coronary stents placed.  He has 1 in his LAD as well as 1 in his right coronary artery.  Apparently after that he did have a bioprosthetic aortic valve placed.  He is also being followed for a aortic aneurysm by Dr. Olson    He also is followed by Dr. Josh gaona for his paroxysmal atrial fibs he says that he goes in and out.  But he is never had chest discomfort.  He is on Eliquis and Plavix but not aspirin his last he did have a chest MRI which revealed that his aneurysm was only 4.4 cm and apparently was stable Dr. Olson felt that it could be rechecked in 1 year    Apparently this afternoon while lifting a container of fuel he developed some heaviness in his chest he also felt maybe his heart rate was going irregular he does have a loop recorder but it is not working and actually was waiting to get it taken out  He never had pain like this ever before  He went in the house and took a subsequently 3 nitroglycerin 5 minutes apart each 1 gave him relief till after the3rd as totally pain-free he presents here.  He  Hemoglobin 16.0 13.5 - 17.5 g/dL    Hematocrit 46.8 40.5 - 52.5 %    MCV 87.5 80.0 - 100.0 fL    MCH 29.8 26.0 - 34.0 pg    MCHC 34.1 31.0 - 36.0 g/dL    RDW 13.5 12.4 - 15.4 %    Platelets 144 135 - 450 K/uL    MPV 7.9 5.0 - 10.5 fL    Neutrophils % 56.2 %    Lymphocytes % 30.3 %    Monocytes % 8.0 %    Eosinophils % 3.6 %    Basophils % 1.9 %    Neutrophils Absolute 2.7 1.7 - 7.7 K/uL    Lymphocytes Absolute 1.4 1.0 - 5.1 K/uL    Monocytes Absolute 0.4 0.0 - 1.3 K/uL    Eosinophils Absolute 0.2 0.0 - 0.6 K/uL    Basophils Absolute 0.1 0.0 - 0.2 K/uL   Comprehensive Metabolic Panel w/ Reflex to MG   Result Value Ref Range    Sodium 143 136 - 145 mmol/L    Potassium reflex Magnesium 4.3 3.5 - 5.1 mmol/L    Chloride 104 99 - 110 mmol/L    CO2 28 21 - 32 mmol/L    Anion Gap 11 3 - 16    Glucose 82 70 - 99 mg/dL    BUN 19 7 - 20 mg/dL    Creatinine 1.0 0.8 - 1.3 mg/dL    Est, Glom Filt Rate >60 >60    Calcium 10.5 8.3 - 10.6 mg/dL    Total Protein 7.1 6.4 - 8.2 g/dL    Albumin 4.6 3.4 - 5.0 g/dL    Albumin/Globulin Ratio 1.8 1.1 - 2.2    Total Bilirubin 0.7 0.0 - 1.0 mg/dL    Alkaline Phosphatase 67 40 - 129 U/L    ALT 24 10 - 40 U/L    AST 22 15 - 37 U/L   Lipase   Result Value Ref Range    Lipase 58.0 13.0 - 60.0 U/L   EKG 12 Lead   Result Value Ref Range    Ventricular Rate 88 BPM    Atrial Rate 88 BPM    P-R Interval 192 ms    QRS Duration 84 ms    Q-T Interval 358 ms    QTc Calculation (Bazett) 433 ms    P Axis 49 degrees    R Axis -8 degrees    T Axis 17 degrees    Diagnosis       Normal sinus rhythmMinimal voltage criteria for LVH, may be normal variantInferior infarct , age undeterminedinferior ST abnormality consider ischemiaWhen compared with ECG of 02-NOV-2015 17:29,Inferior infarct is now PresentT wave inversion now evident in Inferior leadsConfirmed by LAURE WILKINSON MD ([id number]) on 1/23/2024 5:46:19 PM              EMERGENCY DEPARTMENT COURSE and DIFFERENTIAL DIAGNOSIS/MDM:     Vitals:    01/23/24 171

## 2024-01-23 NOTE — DISCHARGE INSTRUCTIONS
Follow-up with your primary doctor and cardiologist as soon as able for reevaluation call them in the morning to set up follow-up appointment.  Return to emergency department for any repeat episode of chest pain, shortness of breath, lightheadedness dizziness, blurred vision, focal deficits, motor or sensory changes or any new changing or worsening symptoms.  We are always here for evaluation never hesitate to return if you change your mind about further evaluation and treatment, admission

## 2024-03-05 RX ORDER — PANTOPRAZOLE SODIUM 40 MG/1
40 TABLET, DELAYED RELEASE ORAL DAILY
Qty: 90 TABLET | Refills: 0 | Status: SHIPPED | OUTPATIENT
Start: 2024-03-05

## 2024-03-05 RX ORDER — MELOXICAM 15 MG/1
15 TABLET ORAL DAILY
Qty: 30 TABLET | Refills: 2 | Status: SHIPPED | OUTPATIENT
Start: 2024-03-05

## 2024-03-30 ASSESSMENT — PATIENT HEALTH QUESTIONNAIRE - PHQ9
2. FEELING DOWN, DEPRESSED OR HOPELESS: NOT AT ALL
1. LITTLE INTEREST OR PLEASURE IN DOING THINGS: NOT AT ALL
1. LITTLE INTEREST OR PLEASURE IN DOING THINGS: NOT AT ALL
SUM OF ALL RESPONSES TO PHQ9 QUESTIONS 1 & 2: 0
SUM OF ALL RESPONSES TO PHQ QUESTIONS 1-9: 0
SUM OF ALL RESPONSES TO PHQ QUESTIONS 1-9: 0
SUM OF ALL RESPONSES TO PHQ9 QUESTIONS 1 & 2: 0
SUM OF ALL RESPONSES TO PHQ QUESTIONS 1-9: 0
SUM OF ALL RESPONSES TO PHQ QUESTIONS 1-9: 0
2. FEELING DOWN, DEPRESSED OR HOPELESS: NOT AT ALL

## 2024-04-02 ENCOUNTER — TELEPHONE (OUTPATIENT)
Dept: FAMILY MEDICINE CLINIC | Age: 64
End: 2024-04-02

## 2024-04-02 ENCOUNTER — OFFICE VISIT (OUTPATIENT)
Dept: FAMILY MEDICINE CLINIC | Age: 64
End: 2024-04-02
Payer: COMMERCIAL

## 2024-04-02 VITALS
WEIGHT: 211 LBS | HEART RATE: 63 BPM | BODY MASS INDEX: 31.16 KG/M2 | DIASTOLIC BLOOD PRESSURE: 89 MMHG | OXYGEN SATURATION: 96 % | SYSTOLIC BLOOD PRESSURE: 130 MMHG

## 2024-04-02 DIAGNOSIS — M54.17 LUMBOSACRAL RADICULOPATHY AT L5: ICD-10-CM

## 2024-04-02 DIAGNOSIS — E78.00 PURE HYPERCHOLESTEROLEMIA: ICD-10-CM

## 2024-04-02 DIAGNOSIS — Z13.29 SCREENING FOR THYROID DISORDER: ICD-10-CM

## 2024-04-02 DIAGNOSIS — I25.83 CORONARY ARTERY DISEASE DUE TO LIPID RICH PLAQUE: ICD-10-CM

## 2024-04-02 DIAGNOSIS — R53.82 CHRONIC FATIGUE: ICD-10-CM

## 2024-04-02 DIAGNOSIS — I25.10 CORONARY ARTERY DISEASE DUE TO LIPID RICH PLAQUE: ICD-10-CM

## 2024-04-02 DIAGNOSIS — I48.0 PAROXYSMAL ATRIAL FIBRILLATION (HCC): ICD-10-CM

## 2024-04-02 DIAGNOSIS — R06.83 SNORING: ICD-10-CM

## 2024-04-02 DIAGNOSIS — Z12.11 SCREEN FOR COLON CANCER: Primary | ICD-10-CM

## 2024-04-02 DIAGNOSIS — Z12.5 SCREENING FOR PROSTATE CANCER: ICD-10-CM

## 2024-04-02 DIAGNOSIS — R06.81 WITNESSED EPISODE OF APNEA: ICD-10-CM

## 2024-04-02 DIAGNOSIS — I10 BENIGN ESSENTIAL HTN: Primary | ICD-10-CM

## 2024-04-02 PROCEDURE — 1123F ACP DISCUSS/DSCN MKR DOCD: CPT | Performed by: FAMILY MEDICINE

## 2024-04-02 PROCEDURE — 1036F TOBACCO NON-USER: CPT | Performed by: FAMILY MEDICINE

## 2024-04-02 PROCEDURE — G8427 DOCREV CUR MEDS BY ELIG CLIN: HCPCS | Performed by: FAMILY MEDICINE

## 2024-04-02 PROCEDURE — G8417 CALC BMI ABV UP PARAM F/U: HCPCS | Performed by: FAMILY MEDICINE

## 2024-04-02 PROCEDURE — 3075F SYST BP GE 130 - 139MM HG: CPT | Performed by: FAMILY MEDICINE

## 2024-04-02 PROCEDURE — G2211 COMPLEX E/M VISIT ADD ON: HCPCS | Performed by: FAMILY MEDICINE

## 2024-04-02 PROCEDURE — 3017F COLORECTAL CA SCREEN DOC REV: CPT | Performed by: FAMILY MEDICINE

## 2024-04-02 PROCEDURE — 3079F DIAST BP 80-89 MM HG: CPT | Performed by: FAMILY MEDICINE

## 2024-04-02 PROCEDURE — 99214 OFFICE O/P EST MOD 30 MIN: CPT | Performed by: FAMILY MEDICINE

## 2024-04-02 SDOH — ECONOMIC STABILITY: FOOD INSECURITY: WITHIN THE PAST 12 MONTHS, THE FOOD YOU BOUGHT JUST DIDN'T LAST AND YOU DIDN'T HAVE MONEY TO GET MORE.: NEVER TRUE

## 2024-04-02 SDOH — ECONOMIC STABILITY: FOOD INSECURITY: WITHIN THE PAST 12 MONTHS, YOU WORRIED THAT YOUR FOOD WOULD RUN OUT BEFORE YOU GOT MONEY TO BUY MORE.: NEVER TRUE

## 2024-04-02 SDOH — ECONOMIC STABILITY: INCOME INSECURITY: HOW HARD IS IT FOR YOU TO PAY FOR THE VERY BASICS LIKE FOOD, HOUSING, MEDICAL CARE, AND HEATING?: NOT HARD AT ALL

## 2024-04-02 NOTE — TELEPHONE ENCOUNTER
Patient called and informed of need for colon cancer screening patient agreeable to cologuard. Order placed.

## 2024-04-02 NOTE — PROGRESS NOTES
Chief Complaint   Patient presents with    Annual Exam        Internal Administration   First Dose COVID-19, YAN cortes, Primary or Immunocompromised, (age 12y+), IM, 100 mcg/0.5mL  2021   Second Dose COVID-19, YAN cortes, Primary or Immunocompromised, (age 12y+), IM, 100 mcg/0.5mL   04/15/2021       Last COVID Lab No results found for: \"SARS-COV-2\", \"SARS-COV-2 RNA\", \"SARS-COV-2 RNA, RT PCR\", \"SARS-COV-2, CARLOS\", \"SARS-COV-2, NAAT\", \"SARS-COV-2 BY PCR\", \"POC\", \"SARS-COV-2, POC\", \"RAPID\", \"SARS-COV-2, RAPID\", \"SALIVA\", \"SARS-COV-2, SALIVA\"          Wt Readings from Last 3 Encounters:   24 95.7 kg (211 lb)   24 99.2 kg (218 lb 12.8 oz)   23 94.3 kg (208 lb)     BP Readings from Last 3 Encounters:   24 130/89   24 116/89   23 128/80      No results found for: \"LABA1C\"    HPI:  Bakari Moss Jr. is a 64 y.o. (: 1960) here today for    Patient states that he is doing well, but he has been having issues with his pressures bottoming out and he \"crashes\" he states that he has been watching his blood pressure and if its below 120/60 he will not take his blood pressure medication. He states that her went to the ER the last time thinking he was having a heart attack but it was due to low blood pressure.     Patient states that since he was diagnosed with flu A a month ago and he states that ever since he has felt like his memory is not right and his overall thinking even with driving. Just feels like he is not as \"sharp\" he denies any other neurologic deficits- no one sided weakness, no trouble with swallowing. Informed this is no common with the flu but its possible.  was concerned about possible early alzheimer's. He states that he can't recall names of seeds which he used to know all the names of and wife states its an obvious change in his driving ability. Discussed that sometimes this can be the result of just old age. He states that its not

## 2024-04-02 NOTE — TELEPHONE ENCOUNTER
----- Message from Moustapha Greer MD sent at 4/2/2024  1:15 PM EDT -----  We should have mentioned to the patient colon cancer screening, please call to recommend Cologuard versus colonoscopy

## 2024-04-24 LAB — NONINV COLON CA DNA+OCC BLD SCRN STL QL: NEGATIVE

## 2024-05-14 ENCOUNTER — NURSE ONLY (OUTPATIENT)
Dept: FAMILY MEDICINE CLINIC | Age: 64
End: 2024-05-14
Payer: COMMERCIAL

## 2024-05-14 DIAGNOSIS — I25.83 CORONARY ARTERY DISEASE DUE TO LIPID RICH PLAQUE: ICD-10-CM

## 2024-05-14 DIAGNOSIS — Z13.29 SCREENING FOR THYROID DISORDER: ICD-10-CM

## 2024-05-14 DIAGNOSIS — Z12.5 SCREENING FOR PROSTATE CANCER: ICD-10-CM

## 2024-05-14 DIAGNOSIS — R53.83 OTHER FATIGUE: Primary | ICD-10-CM

## 2024-05-14 DIAGNOSIS — E78.00 PURE HYPERCHOLESTEROLEMIA: ICD-10-CM

## 2024-05-14 DIAGNOSIS — I25.10 CORONARY ARTERY DISEASE DUE TO LIPID RICH PLAQUE: ICD-10-CM

## 2024-05-14 DIAGNOSIS — I10 BENIGN ESSENTIAL HTN: ICD-10-CM

## 2024-05-14 LAB
ALBUMIN SERPL-MCNC: 4.2 G/DL (ref 3.4–5)
ALBUMIN/GLOB SERPL: 1.9 {RATIO} (ref 1.1–2.2)
ALP SERPL-CCNC: 68 U/L (ref 40–129)
ALT SERPL-CCNC: 22 U/L (ref 10–40)
ANION GAP SERPL CALCULATED.3IONS-SCNC: 13 MMOL/L (ref 3–16)
AST SERPL-CCNC: 17 U/L (ref 15–37)
BASOPHILS # BLD: 0.1 K/UL (ref 0–0.2)
BASOPHILS NFR BLD: 1.2 %
BILIRUB SERPL-MCNC: 0.9 MG/DL (ref 0–1)
BUN SERPL-MCNC: 17 MG/DL (ref 7–20)
CALCIUM SERPL-MCNC: 9.5 MG/DL (ref 8.3–10.6)
CHLORIDE SERPL-SCNC: 106 MMOL/L (ref 99–110)
CHOLEST SERPL-MCNC: 148 MG/DL (ref 0–199)
CO2 SERPL-SCNC: 23 MMOL/L (ref 21–32)
CREAT SERPL-MCNC: 0.8 MG/DL (ref 0.8–1.3)
DEPRECATED RDW RBC AUTO: 14.2 % (ref 12.4–15.4)
EOSINOPHIL # BLD: 0.3 K/UL (ref 0–0.6)
EOSINOPHIL NFR BLD: 5.2 %
GFR SERPLBLD CREATININE-BSD FMLA CKD-EPI: >90 ML/MIN/{1.73_M2}
GLUCOSE SERPL-MCNC: 96 MG/DL (ref 70–99)
HCT VFR BLD AUTO: 47.7 % (ref 40.5–52.5)
HDLC SERPL-MCNC: 39 MG/DL (ref 40–60)
HGB BLD-MCNC: 16.1 G/DL (ref 13.5–17.5)
LDLC SERPL CALC-MCNC: 74 MG/DL
LYMPHOCYTES # BLD: 1.4 K/UL (ref 1–5.1)
LYMPHOCYTES NFR BLD: 27.6 %
MCH RBC QN AUTO: 30.3 PG (ref 26–34)
MCHC RBC AUTO-ENTMCNC: 33.7 G/DL (ref 31–36)
MCV RBC AUTO: 89.9 FL (ref 80–100)
MONOCYTES # BLD: 0.5 K/UL (ref 0–1.3)
MONOCYTES NFR BLD: 9 %
NEUTROPHILS # BLD: 2.9 K/UL (ref 1.7–7.7)
NEUTROPHILS NFR BLD: 57 %
PLATELET # BLD AUTO: 166 K/UL (ref 135–450)
PMV BLD AUTO: 8.6 FL (ref 5–10.5)
POTASSIUM SERPL-SCNC: 4.3 MMOL/L (ref 3.5–5.1)
PROT SERPL-MCNC: 6.4 G/DL (ref 6.4–8.2)
PSA SERPL DL<=0.01 NG/ML-MCNC: 2.74 NG/ML (ref 0–4)
RBC # BLD AUTO: 5.31 M/UL (ref 4.2–5.9)
SODIUM SERPL-SCNC: 142 MMOL/L (ref 136–145)
TRIGL SERPL-MCNC: 176 MG/DL (ref 0–150)
TSH SERPL DL<=0.005 MIU/L-ACNC: 2.53 UIU/ML (ref 0.27–4.2)
VLDLC SERPL CALC-MCNC: 35 MG/DL
WBC # BLD AUTO: 5 K/UL (ref 4–11)

## 2024-05-14 PROCEDURE — 36415 COLL VENOUS BLD VENIPUNCTURE: CPT | Performed by: FAMILY MEDICINE

## 2024-05-16 LAB — TESTOST SERPL-MCNC: 391 NG/DL (ref 193–740)

## 2024-05-24 RX ORDER — PANTOPRAZOLE SODIUM 40 MG/1
40 TABLET, DELAYED RELEASE ORAL DAILY
Qty: 90 TABLET | Refills: 0 | Status: SHIPPED | OUTPATIENT
Start: 2024-05-24

## 2024-05-24 NOTE — TELEPHONE ENCOUNTER
Future appt scheduled 0 appt scheduled                Last appt 04/02/2024      Last Written 03/05/2024    pantoprazole (PROTONIX) 40 MG tablet   #90  0 RF

## 2024-06-24 RX ORDER — MELOXICAM 15 MG/1
15 TABLET ORAL DAILY
Qty: 30 TABLET | Refills: 2 | Status: SHIPPED | OUTPATIENT
Start: 2024-06-24

## 2024-07-11 ENCOUNTER — OFFICE VISIT (OUTPATIENT)
Dept: ENT CLINIC | Age: 64
End: 2024-07-11
Payer: COMMERCIAL

## 2024-07-11 VITALS
DIASTOLIC BLOOD PRESSURE: 97 MMHG | BODY MASS INDEX: 31.25 KG/M2 | HEIGHT: 69 IN | HEART RATE: 74 BPM | WEIGHT: 211 LBS | SYSTOLIC BLOOD PRESSURE: 133 MMHG

## 2024-07-11 DIAGNOSIS — J34.89 NASAL OBSTRUCTION: ICD-10-CM

## 2024-07-11 DIAGNOSIS — J34.89 NASAL MASS: Primary | ICD-10-CM

## 2024-07-11 PROCEDURE — 3017F COLORECTAL CA SCREEN DOC REV: CPT | Performed by: OTOLARYNGOLOGY

## 2024-07-11 PROCEDURE — 3075F SYST BP GE 130 - 139MM HG: CPT | Performed by: OTOLARYNGOLOGY

## 2024-07-11 PROCEDURE — 31231 NASAL ENDOSCOPY DX: CPT | Performed by: OTOLARYNGOLOGY

## 2024-07-11 PROCEDURE — 1123F ACP DISCUSS/DSCN MKR DOCD: CPT | Performed by: OTOLARYNGOLOGY

## 2024-07-11 PROCEDURE — 1036F TOBACCO NON-USER: CPT | Performed by: OTOLARYNGOLOGY

## 2024-07-11 PROCEDURE — G8417 CALC BMI ABV UP PARAM F/U: HCPCS | Performed by: OTOLARYNGOLOGY

## 2024-07-11 PROCEDURE — 99203 OFFICE O/P NEW LOW 30 MIN: CPT | Performed by: OTOLARYNGOLOGY

## 2024-07-11 PROCEDURE — 3080F DIAST BP >= 90 MM HG: CPT | Performed by: OTOLARYNGOLOGY

## 2024-07-11 PROCEDURE — G8427 DOCREV CUR MEDS BY ELIG CLIN: HCPCS | Performed by: OTOLARYNGOLOGY

## 2024-07-11 RX ORDER — LORATADINE 10 MG/1
10 CAPSULE, LIQUID FILLED ORAL DAILY
COMMUNITY

## 2024-07-11 ASSESSMENT — ENCOUNTER SYMPTOMS
APNEA: 0
COUGH: 0
TROUBLE SWALLOWING: 0
SHORTNESS OF BREATH: 0
FACIAL SWELLING: 0
SINUS PRESSURE: 0
SORE THROAT: 0
VOICE CHANGE: 0
EYE ITCHING: 0

## 2024-07-11 NOTE — PROGRESS NOTES
[facility]  [address]  [place] [address]  P: [phone]       Patient     Bakari Moss .  1960    ChiefComplaint     Chief Complaint   Patient presents with    New Patient     Left side nose stenosis        History of Present Illness     Bakari is a 64-year-old male here today with his wife for evaluation of left-sided nasal obstruction.  Onset 2 months ago with inability to move air through the left side of the nose.  No preceding URI nasal drainage or epistaxis.  Symptoms have improved some in the last several weeks.  No history of nasal or sinus surgery.  Uses oral antihistamine without benefit.    Past Medical History     Past Medical History:   Diagnosis Date    CAD (coronary artery disease)     Hypertension     Other disorders of kidney and ureter     stones    Pericarditis        Past Surgical History     Past Surgical History:   Procedure Laterality Date    CHOLECYSTECTOMY      CORONARY ANGIOPLASTY WITH STENT PLACEMENT         Family History     Family History   Problem Relation Age of Onset    Diabetes Mother     High Blood Pressure Mother        Social History     Social History     Tobacco Use    Smoking status: Never    Smokeless tobacco: Never   Substance Use Topics    Alcohol use: No     Alcohol/week: 0.0 standard drinks of alcohol    Drug use: No        Allergies     Allergies   Allergen Reactions    Miralax [Polyethylene Glycol] Other (See Comments)     Seizure after consuming whole bottle for colonoscopy per patient    Quinolones Other (See Comments)     Thoracic ascending aortic aneurysm    Codeine Nausea And Vomiting       Medications     Current Outpatient Medications   Medication Sig Dispense Refill    loratadine (CLARITIN) 10 MG capsule Take 1 capsule by mouth daily      meloxicam (MOBIC) 15 MG tablet TAKE 1 TABLET BY MOUTH DAILY 30 tablet 2    pantoprazole (PROTONIX) 40 MG tablet TAKE 1 TABLET BY MOUTH DAILY 90 tablet 0    rosuvastatin

## 2024-08-06 ENCOUNTER — OFFICE VISIT (OUTPATIENT)
Dept: ENT CLINIC | Age: 64
End: 2024-08-06
Payer: COMMERCIAL

## 2024-08-06 ENCOUNTER — TELEPHONE (OUTPATIENT)
Dept: FAMILY MEDICINE CLINIC | Age: 64
End: 2024-08-06

## 2024-08-06 VITALS
HEIGHT: 69 IN | RESPIRATION RATE: 16 BRPM | HEART RATE: 64 BPM | SYSTOLIC BLOOD PRESSURE: 123 MMHG | DIASTOLIC BLOOD PRESSURE: 82 MMHG | BODY MASS INDEX: 31.16 KG/M2

## 2024-08-06 DIAGNOSIS — J34.89 NASAL OBSTRUCTION: ICD-10-CM

## 2024-08-06 DIAGNOSIS — J32.4 CHRONIC PANSINUSITIS: Primary | ICD-10-CM

## 2024-08-06 DIAGNOSIS — J33.9 NASAL POLYP: ICD-10-CM

## 2024-08-06 PROCEDURE — 1123F ACP DISCUSS/DSCN MKR DOCD: CPT | Performed by: OTOLARYNGOLOGY

## 2024-08-06 PROCEDURE — 3017F COLORECTAL CA SCREEN DOC REV: CPT | Performed by: OTOLARYNGOLOGY

## 2024-08-06 PROCEDURE — 3074F SYST BP LT 130 MM HG: CPT | Performed by: OTOLARYNGOLOGY

## 2024-08-06 PROCEDURE — 3079F DIAST BP 80-89 MM HG: CPT | Performed by: OTOLARYNGOLOGY

## 2024-08-06 PROCEDURE — 1036F TOBACCO NON-USER: CPT | Performed by: OTOLARYNGOLOGY

## 2024-08-06 PROCEDURE — G8427 DOCREV CUR MEDS BY ELIG CLIN: HCPCS | Performed by: OTOLARYNGOLOGY

## 2024-08-06 PROCEDURE — G8417 CALC BMI ABV UP PARAM F/U: HCPCS | Performed by: OTOLARYNGOLOGY

## 2024-08-06 PROCEDURE — 99214 OFFICE O/P EST MOD 30 MIN: CPT | Performed by: OTOLARYNGOLOGY

## 2024-08-06 RX ORDER — PANTOPRAZOLE SODIUM 40 MG/1
40 TABLET, DELAYED RELEASE ORAL DAILY
Qty: 90 TABLET | Refills: 0 | Status: SHIPPED | OUTPATIENT
Start: 2024-08-06

## 2024-08-06 ASSESSMENT — ENCOUNTER SYMPTOMS
APNEA: 0
SORE THROAT: 0
COUGH: 0
VOICE CHANGE: 0
TROUBLE SWALLOWING: 0
SHORTNESS OF BREATH: 0
FACIAL SWELLING: 0
EYE ITCHING: 0
SINUS PRESSURE: 0

## 2024-08-06 NOTE — PROGRESS NOTES
Assessment and Plan     1. Chronic pansinusitis  -CT images reviewed in detail with patient and his wife demonstrate chronic pansinusitis, left-sided nasal obstruction secondary to nasal polyp versus mass  -Has used nasal steroid and oral antihistamine without benefit  2. Nasal polyp  -Unilateral nasal growth most likely nasal polyp given underlying chronic sinusitis but cannot exclude alternative nasal mass given unilateral nature.  -Extending from the left middle meatus and sphenoethmoidal recess    Bilateral Fess recommended given extent of chronic sinusitis as well as removal of nasal polyps/mass for definitive pathologic diagnosis.  Risks explained include potential injury to eye, CSF leak, postoperative bleeding.  Questions answered wishes to proceed        Chhaya Bettencourt DO  8/6/24      Portions of this note were dictated using Dragon. There may be linguistic errors secondary to the use of this program.

## 2024-08-08 ENCOUNTER — PREP FOR PROCEDURE (OUTPATIENT)
Dept: ENT CLINIC | Age: 64
End: 2024-08-08

## 2024-08-08 DIAGNOSIS — J33.9 MULTIPLE NASAL POLYPS: ICD-10-CM

## 2024-08-08 DIAGNOSIS — J32.0 CHRONIC MAXILLARY SINUSITIS: ICD-10-CM

## 2024-08-12 ENCOUNTER — OFFICE VISIT (OUTPATIENT)
Dept: FAMILY MEDICINE CLINIC | Age: 64
End: 2024-08-12
Payer: COMMERCIAL

## 2024-08-12 VITALS
DIASTOLIC BLOOD PRESSURE: 92 MMHG | BODY MASS INDEX: 32.29 KG/M2 | OXYGEN SATURATION: 96 % | WEIGHT: 218 LBS | HEART RATE: 65 BPM | TEMPERATURE: 97.3 F | SYSTOLIC BLOOD PRESSURE: 134 MMHG | HEIGHT: 69 IN

## 2024-08-12 DIAGNOSIS — I48.0 PAROXYSMAL ATRIAL FIBRILLATION (HCC): ICD-10-CM

## 2024-08-12 DIAGNOSIS — Z01.818 PREOPERATIVE EXAMINATION: Primary | ICD-10-CM

## 2024-08-12 DIAGNOSIS — I25.10 CORONARY ARTERY DISEASE DUE TO LIPID RICH PLAQUE: ICD-10-CM

## 2024-08-12 DIAGNOSIS — I10 BENIGN ESSENTIAL HTN: ICD-10-CM

## 2024-08-12 DIAGNOSIS — J32.0 CHRONIC MAXILLARY SINUSITIS: ICD-10-CM

## 2024-08-12 DIAGNOSIS — I25.83 CORONARY ARTERY DISEASE DUE TO LIPID RICH PLAQUE: ICD-10-CM

## 2024-08-12 DIAGNOSIS — Z95.3 H/O HEART VALVE REPLACEMENT WITH BIOPROSTHETIC VALVE: ICD-10-CM

## 2024-08-12 LAB
ALBUMIN SERPL-MCNC: 4.7 G/DL (ref 3.4–5)
ALBUMIN/GLOB SERPL: 1.9 {RATIO} (ref 1.1–2.2)
ALP SERPL-CCNC: 78 U/L (ref 40–129)
ALT SERPL-CCNC: 22 U/L (ref 10–40)
ANION GAP SERPL CALCULATED.3IONS-SCNC: 13 MMOL/L (ref 3–16)
AST SERPL-CCNC: 22 U/L (ref 15–37)
BASOPHILS # BLD: 0 K/UL (ref 0–0.2)
BASOPHILS NFR BLD: 0.8 %
BILIRUB SERPL-MCNC: 0.8 MG/DL (ref 0–1)
BUN SERPL-MCNC: 26 MG/DL (ref 7–20)
CALCIUM SERPL-MCNC: 9.6 MG/DL (ref 8.3–10.6)
CHLORIDE SERPL-SCNC: 102 MMOL/L (ref 99–110)
CO2 SERPL-SCNC: 25 MMOL/L (ref 21–32)
CREAT SERPL-MCNC: 0.9 MG/DL (ref 0.8–1.3)
DEPRECATED RDW RBC AUTO: 13.7 % (ref 12.4–15.4)
EOSINOPHIL # BLD: 0.2 K/UL (ref 0–0.6)
EOSINOPHIL NFR BLD: 4.3 %
GFR SERPLBLD CREATININE-BSD FMLA CKD-EPI: >90 ML/MIN/{1.73_M2}
GLUCOSE SERPL-MCNC: 102 MG/DL (ref 70–99)
HCT VFR BLD AUTO: 49.4 % (ref 40.5–52.5)
HGB BLD-MCNC: 16.9 G/DL (ref 13.5–17.5)
LYMPHOCYTES # BLD: 1.2 K/UL (ref 1–5.1)
LYMPHOCYTES NFR BLD: 26.2 %
MCH RBC QN AUTO: 30.9 PG (ref 26–34)
MCHC RBC AUTO-ENTMCNC: 34.2 G/DL (ref 31–36)
MCV RBC AUTO: 90.3 FL (ref 80–100)
MONOCYTES # BLD: 0.4 K/UL (ref 0–1.3)
MONOCYTES NFR BLD: 8.3 %
NEUTROPHILS # BLD: 2.7 K/UL (ref 1.7–7.7)
NEUTROPHILS NFR BLD: 60.4 %
PLATELET # BLD AUTO: 159 K/UL (ref 135–450)
PMV BLD AUTO: 8.6 FL (ref 5–10.5)
POTASSIUM SERPL-SCNC: 4.2 MMOL/L (ref 3.5–5.1)
PROT SERPL-MCNC: 7.2 G/DL (ref 6.4–8.2)
RBC # BLD AUTO: 5.47 M/UL (ref 4.2–5.9)
SODIUM SERPL-SCNC: 140 MMOL/L (ref 136–145)
WBC # BLD AUTO: 4.4 K/UL (ref 4–11)

## 2024-08-12 PROCEDURE — 36415 COLL VENOUS BLD VENIPUNCTURE: CPT | Performed by: NURSE PRACTITIONER

## 2024-08-12 PROCEDURE — 3080F DIAST BP >= 90 MM HG: CPT | Performed by: NURSE PRACTITIONER

## 2024-08-12 PROCEDURE — 1036F TOBACCO NON-USER: CPT | Performed by: NURSE PRACTITIONER

## 2024-08-12 PROCEDURE — 99214 OFFICE O/P EST MOD 30 MIN: CPT | Performed by: NURSE PRACTITIONER

## 2024-08-12 PROCEDURE — 93000 ELECTROCARDIOGRAM COMPLETE: CPT | Performed by: NURSE PRACTITIONER

## 2024-08-12 PROCEDURE — G8427 DOCREV CUR MEDS BY ELIG CLIN: HCPCS | Performed by: NURSE PRACTITIONER

## 2024-08-12 PROCEDURE — 3075F SYST BP GE 130 - 139MM HG: CPT | Performed by: NURSE PRACTITIONER

## 2024-08-12 PROCEDURE — 3017F COLORECTAL CA SCREEN DOC REV: CPT | Performed by: NURSE PRACTITIONER

## 2024-08-12 PROCEDURE — 1123F ACP DISCUSS/DSCN MKR DOCD: CPT | Performed by: NURSE PRACTITIONER

## 2024-08-12 PROCEDURE — G8417 CALC BMI ABV UP PARAM F/U: HCPCS | Performed by: NURSE PRACTITIONER

## 2024-08-12 NOTE — H&P (VIEW-ONLY)
[facility]                             Etta Aguillon                             Preoperative Evaluation        Bakari Moss Jr.  YOB: 1960    Date of Service:  8/12/2024    Vitals:    08/12/24 0850 08/12/24 0852   BP: (!) 138/100 (!) 134/92   Site: Left Upper Arm Left Upper Arm   Position: Sitting Sitting   Cuff Size: Large Adult Large Adult   Pulse: 65    Temp: 97.3 °F (36.3 °C)    SpO2: 96%    Weight: 98.9 kg (218 lb)    Height: 1.753 m (5' 9\")       Wt Readings from Last 2 Encounters:   08/12/24 98.9 kg (218 lb)   07/11/24 95.7 kg (211 lb)     BP Readings from Last 3 Encounters:   08/12/24 (!) 134/92   08/06/24 123/82   07/11/24 (!) 133/97        Chief Complaint   Patient presents with    Pre-op Exam     Pt is here for pre op for sinus surgery with Dr Bettencourt on 8/26/24 at [facility]     Allergies   Allergen Reactions    Miralax [Polyethylene Glycol] Other (See Comments)     Seizure after consuming whole bottle for colonoscopy per patient    Quinolones Other (See Comments)     Thoracic ascending aortic aneurysm    Codeine Nausea And Vomiting     Outpatient Medications Marked as Taking for the 8/12/24 encounter (Office Visit) with Etta Aguillon APRN - CNP   Medication Sig Dispense Refill    pantoprazole (PROTONIX) 40 MG tablet TAKE 1 TABLET BY MOUTH DAILY 90 tablet 0    meloxicam (MOBIC) 15 MG tablet TAKE 1 TABLET BY MOUTH DAILY 30 tablet 2    rosuvastatin (CRESTOR) 40 MG tablet Take 1 tablet by mouth daily 90 tablet 0    losartan (COZAAR) 25 MG tablet Take 1 tablet by mouth daily      metoprolol tartrate (LOPRESSOR) 25 MG tablet TAKE 1/2 TABLET BY MOUTH TWICE DAILY 90 tablet 3    apixaban (ELIQUIS) 5 MG TABS tablet Take 1 tablet by mouth 2 times daily      clopidogrel (PLAVIX) 75 MG tablet Take 1 tablet by mouth daily         This patient presents to the office today for a preoperative consultation at the request of surgeon,

## 2024-08-12 NOTE — PROGRESS NOTES
[facility]                             Etta Aguillon                             Preoperative Evaluation        Bakari Moss Jr.  YOB: 1960    Date of Service:  8/12/2024    Vitals:    08/12/24 0850 08/12/24 0852   BP: (!) 138/100 (!) 134/92   Site: Left Upper Arm Left Upper Arm   Position: Sitting Sitting   Cuff Size: Large Adult Large Adult   Pulse: 65    Temp: 97.3 °F (36.3 °C)    SpO2: 96%    Weight: 98.9 kg (218 lb)    Height: 1.753 m (5' 9\")       Wt Readings from Last 2 Encounters:   08/12/24 98.9 kg (218 lb)   07/11/24 95.7 kg (211 lb)     BP Readings from Last 3 Encounters:   08/12/24 (!) 134/92   08/06/24 123/82   07/11/24 (!) 133/97        Chief Complaint   Patient presents with    Pre-op Exam     Pt is here for pre op for sinus surgery with Dr Bettencourt on 8/26/24 at [facility]     Allergies   Allergen Reactions    Miralax [Polyethylene Glycol] Other (See Comments)     Seizure after consuming whole bottle for colonoscopy per patient    Quinolones Other (See Comments)     Thoracic ascending aortic aneurysm    Codeine Nausea And Vomiting     Outpatient Medications Marked as Taking for the 8/12/24 encounter (Office Visit) with Etta Aguillon APRN - CNP   Medication Sig Dispense Refill    pantoprazole (PROTONIX) 40 MG tablet TAKE 1 TABLET BY MOUTH DAILY 90 tablet 0    meloxicam (MOBIC) 15 MG tablet TAKE 1 TABLET BY MOUTH DAILY 30 tablet 2    rosuvastatin (CRESTOR) 40 MG tablet Take 1 tablet by mouth daily 90 tablet 0    losartan (COZAAR) 25 MG tablet Take 1 tablet by mouth daily      metoprolol tartrate (LOPRESSOR) 25 MG tablet TAKE 1/2 TABLET BY MOUTH TWICE DAILY 90 tablet 3    apixaban (ELIQUIS) 5 MG TABS tablet Take 1 tablet by mouth 2 times daily      clopidogrel (PLAVIX) 75 MG tablet Take 1 tablet by mouth daily         This patient presents to the office today for a preoperative consultation at the request of surgeon,

## 2024-08-12 NOTE — PATIENT INSTRUCTIONS
Not feeling your best?  Where to go for the right care at the right time.    Dear Bakari Moss Jr.   I wanted to provide you with some information that might help you seek care for your condition when your primary care provider or specialist is unavailable. If you have a need outside of normal business hours, you should first contact your primary care office or specialist caring for your condition. They may have on-call providers that could assist with your care. During office hours, you may request a virtual or same day appointment.   But what if your primary care provider is not in the office that day and you can't wait until the  next day for care? In that situation, your next option is to visit an urgent care facility.          [facility] now has urgent care sites open to support our community.   [facility] is a great alternative when you need immediate medical care that is not a serious threat to your health or your doctor's office is closed or unable to get you in for an appointment. The urgent care centers offer fast access to [facility] doctors for minor illnesses and injuries for patients of all ages. There are other medical services available including lab testing, X-rays, EKGs, and IV fluids.  Locations are open daily from 8 a.m. - 8 p.m.     [facility]  [address], [place], [place] [address]  [phone]    [address], [place], [place] [address]  [phone]    Local Urgent Care     [facility] 8:30 am - 7:70 pm   [address] [place] [address]  [phone]    [facility]     8 am - 8 pm   [address], [place] [address]  [phone]    [facility] 7 am - 7:30 pm  [address] [place] [address]  [phone]     [facility] / [place] 7 am - 7:30 pm  [address] [place] [address]  [phone]

## 2024-08-13 DIAGNOSIS — R73.9 HYPERGLYCEMIA: Primary | ICD-10-CM

## 2024-08-13 DIAGNOSIS — R73.9 HYPERGLYCEMIA: ICD-10-CM

## 2024-08-13 LAB
EST. AVERAGE GLUCOSE BLD GHB EST-MCNC: 99.7 MG/DL
HBA1C MFR BLD: 5.1 %

## 2024-08-16 NOTE — PROGRESS NOTES
Bakari Moss Jr.    Age 64 y.o.    male    1960    [facility] [id number]    8/26/2024  Arrival Time_____________  OR Time____________135 Min     Procedure(s):  BILATERAL MAXILLARY ANTROSTOMY WITH TISSUE REMOVAL, TOTAL ETHMOID AND SPHENOID WITH TISSUE REMOVAL, IMAGE NAVIGATION, NASAL POLYPECTOMY                      General    Surgeon(s):  Chhaya Bettencourt, DO       Phone [phone] ([place])     [facility]  Date  Info Source  Home  Cell         Work  _____________________________________________________________________  _____________________________________________________________________  _____________________________________________________________________  _____________________________________________________________________  _____________________________________________________________________    PCP _____________________________ Phone_________________     H&P  ________________  Bringing      Chart              Epic      DOS      Called________  EKG ________________   Bringing      Chart              Epic      DOS      Called________  LABS________________   Bringing     Chart              Epic      DOS      Called________  Cardiac Clearance ______ Bringing      Chart              Epic      DOS      Called________  Pulmonary Clearance____ Bringing      Chart              Epic      DOS      Called________    Cardiologist________________________ Phone___________________________  Pulmonologist_______________________Phone___________________________    ? Advance Directives   ? Shinto concerns / Waiver on Chart            PAT Communications________________  ? Pre-op Instructions Given /Understood          _________________________________  ? Directions to Surgery Center                          _________________________________  ? Transportation Home_______________      __________________________________  ? Crutches/Walker__________________        __________________________________    Orders: Hard copy/ EPIC                  Transcribed/ EPIC              _______Wt.    ________Pharmacy                         _______SCD                      ______TED HOSE    COVID + _______DATE    ___OK per Anesthesia   ____OK per Surgeon

## 2024-08-20 NOTE — PROGRESS NOTES
Date and time of surgery : 8/26/24 at 1235             Arrival Time: 1035      Bring Picture ID and insurance card.  Please wear simple, loose fitting clothing to the hospital.   Do not bring valuables (money, credit cards, checkbooks, etc.)   DO NOT wear any jewelry or piercings on day of surgery.  All body piercing jewelry must be removed.  If you have dentures, they will be removed before going to the OR; we will provide you a container.  If you wear contact lenses or glasses, they will be removed; please bring a case for them.  Shower the evening before or morning of surgery with antibacterial soap.  Nothing to eat or drink after midnight the day before surgery.   You may brush your teeth and gargle the morning of surgery.  DO NOT SWALLOW WATER.   The morning of your surgery take only Metoprolol and Pantoprazole with a sip of water.  Aspirin, Ibuprofen, Advil, Naproxen, Vitamin E and other Anti-inflammatory products and supplements should be stopped for 5 -7days before surgery or as directed by your physician.  Hold Mobic and Plavix 5 days prior to surgery and hold Eliquis 2 days prior to surgery.  Do not smoke or drink any alcoholic beverages 24 hours prior to surgery.  This includes NA Beer. Refrain from the usage of any recreational drugs, including non-prescribed prescription drugs.   You MUST plan for a responsible adult to stay on site while you are here and take you home after your surgery. You will not be allowed to leave alone or drive yourself home. It is strongly suggested someone stay with you the first 24 hrs. Your surgery will be cancelled if you do not have a ride home.  To help prevent infection, change your sheets the night before surgery.   If you  have a Living Will and Durable Power of  for Healthcare, please bring in a copy.  Notify your Surgeon if you develop any illness between now and time of surgery. Cough, cold, fever, sore throat, nausea, vomiting, etc.  Please notify your

## 2024-08-22 ENCOUNTER — TELEPHONE (OUTPATIENT)
Dept: ENT CLINIC | Age: 64
End: 2024-08-22

## 2024-08-23 ENCOUNTER — ANESTHESIA EVENT (OUTPATIENT)
Dept: OPERATING ROOM | Age: 64
End: 2024-08-23
Payer: COMMERCIAL

## 2024-08-26 ENCOUNTER — HOSPITAL ENCOUNTER (OUTPATIENT)
Age: 64
Setting detail: OUTPATIENT SURGERY
Discharge: HOME | End: 2024-08-26
Attending: OTOLARYNGOLOGY | Admitting: OTOLARYNGOLOGY
Payer: COMMERCIAL

## 2024-08-26 ENCOUNTER — ANESTHESIA (OUTPATIENT)
Dept: OPERATING ROOM | Age: 64
End: 2024-08-26
Payer: COMMERCIAL

## 2024-08-26 VITALS
HEART RATE: 75 BPM | RESPIRATION RATE: 17 BRPM | BODY MASS INDEX: 29.92 KG/M2 | DIASTOLIC BLOOD PRESSURE: 98 MMHG | TEMPERATURE: 97.5 F | OXYGEN SATURATION: 94 % | WEIGHT: 209 LBS | HEIGHT: 70 IN | SYSTOLIC BLOOD PRESSURE: 138 MMHG

## 2024-08-26 DIAGNOSIS — J32.0 CHRONIC MAXILLARY SINUSITIS: ICD-10-CM

## 2024-08-26 DIAGNOSIS — G89.18 POST-OP PAIN: Primary | ICD-10-CM

## 2024-08-26 DIAGNOSIS — J33.9 MULTIPLE NASAL POLYPS: ICD-10-CM

## 2024-08-26 PROBLEM — J32.4 CHRONIC PANSINUSITIS: Status: ACTIVE | Noted: 2024-08-26

## 2024-08-26 PROCEDURE — 3600000004 HC SURGERY LEVEL 4 BASE: Performed by: OTOLARYNGOLOGY

## 2024-08-26 PROCEDURE — 2709999900 HC NON-CHARGEABLE SUPPLY: Performed by: OTOLARYNGOLOGY

## 2024-08-26 PROCEDURE — 7100000000 HC PACU RECOVERY - FIRST 15 MIN: Performed by: OTOLARYNGOLOGY

## 2024-08-26 PROCEDURE — 2580000003 HC RX 258: Performed by: OTOLARYNGOLOGY

## 2024-08-26 PROCEDURE — 7100000011 HC PHASE II RECOVERY - ADDTL 15 MIN: Performed by: OTOLARYNGOLOGY

## 2024-08-26 PROCEDURE — 2500000003 HC RX 250 WO HCPCS: Performed by: NURSE ANESTHETIST, CERTIFIED REGISTERED

## 2024-08-26 PROCEDURE — A4217 STERILE WATER/SALINE, 500 ML: HCPCS | Performed by: OTOLARYNGOLOGY

## 2024-08-26 PROCEDURE — 7100000001 HC PACU RECOVERY - ADDTL 15 MIN: Performed by: OTOLARYNGOLOGY

## 2024-08-26 PROCEDURE — 3600000014 HC SURGERY LEVEL 4 ADDTL 15MIN: Performed by: OTOLARYNGOLOGY

## 2024-08-26 PROCEDURE — 2580000003 HC RX 258: Performed by: ANESTHESIOLOGY

## 2024-08-26 PROCEDURE — 6360000002 HC RX W HCPCS: Performed by: NURSE ANESTHETIST, CERTIFIED REGISTERED

## 2024-08-26 PROCEDURE — 88305 TISSUE EXAM BY PATHOLOGIST: CPT

## 2024-08-26 PROCEDURE — 2720000010 HC SURG SUPPLY STERILE: Performed by: OTOLARYNGOLOGY

## 2024-08-26 PROCEDURE — 31267 ENDOSCOPY MAXILLARY SINUS: CPT | Performed by: OTOLARYNGOLOGY

## 2024-08-26 PROCEDURE — 88312 SPECIAL STAINS GROUP 1: CPT

## 2024-08-26 PROCEDURE — 6370000000 HC RX 637 (ALT 250 FOR IP): Performed by: OTOLARYNGOLOGY

## 2024-08-26 PROCEDURE — 31257 NSL/SINS NDSC TOT W/SPHENDT: CPT | Performed by: OTOLARYNGOLOGY

## 2024-08-26 PROCEDURE — 3700000001 HC ADD 15 MINUTES (ANESTHESIA): Performed by: OTOLARYNGOLOGY

## 2024-08-26 PROCEDURE — 2500000003 HC RX 250 WO HCPCS: Performed by: OTOLARYNGOLOGY

## 2024-08-26 PROCEDURE — 3700000000 HC ANESTHESIA ATTENDED CARE: Performed by: OTOLARYNGOLOGY

## 2024-08-26 PROCEDURE — 7100000010 HC PHASE II RECOVERY - FIRST 15 MIN: Performed by: OTOLARYNGOLOGY

## 2024-08-26 PROCEDURE — 61782 SCAN PROC CRANIAL EXTRA: CPT | Performed by: OTOLARYNGOLOGY

## 2024-08-26 PROCEDURE — 31237 NSL/SINS NDSC SURG BX POLYPC: CPT | Performed by: OTOLARYNGOLOGY

## 2024-08-26 RX ORDER — MEPERIDINE HYDROCHLORIDE 50 MG/ML
12.5 INJECTION INTRAMUSCULAR; INTRAVENOUS; SUBCUTANEOUS EVERY 5 MIN PRN
Status: DISCONTINUED | OUTPATIENT
Start: 2024-08-26 | End: 2024-08-26 | Stop reason: HOSPADM

## 2024-08-26 RX ORDER — SODIUM CHLORIDE 0.9 % (FLUSH) 0.9 %
5-40 SYRINGE (ML) INJECTION EVERY 12 HOURS SCHEDULED
Status: DISCONTINUED | OUTPATIENT
Start: 2024-08-26 | End: 2024-08-26 | Stop reason: HOSPADM

## 2024-08-26 RX ORDER — ONDANSETRON 2 MG/ML
INJECTION INTRAMUSCULAR; INTRAVENOUS PRN
Status: DISCONTINUED | OUTPATIENT
Start: 2024-08-26 | End: 2024-08-26 | Stop reason: SDUPTHER

## 2024-08-26 RX ORDER — SODIUM CHLORIDE 9 MG/ML
INJECTION, SOLUTION INTRAVENOUS PRN
Status: DISCONTINUED | OUTPATIENT
Start: 2024-08-26 | End: 2024-08-26 | Stop reason: HOSPADM

## 2024-08-26 RX ORDER — OXYCODONE HYDROCHLORIDE 5 MG/1
10 TABLET ORAL PRN
Status: DISCONTINUED | OUTPATIENT
Start: 2024-08-26 | End: 2024-08-26 | Stop reason: HOSPADM

## 2024-08-26 RX ORDER — SODIUM CHLORIDE, SODIUM LACTATE, POTASSIUM CHLORIDE, CALCIUM CHLORIDE 600; 310; 30; 20 MG/100ML; MG/100ML; MG/100ML; MG/100ML
INJECTION, SOLUTION INTRAVENOUS CONTINUOUS
Status: DISCONTINUED | OUTPATIENT
Start: 2024-08-26 | End: 2024-08-26 | Stop reason: HOSPADM

## 2024-08-26 RX ORDER — PROPOFOL 10 MG/ML
INJECTION, EMULSION INTRAVENOUS PRN
Status: DISCONTINUED | OUTPATIENT
Start: 2024-08-26 | End: 2024-08-26 | Stop reason: SDUPTHER

## 2024-08-26 RX ORDER — OXYMETAZOLINE HYDROCHLORIDE 0.05 G/100ML
SPRAY NASAL PRN
Status: DISCONTINUED | OUTPATIENT
Start: 2024-08-26 | End: 2024-08-26 | Stop reason: ALTCHOICE

## 2024-08-26 RX ORDER — OXYCODONE HYDROCHLORIDE 5 MG/1
5 TABLET ORAL PRN
Status: DISCONTINUED | OUTPATIENT
Start: 2024-08-26 | End: 2024-08-26 | Stop reason: HOSPADM

## 2024-08-26 RX ORDER — FENTANYL CITRATE 50 UG/ML
INJECTION, SOLUTION INTRAMUSCULAR; INTRAVENOUS PRN
Status: DISCONTINUED | OUTPATIENT
Start: 2024-08-26 | End: 2024-08-26 | Stop reason: SDUPTHER

## 2024-08-26 RX ORDER — LIDOCAINE HYDROCHLORIDE 10 MG/ML
0.3 INJECTION, SOLUTION EPIDURAL; INFILTRATION; INTRACAUDAL; PERINEURAL
Status: DISCONTINUED | OUTPATIENT
Start: 2024-08-26 | End: 2024-08-26 | Stop reason: HOSPADM

## 2024-08-26 RX ORDER — LIDOCAINE HYDROCHLORIDE AND EPINEPHRINE 10; 10 MG/ML; UG/ML
INJECTION, SOLUTION INFILTRATION; PERINEURAL PRN
Status: DISCONTINUED | OUTPATIENT
Start: 2024-08-26 | End: 2024-08-26 | Stop reason: ALTCHOICE

## 2024-08-26 RX ORDER — ROCURONIUM BROMIDE 10 MG/ML
INJECTION, SOLUTION INTRAVENOUS PRN
Status: DISCONTINUED | OUTPATIENT
Start: 2024-08-26 | End: 2024-08-26 | Stop reason: SDUPTHER

## 2024-08-26 RX ORDER — NALOXONE HYDROCHLORIDE 0.4 MG/ML
INJECTION, SOLUTION INTRAMUSCULAR; INTRAVENOUS; SUBCUTANEOUS PRN
Status: DISCONTINUED | OUTPATIENT
Start: 2024-08-26 | End: 2024-08-26 | Stop reason: HOSPADM

## 2024-08-26 RX ORDER — SODIUM CHLORIDE 0.9 % (FLUSH) 0.9 %
5-40 SYRINGE (ML) INJECTION PRN
Status: DISCONTINUED | OUTPATIENT
Start: 2024-08-26 | End: 2024-08-26 | Stop reason: HOSPADM

## 2024-08-26 RX ORDER — HYDROCODONE BITARTRATE AND ACETAMINOPHEN 5; 325 MG/1; MG/1
1 TABLET ORAL EVERY 6 HOURS PRN
Qty: 10 TABLET | Refills: 0 | Status: SHIPPED | OUTPATIENT
Start: 2024-08-26 | End: 2024-08-29

## 2024-08-26 RX ORDER — LABETALOL HYDROCHLORIDE 5 MG/ML
5 INJECTION, SOLUTION INTRAVENOUS EVERY 10 MIN PRN
Status: DISCONTINUED | OUTPATIENT
Start: 2024-08-26 | End: 2024-08-26 | Stop reason: HOSPADM

## 2024-08-26 RX ORDER — EPHEDRINE SULFATE 50 MG/ML
INJECTION INTRAVENOUS PRN
Status: DISCONTINUED | OUTPATIENT
Start: 2024-08-26 | End: 2024-08-26 | Stop reason: SDUPTHER

## 2024-08-26 RX ORDER — DEXAMETHASONE SODIUM PHOSPHATE 10 MG/ML
INJECTION INTRAMUSCULAR; INTRAVENOUS PRN
Status: DISCONTINUED | OUTPATIENT
Start: 2024-08-26 | End: 2024-08-26 | Stop reason: SDUPTHER

## 2024-08-26 RX ORDER — ONDANSETRON 2 MG/ML
4 INJECTION INTRAMUSCULAR; INTRAVENOUS
Status: DISCONTINUED | OUTPATIENT
Start: 2024-08-26 | End: 2024-08-26 | Stop reason: HOSPADM

## 2024-08-26 RX ORDER — DIPHENHYDRAMINE HYDROCHLORIDE 50 MG/ML
12.5 INJECTION INTRAMUSCULAR; INTRAVENOUS
Status: DISCONTINUED | OUTPATIENT
Start: 2024-08-26 | End: 2024-08-26 | Stop reason: HOSPADM

## 2024-08-26 RX ORDER — MAGNESIUM HYDROXIDE 1200 MG/15ML
LIQUID ORAL CONTINUOUS PRN
Status: COMPLETED | OUTPATIENT
Start: 2024-08-26 | End: 2024-08-26

## 2024-08-26 RX ORDER — LIDOCAINE HYDROCHLORIDE 20 MG/ML
INJECTION, SOLUTION EPIDURAL; INFILTRATION; INTRACAUDAL; PERINEURAL PRN
Status: DISCONTINUED | OUTPATIENT
Start: 2024-08-26 | End: 2024-08-26 | Stop reason: SDUPTHER

## 2024-08-26 RX ADMIN — ONDANSETRON 4 MG: 2 INJECTION INTRAMUSCULAR; INTRAVENOUS at 12:30

## 2024-08-26 RX ADMIN — EPHEDRINE SULFATE 15 MG: 50 INJECTION INTRAVENOUS at 12:24

## 2024-08-26 RX ADMIN — ROCURONIUM BROMIDE 50 MG: 50 INJECTION, SOLUTION INTRAVENOUS at 12:11

## 2024-08-26 RX ADMIN — SUGAMMADEX 200 MG: 100 INJECTION, SOLUTION INTRAVENOUS at 13:22

## 2024-08-26 RX ADMIN — LIDOCAINE HYDROCHLORIDE 60 MG: 20 INJECTION, SOLUTION EPIDURAL; INFILTRATION; INTRACAUDAL; PERINEURAL at 12:11

## 2024-08-26 RX ADMIN — SODIUM CHLORIDE, POTASSIUM CHLORIDE, SODIUM LACTATE AND CALCIUM CHLORIDE: 600; 310; 30; 20 INJECTION, SOLUTION INTRAVENOUS at 11:08

## 2024-08-26 RX ADMIN — EPHEDRINE SULFATE 10 MG: 50 INJECTION INTRAVENOUS at 12:30

## 2024-08-26 RX ADMIN — PROPOFOL 170 MG: 10 INJECTION, EMULSION INTRAVENOUS at 12:11

## 2024-08-26 RX ADMIN — FENTANYL CITRATE 100 MCG: 50 INJECTION, SOLUTION INTRAMUSCULAR; INTRAVENOUS at 12:11

## 2024-08-26 RX ADMIN — DEXAMETHASONE SODIUM PHOSPHATE 10 MG: 10 INJECTION INTRAMUSCULAR; INTRAVENOUS at 12:30

## 2024-08-26 RX ADMIN — SODIUM CHLORIDE, POTASSIUM CHLORIDE, SODIUM LACTATE AND CALCIUM CHLORIDE: 600; 310; 30; 20 INJECTION, SOLUTION INTRAVENOUS at 13:37

## 2024-08-26 RX ADMIN — EPHEDRINE SULFATE 10 MG: 50 INJECTION INTRAVENOUS at 12:38

## 2024-08-26 ASSESSMENT — PAIN - FUNCTIONAL ASSESSMENT
PAIN_FUNCTIONAL_ASSESSMENT: 0-10
PAIN_FUNCTIONAL_ASSESSMENT: NONE - DENIES PAIN

## 2024-08-26 NOTE — DISCHARGE INSTRUCTIONS
Sinus Surgery Discharge Instructions   General Anesthesia or Sedation   · Do not drive or operate heavy machinery for 24 hours.   · Do not consume alcohol, tranquilizers, sleeping medications, or any non-prescribed medications for 24 hours.   · Do not make important decisions or sign any important papers in the next 24 hours.   · You should have someone with you tonight at home.   · You may appear flushed for several hours after surgery   Activity   You are advised to go directly home from the hospital. Restrict your activities and rest for a day. Resume light to normal activity tomorrow.   Specific activity instructions:   · NO BENDING   · NO LIFTING   · NO STRAINING   · NO NOSE BLOWING   · COUGH AND SNEEZE WITH YOUR MOUTH OPEN   · Sleep with your head elevated with a couple pillows.   Fluids and Diet   · Begin with clear liquids, bouillon, dry toast, soda crackers. If not nauseated, you may go to a regular diet when you desire. Greasy and spicy foods are not advised after anesthesia   Medications   · Prescription sent with you. Use as directed.   · When taking pain medications, you may experience dizziness or drowsiness.   · Do not drink alcohol or drive when you are taking these medications.   · You may take a non-prescription \"headache remedy\" type medications that you normally use, HOWEVER, NO MOTRIN, ASPIRIN, IBUPROFEN, ADVIL. TYLENOL CONTAINING PRODUCTS ONLY.   · You may resume your daily prescription medication schedule EXCLUDING BLOOD THINNERS.   Operative Site   · Headache, plugged nose and facial pain is normal after surgery   · Oozing is normal, you may change mustache dressings (gauze pad) under your nose as need for oozing, call if heavy bleeding that persists   · POST OP DAY 1-ONWARD: USE OVER THE COUNTER SALINE NASAL SPRAY (MIST) 4-6 TIMES A DAY, YOU CANNOT OVERUSE IT.     Follow up care   Call your surgeon if you have any problem that concerns you. After hours, you can reach your physician through

## 2024-08-26 NOTE — INTERVAL H&P NOTE
Update History & Physical    The patient's History and Physical of August 12, 2024 was reviewed with the patient and I examined the patient. There was no change. The surgical site was confirmed by the patient and me.     Plan: The risks, benefits, expected outcome, and alternative to the recommended procedure have been discussed with the patient. Patient understands and wants to proceed with the procedure.     Electronically signed by Chhaya Bettencourt DO on 8/26/2024 at 12:08 PM

## 2024-08-26 NOTE — ANESTHESIA PRE PROCEDURE
Department of Anesthesiology  Preprocedure Note       Name:  Bakari Moss Jr.   Age:  64 y.o.  :  1960                                          MRN:  [id number]         Date:  2024      Surgeon: Surgeon(s):  Chhaya Bettencourt DO    Procedure: Procedure(s):  BILATERAL MAXILLARY ANTROSTOMY WITH TISSUE REMOVAL, TOTAL ETHMOID AND SPHENOID WITH TISSUE REMOVAL, IMAGE NAVIGATION, NASAL POLYPECTOMY    Medications prior to admission:   Prior to Admission medications    Medication Sig Start Date End Date Taking? Authorizing Provider   pantoprazole (PROTONIX) 40 MG tablet TAKE 1 TABLET BY MOUTH DAILY 24   Moustapha Greer MD   loratadine (CLARITIN) 10 MG capsule Take 1 capsule by mouth as needed    Niru Ansari MD   meloxicam (MOBIC) 15 MG tablet TAKE 1 TABLET BY MOUTH DAILY 24   Moustapha Greer MD   rosuvastatin (CRESTOR) 40 MG tablet Take 1 tablet by mouth daily  Patient taking differently: Take 1 tablet by mouth nightly 23   Moustapha Greer MD   losartan (COZAAR) 25 MG tablet Take 1 tablet by mouth daily    Niru Ansari MD   metoprolol tartrate (LOPRESSOR) 25 MG tablet TAKE 1/2 TABLET BY MOUTH TWICE DAILY 3/28/22   Moustapha Greer MD   apixaban (ELIQUIS) 5 MG TABS tablet Take 1 tablet by mouth 2 times daily    Niru Ansari MD   clopidogrel (PLAVIX) 75 MG tablet Take 1 tablet by mouth daily    ProviderNiru MD       Current medications:    Current Facility-Administered Medications   Medication Dose Route Frequency Provider Last Rate Last Admin    lidocaine PF 1 % injection 0.3 mL  0.3 mL IntraDERmal Once PRN Kimberley Nath MD        lactated ringers IV soln infusion   IntraVENous Continuous Kimberley Nath MD        sodium chloride flush 0.9 % injection 5-40 mL  5-40 mL IntraVENous 2 times per day Kimberley Nath MD        sodium chloride flush 0.9 % injection 5-40 mL  5-40 mL IntraVENous PRN Kimberley Nath MD

## 2024-08-26 NOTE — PROGRESS NOTES
Wife at bedside updated with no questions.Discharge instructions reviewed with patient and responsible adult. Discharge instructions given with no additional questions. Patient to be discharged home with belongings. Script sent to pharmacy per Md.

## 2024-08-26 NOTE — OP NOTE
[facility]  DIVISION OF OTOLARYNGOLOGY- HEAD & NECK SURGERY  OPERATIVE REPORT    Patient Name: Bakari Moss Jr.  YOB: 1960  Medical Record Number:  [id number]  Billing Number:  [id number]  Date of Procedure: 08/26/24  Time: 1300    Pre Operative Diagnoses:   Problem List Items Addressed This Visit    None    Post Operative Diagnoses:    Problem List Items Addressed This Visit    None             Procedure:  Bilateral Maxillary antrostomies with tissue removal, bilateral sphenoethmoidectomies, bilateral nasal polyp removal with image navigation       Surgeon: Chhaya Bettencourt DO    OR Staff/ Assistant:  Circulator: Arnulfo Bella RN  Surgical Assistant: Chhaya Deluca; Glory Bettencourt  Scrub Person First: Suzanne Nunez    Anesthesia:  General anesthesia.    Estimated Blood Loss: 20mL     Findings:  1) bilateral nasal polyps    DETAILS OF PROCEDURE(S):   Patient was brought to the operating room and placed supine on the operating room table.  After general anesthesia was obtained, Afrin soaked pledgets were placed into both nasal cavities.  The Fusion headset was placed and registered for use.  Prior to the procedure, the pre-op planning station was used to plan our approach to the frontal and sphenoid sinuses.  Instruments were then calibrated and registered for use.       Nasal Polypectomy (60925-88)  With a zero degree scope, the middle meatus was cleared of polyps bilaterally using the microdebrider so the middle meatus could be accessed for the maxillary antrostomy.      Left Maxillary Antrostomy with Mucus Membrane removal: (56968)  A 30 degree endoscope was used to examine the nasal cavity. The natural os was identified and probed with a maxillary sinus seeker and enlarged. Next, a backbiter was used to remove the uncinate process and the maxillary ostium was enlarged using thru cuts and microdebriders. Polypoid tissue filled the maxillary sinus which was removed with micrdebrider and cold  instruments. Then navigation was used to ensure the maxillary os was enlarged maximally.     Left Endoscopic Complete Ethmoidectomy and Sphenoidectomy (42257):   Using a 0 degree endoscope, the ethmoidal bulla was identified and removed and the basal lamella was incised to remove the posterior ethmoid air cells. The medial orbital wall and anterior skull base on the left was skeletonized using a 30 degree scope. Polyps and superior ethmoid cells were removed off of the skull base with a combination of an angled debrider and through biting forceps. The superior turbinate was identified on the right side and a through cutting forcep was used to resect the lower half of the superior turbinate. The natural ostium was then identified and enlarged with the J currette after confirmation with the navigation instruments. The anterior face of the sphenoid sinus was removed enlarging the sphenoid ostium.  Polyps were removed with a microdebrider.       Left Skull Base Dissection:   The medial orbital wall and anterior skull base skeletonized using a 30 degree scope using through biting forceps and kerrison rongeurs. With the skull base visible and free of disease the anterior ethmoid artery was identified and confirmed with the navigation instruments. Care was taken to ensure the skull base was not violated while periodically checking with the [employer] navigation.       Right Maxillary Antrostomy with Mucus Membrane removal (97900):   A 30 degree endoscope was used to examine the nasal cavity. The natural os was identified and probed with a maxillary sinus seeker and enlarged. Next, a backbiter was used to remove the uncinate process and the maxillary ostium was enlarged using thru cuts and microdebriders. Polypoid edematous tissue was filling the maxillary sinus and removed. Then navigation was used to ensure the maxillary os was enlarged maximally.     Right Endoscopic Complete Ethmoidectomy and sphenoidectomy

## 2024-08-26 NOTE — ANESTHESIA POSTPROCEDURE EVALUATION
Department of Anesthesiology  Postprocedure Note    Patient: Bakari Moss Jr.  MRN: [id number]  YOB: 1960  Date of evaluation: 8/26/2024    Procedure Summary       Date: 08/26/24 Room / Location: [address]    Anesthesia Start: 1207 Anesthesia Stop: 1342    Procedure: BILATERAL MAXILLARY ANTROSTOMY WITH TISSUE REMOVAL, TOTAL ETHMOID AND SPHENOID WITH TISSUE REMOVAL, IMAGE NAVIGATION, NASAL POLYPECTOMY (Bilateral: Nose) Diagnosis:       Chronic maxillary sinusitis      Multiple nasal polyps      (Chronic maxillary sinusitis [J32.0])      (Multiple nasal polyps [J33.9])    Surgeons: Chhaya Bettencourt DO Responsible Provider: Luis Manuel Gamez MD    Anesthesia Type: general ASA Status: 3            Anesthesia Type: No value filed.    Luiza Phase I: Luiza Score: 10    Luiza Phase II: Luiza Score: 10    Anesthesia Post Evaluation    Comments: Postoperative Anesthesia Note    Name:    Bakari Moss Jr.  MRN:      [id number]    Patient Vitals in the past 12 hrs:  08/26/24 1422, Pulse:75, Resp:17, SpO2:94 %  08/26/24 1419, BP:(!) 138/98, Temp:97.5 °F (36.4 °C), Pulse:74, Resp:18, SpO2:92 %  08/26/24 1415, Pulse:75, Resp:19, SpO2:94 %  08/26/24 1400, BP:(!) 143/97, Pulse:77, Resp:15, SpO2:92 %  08/26/24 1355, BP:(!) 146/89, Pulse:78, Resp:13, SpO2:95 %  08/26/24 1351, Pulse:77, Resp:16, SpO2:93 %  08/26/24 1350, BP:(!) 146/98, Pulse:76, Resp:13, SpO2:96 %  08/26/24 1345, BP:(!) 138/99, Temp:97.4 °F (36.3 °C), Pulse:79, Resp:18, SpO2:95 %  08/26/24 1340, BP:(!) 134/97, Pulse:79, Resp:17, SpO2:93 %  08/26/24 1337, BP:(!) 146/97, Temp:97 °F (36.1 °C), Temp src:Temporal, Pulse:73, Resp:14, SpO2:93 %  08/26/24 1104, BP:(!) 136/101, Temp:97.9 °F (36.6 °C), Pulse:59, Resp:16, SpO2:96 %     LABS:    CBC  Lab Results       Component                Value               Date/Time                  WBC                      4.4                 08/12/2024 09:23 AM        HGB

## 2024-09-03 ENCOUNTER — OFFICE VISIT (OUTPATIENT)
Dept: ENT CLINIC | Age: 64
End: 2024-09-03
Payer: COMMERCIAL

## 2024-09-03 VITALS
HEART RATE: 63 BPM | DIASTOLIC BLOOD PRESSURE: 78 MMHG | BODY MASS INDEX: 29.92 KG/M2 | HEIGHT: 70 IN | WEIGHT: 209 LBS | SYSTOLIC BLOOD PRESSURE: 111 MMHG

## 2024-09-03 DIAGNOSIS — J32.4 CHRONIC PANSINUSITIS: Primary | ICD-10-CM

## 2024-09-03 DIAGNOSIS — J33.9 MULTIPLE NASAL POLYPS: ICD-10-CM

## 2024-09-03 PROCEDURE — 31237 NSL/SINS NDSC SURG BX POLYPC: CPT | Performed by: OTOLARYNGOLOGY

## 2024-09-03 NOTE — PROGRESS NOTES
Bakari is a 64-year-old male 1 week status post bilateral Fess for chronic sinusitis and nasal polyps.  Doing well rinsing    Nasal Endoscopy with Debridement  Pre Op Dx: Nasal congestion, post operative sinus surgery  Post Op Dx: Same  Procedure: Nasal endoscopy with debridement   Anesthesia: 2% lidocaine with afrin tiopical  EBL: None  Specimens: None  Findings: both sides nasal cavity and sinus crusting and mucus.     Procedure:    After the application of afrin and pontocaine the nasal sinus cavity was debrided utilizing a zero degree suzanne endoscope with Kerrison rongeurs and pantoja suctions on both sides.  The sinus lining was healing well. No evidence of bleeding or rhinorrhea was noted. Tolerated well.    Complications: None      Continue rinsing twice daily follow-up in 2 weeks

## 2024-09-19 ENCOUNTER — OFFICE VISIT (OUTPATIENT)
Dept: ENT CLINIC | Age: 64
End: 2024-09-19
Payer: COMMERCIAL

## 2024-09-19 VITALS
HEART RATE: 59 BPM | WEIGHT: 209 LBS | BODY MASS INDEX: 29.92 KG/M2 | DIASTOLIC BLOOD PRESSURE: 90 MMHG | SYSTOLIC BLOOD PRESSURE: 131 MMHG | HEIGHT: 70 IN

## 2024-09-19 DIAGNOSIS — J33.9 NASAL POLYP: ICD-10-CM

## 2024-09-19 DIAGNOSIS — J32.4 CHRONIC PANSINUSITIS: Primary | ICD-10-CM

## 2024-09-19 PROCEDURE — 99212 OFFICE O/P EST SF 10 MIN: CPT | Performed by: OTOLARYNGOLOGY

## 2024-09-19 PROCEDURE — 3075F SYST BP GE 130 - 139MM HG: CPT | Performed by: OTOLARYNGOLOGY

## 2024-09-19 PROCEDURE — 1036F TOBACCO NON-USER: CPT | Performed by: OTOLARYNGOLOGY

## 2024-09-19 PROCEDURE — G8417 CALC BMI ABV UP PARAM F/U: HCPCS | Performed by: OTOLARYNGOLOGY

## 2024-09-19 PROCEDURE — 31231 NASAL ENDOSCOPY DX: CPT | Performed by: OTOLARYNGOLOGY

## 2024-09-19 PROCEDURE — 1123F ACP DISCUSS/DSCN MKR DOCD: CPT | Performed by: OTOLARYNGOLOGY

## 2024-09-19 PROCEDURE — 3017F COLORECTAL CA SCREEN DOC REV: CPT | Performed by: OTOLARYNGOLOGY

## 2024-09-19 PROCEDURE — 3080F DIAST BP >= 90 MM HG: CPT | Performed by: OTOLARYNGOLOGY

## 2024-09-19 PROCEDURE — G8427 DOCREV CUR MEDS BY ELIG CLIN: HCPCS | Performed by: OTOLARYNGOLOGY

## 2024-09-19 RX ORDER — FLUTICASONE PROPIONATE 50 MCG
1 SPRAY, SUSPENSION (ML) NASAL 2 TIMES DAILY
Qty: 3 EACH | Refills: 3 | Status: SHIPPED | OUTPATIENT
Start: 2024-09-19

## 2024-09-19 ASSESSMENT — ENCOUNTER SYMPTOMS
FACIAL SWELLING: 0
APNEA: 0
TROUBLE SWALLOWING: 0
COUGH: 0
SINUS PRESSURE: 0
EYE ITCHING: 0
SHORTNESS OF BREATH: 0
VOICE CHANGE: 0
SORE THROAT: 0

## 2024-10-01 RX ORDER — MELOXICAM 15 MG/1
15 TABLET ORAL DAILY
Qty: 30 TABLET | Refills: 2 | Status: SHIPPED | OUTPATIENT
Start: 2024-10-01

## 2024-10-01 NOTE — TELEPHONE ENCOUNTER
Refill Request     CONFIRM preferrred pharmacy with the patient.    If Mail Order Rx - Pend for 90 day refill.      Last Seen: Last Seen Department: 8/12/2024  Last Seen by PCP: 4/2/2024    Last Written: 6.24.24    If no future appointment scheduled, route STAFF MESSAGE with patient name to the  Pool for scheduling.      Next Appointment:   Future Appointments   Date Time Provider Department Center   3/27/2025 11:00 AM Chhaya Bettencourt DO [facility]       Message sent to  to schedule appt with patient?  NO      Requested Prescriptions     Pending Prescriptions Disp Refills    meloxicam (MOBIC) 15 MG tablet [Pharmacy Med Name: MELOXICAM 15MG TABLETS] 30 tablet 2     Sig: TAKE 1 TABLET BY MOUTH DAILY

## 2024-11-04 RX ORDER — ROSUVASTATIN CALCIUM 40 MG/1
40 TABLET, COATED ORAL DAILY
Qty: 90 TABLET | Refills: 0 | Status: SHIPPED | OUTPATIENT
Start: 2024-11-04

## 2024-11-04 NOTE — TELEPHONE ENCOUNTER
Refill Request     CONFIRM preferrred pharmacy with the patient.    If Mail Order Rx - Pend for 90 day refill.      Last Seen: Last Seen Department: 8/12/2024  Last Seen by PCP: 4/2/2024    Last Written: 8/6/2024    If no future appointment scheduled, route STAFF MESSAGE with patient name to the  Pool for scheduling.      Next Appointment:   Future Appointments   Date Time Provider Department Center   3/27/2025 11:00 AM Chhaya Bettencourt DO [facility]       Message sent to  to schedule appt with patient?  NO      Requested Prescriptions     Pending Prescriptions Disp Refills    rosuvastatin (CRESTOR) 40 MG tablet [Pharmacy Med Name: ROSUVASTATIN 40MG TABLETS] 90 tablet 0     Sig: TAKE 1 TABLET BY MOUTH EVERY DAY

## 2024-12-04 RX ORDER — PANTOPRAZOLE SODIUM 40 MG/1
40 TABLET, DELAYED RELEASE ORAL DAILY
Qty: 90 TABLET | Refills: 0 | Status: SHIPPED | OUTPATIENT
Start: 2024-12-04

## 2025-01-03 RX ORDER — MELOXICAM 15 MG/1
15 TABLET ORAL DAILY
Qty: 30 TABLET | Refills: 0 | Status: SHIPPED | OUTPATIENT
Start: 2025-01-03

## 2025-01-03 NOTE — TELEPHONE ENCOUNTER
Refill Request     CONFIRM preferrred pharmacy with the patient.    If Mail Order Rx - Pend for 90 day refill.      Last Seen: Last Seen Department: 8/12/2024  Last Seen by PCP: 4/2/2024    Last Written: 12/23/2024    If no future appointment scheduled, route STAFF MESSAGE with patient name to the  Pool for scheduling.      Next Appointment:   Future Appointments   Date Time Provider Department Center   3/27/2025 11:00 AM Chhaya Bettencourt DO [facility]       Message sent to  to schedule appt with patient?  NO      Requested Prescriptions     Pending Prescriptions Disp Refills    meloxicam (MOBIC) 15 MG tablet [Pharmacy Med Name: MELOXICAM 15MG TABLETS] 30 tablet 2     Sig: TAKE 1 TABLET BY MOUTH DAILY

## 2025-02-04 RX ORDER — MELOXICAM 15 MG/1
15 TABLET ORAL DAILY
Qty: 30 TABLET | Refills: 0 | Status: SHIPPED | OUTPATIENT
Start: 2025-02-04

## 2025-02-05 RX ORDER — ROSUVASTATIN CALCIUM 40 MG/1
40 TABLET, COATED ORAL DAILY
Qty: 90 TABLET | Refills: 0 | Status: SHIPPED | OUTPATIENT
Start: 2025-02-05

## 2025-03-04 RX ORDER — PANTOPRAZOLE SODIUM 40 MG/1
40 TABLET, DELAYED RELEASE ORAL DAILY
Qty: 90 TABLET | Refills: 0 | Status: SHIPPED | OUTPATIENT
Start: 2025-03-04

## 2025-03-04 NOTE — TELEPHONE ENCOUNTER
Refill Request     CONFIRM preferrred pharmacy with the patient.    If Mail Order Rx - Pend for 90 day refill.      Last Seen: Last Seen Department: 8/12/2024  Last Seen by PCP: Visit date not found    Last Written: 12/4/24    If no future appointment scheduled, route STAFF MESSAGE with patient name to the  Pool for scheduling.      Next Appointment:   Future Appointments   Date Time Provider Department Center   3/27/2025 11:00 AM Chhaya Bettencourt DO [facility]       Message sent to  to schedule appt with patient?  N/A      Requested Prescriptions     Pending Prescriptions Disp Refills    pantoprazole (PROTONIX) 40 MG tablet 90 tablet 0     Sig: Take 1 tablet by mouth daily

## 2025-03-06 RX ORDER — MELOXICAM 15 MG/1
15 TABLET ORAL DAILY
Qty: 30 TABLET | Refills: 0 | Status: SHIPPED | OUTPATIENT
Start: 2025-03-06

## 2025-03-06 NOTE — TELEPHONE ENCOUNTER
Refill Request     CONFIRM preferrred pharmacy with the patient.    If Mail Order Rx - Pend for 90 day refill.      Last Seen: Last Seen Department: 8/12/2024  Last Seen by PCP: Visit date not found    Last Written: 2/4/25    If no future appointment scheduled, route STAFF MESSAGE with patient name to the  Pool for scheduling.      Next Appointment:   Future Appointments   Date Time Provider Department Center   3/27/2025 11:00 [facility]   5/27/2025  9:50 AM [facility]       Message sent to  to schedule appt with patient?  NO      Requested Prescriptions     Pending Prescriptions Disp Refills    meloxicam (MOBIC) 15 MG tablet [Pharmacy Med Name: MELOXICAM 15MG TABLETS] 30 tablet 0     Sig: TAKE 1 TABLET BY MOUTH DAILY

## 2025-03-27 ENCOUNTER — OFFICE VISIT (OUTPATIENT)
Dept: ENT CLINIC | Age: 65
End: 2025-03-27
Payer: MEDICARE

## 2025-03-27 VITALS
DIASTOLIC BLOOD PRESSURE: 110 MMHG | HEART RATE: 61 BPM | WEIGHT: 209 LBS | SYSTOLIC BLOOD PRESSURE: 151 MMHG | HEIGHT: 70 IN | BODY MASS INDEX: 29.92 KG/M2

## 2025-03-27 DIAGNOSIS — J33.9 NASAL POLYP: ICD-10-CM

## 2025-03-27 DIAGNOSIS — J32.4 CHRONIC PANSINUSITIS: Primary | ICD-10-CM

## 2025-03-27 DIAGNOSIS — R04.0 EPISTAXIS: ICD-10-CM

## 2025-03-27 PROCEDURE — 1160F RVW MEDS BY RX/DR IN RCRD: CPT | Performed by: OTOLARYNGOLOGY

## 2025-03-27 PROCEDURE — 99213 OFFICE O/P EST LOW 20 MIN: CPT | Performed by: OTOLARYNGOLOGY

## 2025-03-27 PROCEDURE — 1123F ACP DISCUSS/DSCN MKR DOCD: CPT | Performed by: OTOLARYNGOLOGY

## 2025-03-27 PROCEDURE — 1036F TOBACCO NON-USER: CPT | Performed by: OTOLARYNGOLOGY

## 2025-03-27 PROCEDURE — 31231 NASAL ENDOSCOPY DX: CPT | Performed by: OTOLARYNGOLOGY

## 2025-03-27 PROCEDURE — G8427 DOCREV CUR MEDS BY ELIG CLIN: HCPCS | Performed by: OTOLARYNGOLOGY

## 2025-03-27 PROCEDURE — 3017F COLORECTAL CA SCREEN DOC REV: CPT | Performed by: OTOLARYNGOLOGY

## 2025-03-27 PROCEDURE — 3080F DIAST BP >= 90 MM HG: CPT | Performed by: OTOLARYNGOLOGY

## 2025-03-27 PROCEDURE — 1159F MED LIST DOCD IN RCRD: CPT | Performed by: OTOLARYNGOLOGY

## 2025-03-27 PROCEDURE — 3077F SYST BP >= 140 MM HG: CPT | Performed by: OTOLARYNGOLOGY

## 2025-03-27 PROCEDURE — G8417 CALC BMI ABV UP PARAM F/U: HCPCS | Performed by: OTOLARYNGOLOGY

## 2025-03-27 ASSESSMENT — ENCOUNTER SYMPTOMS
EYE ITCHING: 0
APNEA: 0
VOICE CHANGE: 0
SINUS PRESSURE: 0
TROUBLE SWALLOWING: 0
FACIAL SWELLING: 0
SHORTNESS OF BREATH: 0
COUGH: 0
SORE THROAT: 0

## 2025-03-27 NOTE — PROGRESS NOTES
[facility]  [address], [address]  [place] [address]  P: [phone]       Patient     Bakari Moss Jr.  1960    ChiefComplaint     Chief Complaint   Patient presents with    Sinus Problem     6mo; doing good.         History of Present Illness     Bakari is a 65-year-old male here today for follow-up regarding chronic sinusitis and nasal polyps.  Underwent bilateral Fess August 2024.  Has been using Flonase, Claritin on a daily basis and rinsing his nose states he is doing well.  Had some epistaxis from the left side of his nose over the winter resolved 1 month ago.    Past Medical History     Past Medical History:   Diagnosis Date    CAD (coronary artery disease)     Hyperlipidemia     Hypertension     Other disorders of kidney and ureter     stones    Pericarditis     Thoracic ascending aortic aneurysm        Past Surgical History     Past Surgical History:   Procedure Laterality Date    AORTIC VALVE REPLACEMENT      CHOLECYSTECTOMY      CORONARY ANGIOPLASTY WITH STENT PLACEMENT  2009    ROTATOR CUFF REPAIR Left 2022    SINUS ENDOSCOPY Bilateral 8/26/2024    BILATERAL MAXILLARY ANTROSTOMY WITH TISSUE REMOVAL, TOTAL ETHMOID AND SPHENOID WITH TISSUE REMOVAL, IMAGE NAVIGATION, NASAL POLYPECTOMY performed by Chhaya Bettencourt DO at [facility] OR       Family History     Family History   Problem Relation Age of Onset    Diabetes Mother     High Blood Pressure Mother     Heart Attack Father        Social History     Social History     Tobacco Use    Smoking status: Never    Smokeless tobacco: Never   Vaping Use    Vaping status: Never Used   Substance Use Topics    Alcohol use: No     Alcohol/week: 0.0 standard drinks of alcohol    Drug use: No        Allergies     Allergies   Allergen Reactions    Miralax [Polyethylene Glycol] Other (See Comments)     Seizure after consuming whole bottle for colonoscopy per patient    Quinolones Other (See Comments)     Thoracic ascending aortic  Closure 2 Information: This tab is for additional flaps and grafts, including complex repair and grafts and complex repair and flaps. You can also specify a different location for the additional defect, if the location is the same you do not need to select a new one. We will insert the automated text for the repair you select below just as we do for solitary flaps and grafts. Please note that at this time if you select a location with a different insurance zone you will need to override the ICD10 and CPT if appropriate.

## 2025-04-07 RX ORDER — MELOXICAM 15 MG/1
15 TABLET ORAL DAILY
Qty: 30 TABLET | Refills: 0 | OUTPATIENT
Start: 2025-04-07

## 2025-05-01 RX ORDER — ROSUVASTATIN CALCIUM 40 MG/1
40 TABLET, COATED ORAL DAILY
Qty: 90 TABLET | Refills: 0 | Status: SHIPPED | OUTPATIENT
Start: 2025-05-01

## 2025-05-01 NOTE — TELEPHONE ENCOUNTER
Refill Request     CONFIRM preferrred pharmacy with the patient.    If Mail Order Rx - Pend for 90 day refill.      Last Seen: Last Seen Department: 8/12/2024  Last Seen by PCP: Visit date not found    Last Written: Refill Request     CONFIRM preferrred pharmacy with the patient.    If Mail Order Rx - Pend for 90 day refill.      Last Seen: Last Seen Department: 8/12/2024  Last Seen by PCP: Visit date not found    Last Written: 2/2025    If no future appointment scheduled, route STAFF MESSAGE with patient name to the  Pool for scheduling.      Next Appointment:   Future Appointments   Date Time Provider Department Center   3/26/2026 11:00 AM Chhaya Bettencourt DO [facility]       Message sent to  to schedule appt with patient?  NO      Requested Prescriptions     Pending Prescriptions Disp Refills    rosuvastatin (CRESTOR) 40 MG tablet [Pharmacy Med Name: ROSUVASTATIN 40MG TABLETS] 90 tablet 0     Sig: TAKE 1 TABLET BY MOUTH EVERY DAY        If no future appointment scheduled, route STAFF MESSAGE with patient name to the  Pool for scheduling.      Next Appointment:   Future Appointments   Date Time Provider Department Center   3/26/2026 11:00 AM Chhaya Bettencourt DO [facility]       Message sent to  to schedule appt with patient?  NO      Requested Prescriptions     Pending Prescriptions Disp Refills    rosuvastatin (CRESTOR) 40 MG tablet [Pharmacy Med Name: ROSUVASTATIN 40MG TABLETS] 90 tablet 0     Sig: TAKE 1 TABLET BY MOUTH EVERY DAY

## 2025-05-01 NOTE — TELEPHONE ENCOUNTER
This is a previous patient of Dr. Greer and it does not appear he is scheduled to establish with Dr. Lopez.  Patient will need to be called and have an appointment scheduled with new PCP

## 2025-05-06 RX ORDER — MELOXICAM 15 MG/1
15 TABLET ORAL DAILY
Qty: 30 TABLET | Refills: 0 | Status: SHIPPED | OUTPATIENT
Start: 2025-05-06

## 2025-05-06 NOTE — TELEPHONE ENCOUNTER
Refill Request     CONFIRM preferrred pharmacy with the patient.    If Mail Order Rx - Pend for 90 day refill.      Last Seen: Last Seen Department: 8/12/2024  Last Seen by PCP: Visit date not found    Last Written: 3/6/25    If no future appointment scheduled, route STAFF MESSAGE with patient name to the  Pool for scheduling.      Next Appointment:   Future Appointments   Date Time Provider Department Center   12/10/2025  8:30 AM [facility]   3/26/2026 11:00 AM [facility] Bettencourt DO [facility]       Message sent to  to schedule appt with patient?  NO      Requested Prescriptions     Pending Prescriptions Disp Refills    meloxicam (MOBIC) 15 MG tablet [Pharmacy Med Name: MELOXICAM 15MG TABLETS] 30 tablet 0     Sig: TAKE 1 TABLET BY MOUTH DAILY

## 2025-06-02 RX ORDER — MELOXICAM 15 MG/1
15 TABLET ORAL DAILY
Qty: 30 TABLET | Refills: 0 | Status: SHIPPED | OUTPATIENT
Start: 2025-06-02

## 2025-06-04 NOTE — TELEPHONE ENCOUNTER
Refill Request     CONFIRM preferrred pharmacy with the patient.    If Mail Order Rx - Pend for 90 day refill.      Last Seen: Last Seen Department: 8/12/2024  Last Seen by PCP: Visit date not found    Last Written: 3/4/25    If no future appointment scheduled, route STAFF MESSAGE with patient name to the  Pool for scheduling.      Next Appointment:   Future Appointments   Date Time Provider Department Center   12/10/2025  8:30 AM [facility]   3/26/2026 11:00 AM Ch[facility]aya Bettencourt DO [facility]       Message sent to  to schedule appt with patient?  NO      Requested Prescriptions     Pending Prescriptions Disp Refills    pantoprazole (PROTONIX) 40 MG tablet [Pharmacy Med Name: PANTOPRAZOLE 40MG TABLETS] 90 tablet 0     Sig: TAKE 1 TABLET BY MOUTH DAILY

## 2025-06-05 RX ORDER — PANTOPRAZOLE SODIUM 40 MG/1
40 TABLET, DELAYED RELEASE ORAL DAILY
Qty: 90 TABLET | Refills: 0 | Status: SHIPPED | OUTPATIENT
Start: 2025-06-05

## (undated) DEVICE — SUTURE ABSORBABLE MONOFILAMENT 4-0 SC-1 18 IN PLN GUT 1824H

## (undated) DEVICE — GLOVE,SURG,SENSICARE,ALOE,LF,PF,6: Brand: MEDLINE

## (undated) DEVICE — AGENT HEMOSTATIC SURGIFLOW MATRIX KIT W/THROMBIN

## (undated) DEVICE — SHEATH 1912000 5PK 4MM/0DEG STORZ XOMED: Brand: ENDO-SCRUB®

## (undated) DEVICE — TUBING 1895522 5PK STRAIGHTSHOT TO XPS: Brand: STRAIGHTSHOT®

## (undated) DEVICE — CODMAN® SURGICAL PATTIES 1/2" X 3" (1.27CM X 7.62CM): Brand: CODMAN®

## (undated) DEVICE — GLOVE ORANGE PI 7   MSG9070

## (undated) DEVICE — SOLUTION IV 500ML 0.9% SOD CHL PH 5 INJ USP VIAFLX PLAS

## (undated) DEVICE — NEEDLE HYPO 25GA L1.5IN BLU POLYPR HUB S STL REG BVL STR

## (undated) DEVICE — SYRINGE MED 10ML TRNSLUC BRL PLUNG BLK MRK POLYPR CTRL

## (undated) DEVICE — TUBING, SUCTION, 3/16" X 12', STRAIGHT: Brand: MEDLINE

## (undated) DEVICE — SHEATH 197230BVA 5PK ES2 STZ REV 4MM/30D: Brand: ENDO-SCRUB®

## (undated) DEVICE — GAUZE,SPONGE,2"X2",8PLY,STERILE,LF,2'S: Brand: MEDLINE

## (undated) DEVICE — SOLUTION IRRIG 500ML 0.9% SOD CHLO USP POUR PLAS BTL

## (undated) DEVICE — COAGULATOR SUCT 10FR L6IN HND FT SWCH VALLEYLAB

## (undated) DEVICE — SYRINGE MED 5ML CANN 17GA BLU PLAS BLNT TIP LUERLOCK CONN

## (undated) DEVICE — ELECTRODE PT RET AD L9FT HI MOIST COND ADH HYDRGEL CORDED

## (undated) DEVICE — INSTRUMENT TRACKER 9733533XOM ENT 1PK

## (undated) DEVICE — Device: Brand: NAKAO QUICKTRAP

## (undated) DEVICE — BLADE 1884080EM TRICUT 4MMX13CM M4 ROHS: Brand: FUSION®

## (undated) DEVICE — PATIENT TRACKER 9734887XOM NON-INVASIVE

## (undated) DEVICE — MINOR SET UP PACK: Brand: MEDLINE INDUSTRIES, INC.

## (undated) DEVICE — CONTAINER,SPECIMEN,OR STERILE,4OZ: Brand: MEDLINE

## (undated) DEVICE — HEAD AND NECK PACK: Brand: CONVERTORS

## (undated) DEVICE — TUBES STOMACH 48 IN X 16FR DBL LUMN SUMP SALEM ARGYLE ENFIT

## (undated) DEVICE — Z DISCONTINUED NO SUB SUTURE CHROMIC GUT SZ 4-0 L18IN ABSRB BRN L13MM P-3 3/8 CIR 1654G

## (undated) DEVICE — KIT,ANTI FOG,W/SPONGE & FLUID,SOFT PACK: Brand: MEDLINE

## (undated) DEVICE — SPLINT 1524055 DOYLE II AIRWAY SET: Brand: DOYLE II ™

## (undated) DEVICE — TUBE SUCT LAPSCP